# Patient Record
Sex: FEMALE | Race: WHITE | NOT HISPANIC OR LATINO
[De-identification: names, ages, dates, MRNs, and addresses within clinical notes are randomized per-mention and may not be internally consistent; named-entity substitution may affect disease eponyms.]

---

## 2018-05-30 ENCOUNTER — TRANSCRIPTION ENCOUNTER (OUTPATIENT)
Age: 73
End: 2018-05-30

## 2018-07-20 ENCOUNTER — TRANSCRIPTION ENCOUNTER (OUTPATIENT)
Age: 73
End: 2018-07-20

## 2019-03-03 ENCOUNTER — INPATIENT (INPATIENT)
Facility: HOSPITAL | Age: 74
LOS: 3 days | Discharge: HOME | DRG: 74 | End: 2019-03-07
Attending: HOSPITALIST | Admitting: STUDENT IN AN ORGANIZED HEALTH CARE EDUCATION/TRAINING PROGRAM
Payer: MEDICARE

## 2019-03-03 VITALS
SYSTOLIC BLOOD PRESSURE: 179 MMHG | OXYGEN SATURATION: 99 % | HEART RATE: 97 BPM | DIASTOLIC BLOOD PRESSURE: 90 MMHG | WEIGHT: 250 LBS | HEIGHT: 64 IN | TEMPERATURE: 98 F | RESPIRATION RATE: 20 BRPM

## 2019-03-03 DIAGNOSIS — Z29.9 ENCOUNTER FOR PROPHYLACTIC MEASURES, UNSPECIFIED: ICD-10-CM

## 2019-03-03 DIAGNOSIS — Z90.710 ACQUIRED ABSENCE OF BOTH CERVIX AND UTERUS: Chronic | ICD-10-CM

## 2019-03-03 DIAGNOSIS — E87.2 ACIDOSIS: ICD-10-CM

## 2019-03-03 DIAGNOSIS — M79.604 PAIN IN RIGHT LEG: ICD-10-CM

## 2019-03-03 DIAGNOSIS — E11.9 TYPE 2 DIABETES MELLITUS WITHOUT COMPLICATIONS: ICD-10-CM

## 2019-03-03 DIAGNOSIS — M25.559 PAIN IN UNSPECIFIED HIP: ICD-10-CM

## 2019-03-03 DIAGNOSIS — R41.3 OTHER AMNESIA: ICD-10-CM

## 2019-03-03 DIAGNOSIS — H40.9 UNSPECIFIED GLAUCOMA: ICD-10-CM

## 2019-03-03 LAB
ALBUMIN SERPL ELPH-MCNC: 4.1 G/DL — SIGNIFICANT CHANGE UP (ref 3.3–5)
ALP SERPL-CCNC: 76 U/L — SIGNIFICANT CHANGE UP (ref 40–120)
ALT FLD-CCNC: 19 U/L — SIGNIFICANT CHANGE UP (ref 10–45)
ANION GAP SERPL CALC-SCNC: 13 MMOL/L — SIGNIFICANT CHANGE UP (ref 5–17)
ANION GAP SERPL CALC-SCNC: 21 MMOL/L — HIGH (ref 5–17)
APTT BLD: 26.3 SEC — LOW (ref 27.5–36.3)
AST SERPL-CCNC: 24 U/L — SIGNIFICANT CHANGE UP (ref 10–40)
BASE EXCESS BLDV CALC-SCNC: -1.5 MMOL/L — SIGNIFICANT CHANGE UP (ref -2–2)
BASOPHILS # BLD AUTO: 0 K/UL — SIGNIFICANT CHANGE UP (ref 0–0.2)
BASOPHILS NFR BLD AUTO: 0.2 % — SIGNIFICANT CHANGE UP (ref 0–2)
BILIRUB SERPL-MCNC: 0.3 MG/DL — SIGNIFICANT CHANGE UP (ref 0.2–1.2)
BUN SERPL-MCNC: 26 MG/DL — HIGH (ref 7–23)
BUN SERPL-MCNC: 28 MG/DL — HIGH (ref 7–23)
CALCIUM SERPL-MCNC: 10.2 MG/DL — SIGNIFICANT CHANGE UP (ref 8.4–10.5)
CALCIUM SERPL-MCNC: 9.6 MG/DL — SIGNIFICANT CHANGE UP (ref 8.4–10.5)
CHLORIDE SERPL-SCNC: 106 MMOL/L — SIGNIFICANT CHANGE UP (ref 96–108)
CHLORIDE SERPL-SCNC: 106 MMOL/L — SIGNIFICANT CHANGE UP (ref 96–108)
CO2 BLDV-SCNC: 24 MMOL/L — SIGNIFICANT CHANGE UP (ref 22–30)
CO2 SERPL-SCNC: 16 MMOL/L — LOW (ref 22–31)
CO2 SERPL-SCNC: 21 MMOL/L — LOW (ref 22–31)
CREAT SERPL-MCNC: 0.82 MG/DL — SIGNIFICANT CHANGE UP (ref 0.5–1.3)
CREAT SERPL-MCNC: 0.88 MG/DL — SIGNIFICANT CHANGE UP (ref 0.5–1.3)
EOSINOPHIL # BLD AUTO: 0.2 K/UL — SIGNIFICANT CHANGE UP (ref 0–0.5)
EOSINOPHIL NFR BLD AUTO: 2 % — SIGNIFICANT CHANGE UP (ref 0–6)
ERYTHROCYTE [SEDIMENTATION RATE] IN BLOOD: 82 MM/HR — HIGH (ref 0–20)
GAS PNL BLDV: SIGNIFICANT CHANGE UP
GLUCOSE SERPL-MCNC: 137 MG/DL — HIGH (ref 70–99)
GLUCOSE SERPL-MCNC: 151 MG/DL — HIGH (ref 70–99)
HCO3 BLDV-SCNC: 23 MMOL/L — SIGNIFICANT CHANGE UP (ref 21–29)
HCT VFR BLD CALC: 39.9 % — SIGNIFICANT CHANGE UP (ref 34.5–45)
HGB BLD-MCNC: 13.7 G/DL — SIGNIFICANT CHANGE UP (ref 11.5–15.5)
INR BLD: 1.01 RATIO — SIGNIFICANT CHANGE UP (ref 0.88–1.16)
LACTATE SERPL-SCNC: 1.2 MMOL/L — SIGNIFICANT CHANGE UP (ref 0.7–2)
LYMPHOCYTES # BLD AUTO: 2.7 K/UL — SIGNIFICANT CHANGE UP (ref 1–3.3)
LYMPHOCYTES # BLD AUTO: 26.2 % — SIGNIFICANT CHANGE UP (ref 13–44)
MCHC RBC-ENTMCNC: 30.7 PG — SIGNIFICANT CHANGE UP (ref 27–34)
MCHC RBC-ENTMCNC: 34.3 GM/DL — SIGNIFICANT CHANGE UP (ref 32–36)
MCV RBC AUTO: 89.4 FL — SIGNIFICANT CHANGE UP (ref 80–100)
MONOCYTES # BLD AUTO: 0.8 K/UL — SIGNIFICANT CHANGE UP (ref 0–0.9)
MONOCYTES NFR BLD AUTO: 7.7 % — SIGNIFICANT CHANGE UP (ref 2–14)
NEUTROPHILS # BLD AUTO: 6.7 K/UL — SIGNIFICANT CHANGE UP (ref 1.8–7.4)
NEUTROPHILS NFR BLD AUTO: 63.9 % — SIGNIFICANT CHANGE UP (ref 43–77)
PCO2 BLDV: 41 MMHG — SIGNIFICANT CHANGE UP (ref 35–50)
PH BLDV: 7.37 — SIGNIFICANT CHANGE UP (ref 7.35–7.45)
PLATELET # BLD AUTO: 221 K/UL — SIGNIFICANT CHANGE UP (ref 150–400)
PO2 BLDV: 41 MMHG — SIGNIFICANT CHANGE UP (ref 25–45)
POTASSIUM SERPL-MCNC: 3.7 MMOL/L — SIGNIFICANT CHANGE UP (ref 3.5–5.3)
POTASSIUM SERPL-MCNC: 4.8 MMOL/L — SIGNIFICANT CHANGE UP (ref 3.5–5.3)
POTASSIUM SERPL-SCNC: 3.7 MMOL/L — SIGNIFICANT CHANGE UP (ref 3.5–5.3)
POTASSIUM SERPL-SCNC: 4.8 MMOL/L — SIGNIFICANT CHANGE UP (ref 3.5–5.3)
PROT SERPL-MCNC: 7.9 G/DL — SIGNIFICANT CHANGE UP (ref 6–8.3)
PROTHROM AB SERPL-ACNC: 11.6 SEC — SIGNIFICANT CHANGE UP (ref 10–12.9)
RBC # BLD: 4.46 M/UL — SIGNIFICANT CHANGE UP (ref 3.8–5.2)
RBC # FLD: 13.3 % — SIGNIFICANT CHANGE UP (ref 10.3–14.5)
SAO2 % BLDV: 70 % — SIGNIFICANT CHANGE UP (ref 67–88)
SODIUM SERPL-SCNC: 140 MMOL/L — SIGNIFICANT CHANGE UP (ref 135–145)
SODIUM SERPL-SCNC: 143 MMOL/L — SIGNIFICANT CHANGE UP (ref 135–145)
TSH SERPL-MCNC: 3.21 UIU/ML — SIGNIFICANT CHANGE UP (ref 0.27–4.2)
VIT B12 SERPL-MCNC: 191 PG/ML — LOW (ref 232–1245)
WBC # BLD: 10.4 K/UL — SIGNIFICANT CHANGE UP (ref 3.8–10.5)
WBC # FLD AUTO: 10.4 K/UL — SIGNIFICANT CHANGE UP (ref 3.8–10.5)

## 2019-03-03 PROCEDURE — 71045 X-RAY EXAM CHEST 1 VIEW: CPT | Mod: 26

## 2019-03-03 PROCEDURE — 99223 1ST HOSP IP/OBS HIGH 75: CPT

## 2019-03-03 PROCEDURE — 93010 ELECTROCARDIOGRAM REPORT: CPT

## 2019-03-03 PROCEDURE — 72170 X-RAY EXAM OF PELVIS: CPT | Mod: 26

## 2019-03-03 PROCEDURE — 99285 EMERGENCY DEPT VISIT HI MDM: CPT | Mod: GC,25

## 2019-03-03 RX ORDER — INSULIN LISPRO 100/ML
VIAL (ML) SUBCUTANEOUS
Qty: 0 | Refills: 0 | Status: DISCONTINUED | OUTPATIENT
Start: 2019-03-03 | End: 2019-03-07

## 2019-03-03 RX ORDER — DEXTROSE 50 % IN WATER 50 %
25 SYRINGE (ML) INTRAVENOUS ONCE
Qty: 0 | Refills: 0 | Status: DISCONTINUED | OUTPATIENT
Start: 2019-03-03 | End: 2019-03-07

## 2019-03-03 RX ORDER — SODIUM CHLORIDE 9 MG/ML
1000 INJECTION, SOLUTION INTRAVENOUS
Qty: 0 | Refills: 0 | Status: DISCONTINUED | OUTPATIENT
Start: 2019-03-03 | End: 2019-03-07

## 2019-03-03 RX ORDER — GABAPENTIN 400 MG/1
100 CAPSULE ORAL
Qty: 0 | Refills: 0 | Status: DISCONTINUED | OUTPATIENT
Start: 2019-03-03 | End: 2019-03-05

## 2019-03-03 RX ORDER — HEPARIN SODIUM 5000 [USP'U]/ML
5000 INJECTION INTRAVENOUS; SUBCUTANEOUS EVERY 8 HOURS
Qty: 0 | Refills: 0 | Status: DISCONTINUED | OUTPATIENT
Start: 2019-03-03 | End: 2019-03-07

## 2019-03-03 RX ORDER — INSULIN LISPRO 100/ML
VIAL (ML) SUBCUTANEOUS
Qty: 0 | Refills: 0 | Status: DISCONTINUED | OUTPATIENT
Start: 2019-03-03 | End: 2019-03-03

## 2019-03-03 RX ORDER — LIDOCAINE 4 G/100G
1 CREAM TOPICAL ONCE
Qty: 0 | Refills: 0 | Status: COMPLETED | OUTPATIENT
Start: 2019-03-03 | End: 2019-03-03

## 2019-03-03 RX ORDER — INSULIN LISPRO 100/ML
VIAL (ML) SUBCUTANEOUS AT BEDTIME
Qty: 0 | Refills: 0 | Status: DISCONTINUED | OUTPATIENT
Start: 2019-03-03 | End: 2019-03-07

## 2019-03-03 RX ORDER — ACETAMINOPHEN 500 MG
975 TABLET ORAL ONCE
Qty: 0 | Refills: 0 | Status: COMPLETED | OUTPATIENT
Start: 2019-03-03 | End: 2019-03-03

## 2019-03-03 RX ORDER — ASPIRIN/CALCIUM CARB/MAGNESIUM 324 MG
81 TABLET ORAL DAILY
Qty: 0 | Refills: 0 | Status: DISCONTINUED | OUTPATIENT
Start: 2019-03-03 | End: 2019-03-07

## 2019-03-03 RX ORDER — LATANOPROST 0.05 MG/ML
1 SOLUTION/ DROPS OPHTHALMIC; TOPICAL AT BEDTIME
Qty: 0 | Refills: 0 | Status: DISCONTINUED | OUTPATIENT
Start: 2019-03-03 | End: 2019-03-07

## 2019-03-03 RX ORDER — GLUCAGON INJECTION, SOLUTION 0.5 MG/.1ML
1 INJECTION, SOLUTION SUBCUTANEOUS ONCE
Qty: 0 | Refills: 0 | Status: DISCONTINUED | OUTPATIENT
Start: 2019-03-03 | End: 2019-03-07

## 2019-03-03 RX ORDER — DEXTROSE 50 % IN WATER 50 %
15 SYRINGE (ML) INTRAVENOUS ONCE
Qty: 0 | Refills: 0 | Status: DISCONTINUED | OUTPATIENT
Start: 2019-03-03 | End: 2019-03-07

## 2019-03-03 RX ORDER — ATORVASTATIN CALCIUM 80 MG/1
20 TABLET, FILM COATED ORAL AT BEDTIME
Qty: 0 | Refills: 0 | Status: DISCONTINUED | OUTPATIENT
Start: 2019-03-03 | End: 2019-03-07

## 2019-03-03 RX ORDER — BRINZOLAMIDE/BRIMONIDINE TARTRATE 10; 2 MG/ML; MG/ML
1 SUSPENSION/ DROPS OPHTHALMIC
Qty: 0 | Refills: 0 | COMMUNITY

## 2019-03-03 RX ORDER — CYCLOBENZAPRINE HYDROCHLORIDE 10 MG/1
10 TABLET, FILM COATED ORAL ONCE
Qty: 0 | Refills: 0 | Status: COMPLETED | OUTPATIENT
Start: 2019-03-03 | End: 2019-03-03

## 2019-03-03 RX ORDER — ACETAMINOPHEN 500 MG
650 TABLET ORAL EVERY 6 HOURS
Qty: 0 | Refills: 0 | Status: DISCONTINUED | OUTPATIENT
Start: 2019-03-03 | End: 2019-03-07

## 2019-03-03 RX ORDER — DEXTROSE 50 % IN WATER 50 %
12.5 SYRINGE (ML) INTRAVENOUS ONCE
Qty: 0 | Refills: 0 | Status: DISCONTINUED | OUTPATIENT
Start: 2019-03-03 | End: 2019-03-07

## 2019-03-03 RX ADMIN — CYCLOBENZAPRINE HYDROCHLORIDE 10 MILLIGRAM(S): 10 TABLET, FILM COATED ORAL at 04:23

## 2019-03-03 RX ADMIN — Medication 650 MILLIGRAM(S): at 11:00

## 2019-03-03 RX ADMIN — Medication 81 MILLIGRAM(S): at 13:44

## 2019-03-03 RX ADMIN — Medication 650 MILLIGRAM(S): at 22:30

## 2019-03-03 RX ADMIN — Medication 975 MILLIGRAM(S): at 04:23

## 2019-03-03 RX ADMIN — GABAPENTIN 100 MILLIGRAM(S): 400 CAPSULE ORAL at 18:08

## 2019-03-03 RX ADMIN — LIDOCAINE 1 PATCH: 4 CREAM TOPICAL at 04:23

## 2019-03-03 RX ADMIN — Medication 650 MILLIGRAM(S): at 10:29

## 2019-03-03 RX ADMIN — LIDOCAINE 1 PATCH: 4 CREAM TOPICAL at 07:40

## 2019-03-03 RX ADMIN — Medication 650 MILLIGRAM(S): at 22:53

## 2019-03-03 RX ADMIN — LIDOCAINE 1 PATCH: 4 CREAM TOPICAL at 18:28

## 2019-03-03 RX ADMIN — LATANOPROST 1 DROP(S): 0.05 SOLUTION/ DROPS OPHTHALMIC; TOPICAL at 22:52

## 2019-03-03 NOTE — H&P ADULT - HISTORY OF PRESENT ILLNESS
74F pmhx of DM2, glaucoma, uterine CA s/p hysterecotmy, and urinary incontinence who presents brought in by daughter for decreased functionality at home.  She has not been able to stand and walk as normal (usually uses a cane) and had a fall about week ago where she was on the floor for several hours before her daughter came home.  She is not able to provide much history and appears nervous.  Per chart review she has also been having some cognitive decline with memory and ADLs over the past several weeks to months as well.  She has been to outpatient physical therapy which helps when she gets deconditioned She is complaining of chronic low back pain and worsening left hip, leg and knee pain.  She has pain in both of her hips and legs, worse on the left and also complains of knee pain but has full ROM of all joints. She does not recall if she injured her hip in her fall. She does not know her medications and limited medical history.     In ED:  T 98.3 HR 97 /90 RR 20 O2 99%  She was given Tylenol and Flexeril

## 2019-03-03 NOTE — ED PROVIDER NOTE - OBJECTIVE STATEMENT
74F with hx of DM, HTN, glaucoma, asthma, uterina ca (s/p hysterectomy) presenting with 74F with hx of DM, HTN, glaucoma, asthma, uterina ca (s/p hysterectomy) presenting with 2 months of worsening lower back pain, left hip pain. Denies any trauma or falls. Pain has progressively worsened to the point where patient cannot get up from sitting on her own. Has been using a Rollerator to ambulate. Pain is sharp in nature, nonradiating. No associated leg weakness. Patient has been incontinent of urine for the past few years. Denies saddle anesthesia, fever, chills, unintended weight loss, night sweats. 74F with hx of DM, HTN, glaucoma, asthma, uterina ca (s/p hysterectomy) presenting with 2 months of worsening lower back pain, left hip pain. Denies any trauma or falls. Pain has progressively worsened to the point where patient cannot get up from sitting on her own. Has been using a Rollerator to ambulate. Pain is sharp in nature, nonradiating. No associated leg weakness. Patient has been incontinent of urine for the past few years. Denies saddle anesthesia, fever, chills, unintended weight loss, night sweats.    PMD: Eliu Rubio 74F with hx of DM, HTN, glaucoma, asthma, uterina ca (s/p hysterectomy) presenting with 2 months of worsening lower back pain, left hip pain. Denies any trauma or falls. Pain has progressively worsened to the point where patient cannot get up from sitting on her own. Has been using a Rollerator to ambulate. Pain is sharp in nature, nonradiating. No associated leg weakness. Patient has been incontinent of urine for the past few years. Denies saddle anesthesia, fever, chills, unintended weight loss, night sweats.    PMD: Eliu Chin

## 2019-03-03 NOTE — H&P ADULT - MENTAL STATUS
AAOx3  Unable to say if sensation is decreased or increased.  She is able to move all 4 extremities but b/l LE are 4-/5

## 2019-03-03 NOTE — H&P ADULT - NSHPLABSRESULTS_GEN_ALL_CORE
03-03    143  |  106  |  28<H>  ----------------------------<  151<H>  4.8   |  16<L>  |  0.88    Ca    10.2      03 Mar 2019 05:26    TPro  7.9  /  Alb  4.1  /  TBili  0.3  /  DBili  x   /  AST  24  /  ALT  19  /  AlkPhos  76  03-03                            13.7   10.4  )-----------( 221      ( 03 Mar 2019 05:26 )             39.9             LIVER FUNCTIONS - ( 03 Mar 2019 05:26 )  Alb: 4.1 g/dL / Pro: 7.9 g/dL / ALK PHOS: 76 U/L / ALT: 19 U/L / AST: 24 U/L / GGT: x             PT/INR - ( 03 Mar 2019 05:26 )   PT: 11.6 sec;   INR: 1.01 ratio         PTT - ( 03 Mar 2019 05:26 )  PTT:26.3 sec  EXAM: Frontal view of the pelvis with no prior comparisons.    Xray hip/pelvis  IMPRESSION:    No acute fracture or dislocation is demonstrated. Preserved hip joint   spaces.

## 2019-03-03 NOTE — H&P ADULT - PROBLEM SELECTOR PLAN 3
Check lactate, BG is normal but will get UA to look for ketones as could be starvation ketosis, renal function is normal, no signs of intoxicants.   Repeat BMP now

## 2019-03-03 NOTE — ED ADULT NURSE NOTE - NSIMPLEMENTINTERV_GEN_ALL_ED
Implemented All Fall Risk Interventions:  [place] to call system. Call bell, personal items and telephone within reach. Instruct patient to call for assistance. Room bathroom lighting operational. Non-slip footwear when patient is off stretcher. Physically safe environment: no spills, clutter or unnecessary equipment. Stretcher in lowest position, wheels locked, appropriate side rails in place. Provide visual cue, wrist band, yellow gown, etc. Monitor gait and stability. Monitor for mental status changes and reorient to person, place, and time. Review medications for side effects contributing to fall risk. Reinforce activity limits and safety measures with patient and family.

## 2019-03-03 NOTE — ED PROVIDER NOTE - CLINICAL SUMMARY MEDICAL DECISION MAKING FREE TEXT BOX
74F with hx of DM, HTN, glaucoma, asthma, uterina ca (s/p hysterectomy) presenting with 2 months of worsening lower back pain, left hip pain. Unlikely fractured, given absence of fall or trauma. Plan - xray pelvis, sx control, admit if cannot ambulate.

## 2019-03-03 NOTE — ED PROVIDER NOTE - NS ED ROS FT
GENERAL: no fever, chills  HEENT: no throat pain, cough, congestion, dysphagia  CARDIAC: no chest pain, palpitations  PULM: no shortness of breath, cough, wheezing   GI: no abdominal pain, nausea, vomiting, diarrhea, constipation  : no dysuria, frequency  NEURO: no headache, lightheadedness  MSK: + left hip pain  SKIN: no rashes  HEME: no active bleeding

## 2019-03-03 NOTE — H&P ADULT - PMH
Glaucoma    Type 2 diabetes mellitus without complication, without long-term current use of insulin    Urinary incontinence    Uterine cancer

## 2019-03-03 NOTE — ED PROVIDER NOTE - ATTENDING CONTRIBUTION TO CARE
Attending MD Zeng: I personally have seen and examined this patient.  Resident note reviewed and agree on plan of care and except where noted.  See below for details.     Seen in [facility], accompanied by daughter (Becki Devi), lives with mom    74F   DM, HTN, partial loss of vision in right eye (Diabetic retinopathy), L hand neuropathy, asthma, uterine cancer s/p hysterectomy, incontinence  April 2018 --> August 2018 -->   Reports cannot get up out of chair, then PT improves,   Now needs help laying down  Reports has not come downstairs in 5 days    Increased forgetfulness, unable to take own medication (pill box)    Dr. 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 Curtis Ochoa (uro)    Pending vascular sx appointment on 3/11/19 .    Meds Crestor, Janumet XR, ASA, Rosuvastatin, Travatan, Simbriza, Traviaz     Here with L hip/backpain,LLE Attending MD Zeng: I personally have seen and examined this patient.  Resident note reviewed and agree on plan of care and except where noted.  See below for details.     Seen in [facility], accompanied by daughter (Becki Devi), lives with mom  Dr. 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 Curtis Ochoa (uro)  Pending vascular sx appointment on 3/11/19     74F with PMH/PSH including DM, HTN, partial loss of vision in right eye (?diabetic retinopathy), glaucoma, L hand neuropathy, asthma, uterine cancer s/p hysterectomy, urinary incontinence presents to the ED with L lower back, L hip and LLE pain.  Patient able to provide very little history despite AAOx3. Daughter reports this has happened multiple times over the last year.  Reports starts the same where patient cannot get up out of chair, needs assistance, goes to PT, improves.  Reports happened in April 2018 and then again in August 2018.  Reports this is worse than previous episodes as now needs help laying down.  Reports has not come downstairs in their home in 5 days.  Reports that she has also noticed other issues.  Reports increased forgetfulness, unable to take own medication, despite being in a pill box.  Patient reports has pain but does not describe beyond sharp and indicates localized to L lower back, L hip and proximal L lateral thigh. Denies chest pain, shortness of breath, palpitations. Denies abdominal pain, nausea, vomiting, diarrhea, blood in stools. Denies dysuria, hematuria, change in urinary habits including frequency, urgency. Denies fevers, chills, dizziness, weakness. Denies fall, trauma.  Meds Crestor, Janumet XR, ASA, Rosuvastatin, Travatan, Simbrinza, Toviaz. On exam, NAD, head NCAT, PERRL, FROM at neck, no tenderness to midline palpation, no stepoffs along length of spine, +tenderness to palpation diffusely at L lower back laterally, extending into L buttock and L hip, no overlying skin changes, +L SLR, no saddle anesthesia, lungs CTAB with good inspiratory effort, +S1S2, no m/r/g, abdomen soft with +BS, NT, ND, no CVAT, moving all extremities with 5/5 strength bilateral upper and lower extremities, good and equal  strength bilaterally; A/P: 74F with L back, hip, LLE pain, DDx includes acute on chronic back pain, lower suspicion for hip fracture given lack of mechanism, will obtain XRs CXR and Pelvis, will obtain labs, EKG, pain control, reassess.  Even if patient well controlled on pain medications in ED, it sounds like she may need SW for assistance with ADLs.  Will need family discussion.

## 2019-03-03 NOTE — H&P ADULT - ASSESSMENT
74F pmhx of DM2, glaucoma, uterine CA s/p hysterecotmy, and urinary incontinence admitted for inability to ambulate 2/2 LE pain.

## 2019-03-03 NOTE — H&P ADULT - PROBLEM SELECTOR PLAN 1
Full ROM and able to tolerate load bearing suggests unlikely hip fracture  Neuropathy vs. ostearthritis vs. PMR?  -CHeck A1C, ESR and CK  -Xray showed no fracture and exam not consistent with fracture  -start Gabapentin 100 BID and can increase if tolerates  -PT eval

## 2019-03-03 NOTE — ED ADULT NURSE NOTE - OBJECTIVE STATEMENT
73 y/o female a+ox3, pmhx chronic back pain, chronic incontinence, DM, coming from home for back pain x months. Pt reports worsening lower back pain earlier today; pt has had chronic lower back pain for "months"; pain this morning is radiating to left hip. Pt states is it harder for her to ambulate without pain, daughter at bedside states pt has had more difficulty standing from a seated position over past week. Pt denies numbness or tingling to extremities, loss of sensation, recent falls or trauma, blood thinner use, chest pain or discomfort, headache, lightheadedness, dizziness, difficulty breathing, worsening leg swelling.  Pt changed into hospital gown, left in position of comfort. Will reassess

## 2019-03-03 NOTE — ED PROVIDER NOTE - PHYSICAL EXAMINATION
GENERAL: NAD, non-toxic appearing, obese, requiring assistance to get onto stretcher and change positions  HEAD: NCAT  HEENT: Normal conjunctiva, oral mucosa moist  CARDIAC: RRR, no murmurs  PULM: CTAB, no crackles, rales, rhonchi, or wheezing  GI: Abdomen nondistended, soft, nontender, no guarding or rebound tenderness  NEURO: AAO x 3, PERRLA, EOMI, no focal motor or sensory deficits  MSK: Moving 4 extremities, no visible deformities; no midline spinal tenderness; 5/5 strength at ankles bilaterally  SKIN: No visible rashes, dry, well-perfused  PSYCH: Appropriate mood and affect

## 2019-03-03 NOTE — H&P ADULT - NSHPPHYSICALEXAM_GEN_ALL_CORE
Vital Signs Last 24 Hrs  T(C): 37 (03 Mar 2019 08:56), Max: 37 (03 Mar 2019 06:34)  T(F): 98.6 (03 Mar 2019 08:56), Max: 98.6 (03 Mar 2019 06:34)  HR: 95 (03 Mar 2019 08:56) (88 - 97)  BP: 160/77 (03 Mar 2019 08:56) (156/65 - 179/90)  BP(mean): --  RR: 16 (03 Mar 2019 08:56) (16 - 20)  SpO2: 97% (03 Mar 2019 08:56) (97% - 100%)

## 2019-03-04 LAB
ANION GAP SERPL CALC-SCNC: 14 MMOL/L — SIGNIFICANT CHANGE UP (ref 5–17)
APPEARANCE UR: ABNORMAL
BILIRUB UR-MCNC: NEGATIVE — SIGNIFICANT CHANGE UP
BUN SERPL-MCNC: 33 MG/DL — HIGH (ref 7–23)
CALCIUM SERPL-MCNC: 9.4 MG/DL — SIGNIFICANT CHANGE UP (ref 8.4–10.5)
CHLORIDE SERPL-SCNC: 108 MMOL/L — SIGNIFICANT CHANGE UP (ref 96–108)
CO2 SERPL-SCNC: 19 MMOL/L — LOW (ref 22–31)
COLOR SPEC: YELLOW — SIGNIFICANT CHANGE UP
CREAT SERPL-MCNC: 0.88 MG/DL — SIGNIFICANT CHANGE UP (ref 0.5–1.3)
DIFF PNL FLD: NEGATIVE — SIGNIFICANT CHANGE UP
GLUCOSE SERPL-MCNC: 151 MG/DL — HIGH (ref 70–99)
GLUCOSE UR QL: NEGATIVE — SIGNIFICANT CHANGE UP
HBA1C BLD-MCNC: 7.2 % — HIGH (ref 4–5.6)
HCT VFR BLD CALC: 35.7 % — SIGNIFICANT CHANGE UP (ref 34.5–45)
HGB BLD-MCNC: 11.5 G/DL — SIGNIFICANT CHANGE UP (ref 11.5–15.5)
KETONES UR-MCNC: NEGATIVE — SIGNIFICANT CHANGE UP
LEUKOCYTE ESTERASE UR-ACNC: ABNORMAL
MCHC RBC-ENTMCNC: 29.5 PG — SIGNIFICANT CHANGE UP (ref 27–34)
MCHC RBC-ENTMCNC: 32.2 GM/DL — SIGNIFICANT CHANGE UP (ref 32–36)
MCV RBC AUTO: 91.5 FL — SIGNIFICANT CHANGE UP (ref 80–100)
NITRITE UR-MCNC: NEGATIVE — SIGNIFICANT CHANGE UP
PH UR: 5.5 — SIGNIFICANT CHANGE UP (ref 5–8)
PLATELET # BLD AUTO: 210 K/UL — SIGNIFICANT CHANGE UP (ref 150–400)
POTASSIUM SERPL-MCNC: 3.6 MMOL/L — SIGNIFICANT CHANGE UP (ref 3.5–5.3)
POTASSIUM SERPL-SCNC: 3.6 MMOL/L — SIGNIFICANT CHANGE UP (ref 3.5–5.3)
PROT UR-MCNC: ABNORMAL
RBC # BLD: 3.9 M/UL — SIGNIFICANT CHANGE UP (ref 3.8–5.2)
RBC # FLD: 14.6 % — HIGH (ref 10.3–14.5)
SODIUM SERPL-SCNC: 141 MMOL/L — SIGNIFICANT CHANGE UP (ref 135–145)
SP GR SPEC: 1.04 — HIGH (ref 1.01–1.02)
T PALLIDUM AB TITR SER: NEGATIVE — SIGNIFICANT CHANGE UP
UROBILINOGEN FLD QL: SIGNIFICANT CHANGE UP
WBC # BLD: 7.64 K/UL — SIGNIFICANT CHANGE UP (ref 3.8–10.5)
WBC # FLD AUTO: 7.64 K/UL — SIGNIFICANT CHANGE UP (ref 3.8–10.5)

## 2019-03-04 PROCEDURE — 99233 SBSQ HOSP IP/OBS HIGH 50: CPT

## 2019-03-04 RX ORDER — PREGABALIN 225 MG/1
1000 CAPSULE ORAL DAILY
Qty: 0 | Refills: 0 | Status: DISCONTINUED | OUTPATIENT
Start: 2019-03-04 | End: 2019-03-07

## 2019-03-04 RX ADMIN — HEPARIN SODIUM 5000 UNIT(S): 5000 INJECTION INTRAVENOUS; SUBCUTANEOUS at 22:58

## 2019-03-04 RX ADMIN — Medication 30 MILLIGRAM(S): at 18:30

## 2019-03-04 RX ADMIN — Medication 650 MILLIGRAM(S): at 06:45

## 2019-03-04 RX ADMIN — Medication 650 MILLIGRAM(S): at 06:17

## 2019-03-04 RX ADMIN — PREGABALIN 1000 MICROGRAM(S): 225 CAPSULE ORAL at 11:49

## 2019-03-04 RX ADMIN — Medication 1: at 22:52

## 2019-03-04 RX ADMIN — Medication 81 MILLIGRAM(S): at 11:48

## 2019-03-04 RX ADMIN — GABAPENTIN 100 MILLIGRAM(S): 400 CAPSULE ORAL at 17:35

## 2019-03-04 RX ADMIN — LATANOPROST 1 DROP(S): 0.05 SOLUTION/ DROPS OPHTHALMIC; TOPICAL at 22:52

## 2019-03-04 RX ADMIN — Medication 650 MILLIGRAM(S): at 11:49

## 2019-03-04 RX ADMIN — Medication 650 MILLIGRAM(S): at 12:20

## 2019-03-04 RX ADMIN — GABAPENTIN 100 MILLIGRAM(S): 400 CAPSULE ORAL at 06:17

## 2019-03-04 RX ADMIN — Medication 2: at 09:01

## 2019-03-04 RX ADMIN — ATORVASTATIN CALCIUM 20 MILLIGRAM(S): 80 TABLET, FILM COATED ORAL at 22:52

## 2019-03-04 NOTE — PROGRESS NOTE ADULT - ATTENDING COMMENTS
Marciano Mathias MD  Division of [facility]  Cell: [phone]  Pager: [phone]  Office: [phone]

## 2019-03-04 NOTE — CONSULT NOTE ADULT - SUBJECTIVE AND OBJECTIVE BOX
[facility]  [id number]    HISTORY OF PRESENT ILLNESS:    74F pmhx of DM2, glaucoma, uterine CA s/p hysterecotmy, and urinary incontinence who presents brought in by daughter for decreased functionality at home.  She has not been able to stand and walk as normal (usually uses a cane) and had a fall about week ago where she was on the floor for several hours before her daughter came home.  She is not able to provide much history and appears nervous.  Per chart review she has also been having some cognitive decline with memory and ADLs over the past several weeks to months as well.  She has been to outpatient physical therapy which helps when she gets deconditioned She is complaining of chronic low back pain and worsening left hip, leg and knee pain.  She has pain in both of her hips and legs, worse on the left and also complains of knee pain but has full ROM of all joints. She does not recall if she injured her hip in her fall. She does not know her medications and limited medical history.       PAST MEDICAL & SURGICAL HISTORY:  Urinary incontinence  Uterine cancer  Glaucoma  Type 2 diabetes mellitus without complication, without long-term current use of insulin  H/O total hysterectomy      Review of Systems:  Gen:  No fevers/chills, weight loss  HEENT: No blurry vision, no difficulty swallowing  CVS: No chest pain/palpitations  Resp: No SOB/wheezing  GI: No N/V/C/D/abdominal pain  MSK:  Skin: No new rashes  Neuro: No headaches    MEDICATIONS  (STANDING):  aspirin enteric coated 81 milliGRAM(s) Oral daily  atorvastatin 20 milliGRAM(s) Oral at bedtime  cyanocobalamin 1000 MICROGram(s) Oral daily  dextrose 5%. 1000 milliLiter(s) (50 mL/Hr) IV Continuous <Continuous>  dextrose 5%. 1000 milliLiter(s) (50 mL/Hr) IV Continuous <Continuous>  dextrose 50% Injectable 12.5 Gram(s) IV Push once  dextrose 50% Injectable 25 Gram(s) IV Push once  dextrose 50% Injectable 25 Gram(s) IV Push once  dextrose 50% Injectable 12.5 Gram(s) IV Push once  dextrose 50% Injectable 25 Gram(s) IV Push once  dextrose 50% Injectable 25 Gram(s) IV Push once  gabapentin 100 milliGRAM(s) Oral two times a day  heparin  Injectable 5000 Unit(s) SubCutaneous every 8 hours  insulin lispro (HumaLOG) corrective regimen sliding scale   SubCutaneous at bedtime  insulin lispro (HumaLOG) corrective regimen sliding scale   SubCutaneous three times a day with meals  latanoprost 0.005% Ophthalmic Solution 1 Drop(s) Both EYES at bedtime    MEDICATIONS  (PRN):  acetaminophen   Tablet .. 650 milliGRAM(s) Oral every 6 hours PRN Moderate Pain (4 - 6)  dextrose 40% Gel 15 Gram(s) Oral once PRN Blood Glucose LESS THAN 70 milliGRAM(s)/deciliter  dextrose 40% Gel 15 Gram(s) Oral once PRN Blood Glucose LESS THAN 70 milliGRAM(s)/deciliter  glucagon  Injectable 1 milliGRAM(s) IntraMuscular once PRN Glucose LESS THAN 70 milligrams/deciliter  glucagon  Injectable 1 milliGRAM(s) IntraMuscular once PRN Glucose LESS THAN 70 milligrams/deciliter  LORazepam     Tablet 1 milliGRAM(s) Oral once PRN Give 30 minutes prior to MRI      Allergies    penicillin (Unknown)    Intolerances        PERTINENT MEDICATION HISTORY:    SOCIAL HISTORY:  OCCUPATION:  TRAVEL HISTORY:    FAMILY HISTORY:  No pertinent family history in first degree relatives      Vital Signs Last 24 Hrs  T(C): 37 (04 Mar 2019 13:30), Max: 37.4 (03 Mar 2019 20:34)  T(F): 98.6 (04 Mar 2019 13:30), Max: 99.3 (03 Mar 2019 20:34)  HR: 79 (04 Mar 2019 13:30) (64 - 101)  BP: 158/74 (04 Mar 2019 13:30) (123/59 - 171/82)  BP(mean): --  RR: 18 (04 Mar 2019 13:30) (16 - 18)  SpO2: 95% (04 Mar 2019 13:30) (94% - 98%)    Daily     Daily     Physical Exam:  General: No apparent distress  HEENT: EOMI, MMM  CVS: +S1/S2, RRR  Resp: CTA b/l  GI: Soft, NT/ND  MSK:    Neuro: AAOx3  muscle strength RUE LUE ; RLE LLE   Skin: no  rashes    LABS:                        11.5   7.64  )-----------( 210      ( 04 Mar 2019 08:34 )             35.7     03-04    141  |  108  |  33<H>  ----------------------------<  151<H>  3.6   |  19<L>  |  0.88    Ca    9.4      04 Mar 2019 07:18    TPro  7.9  /  Alb  4.1  /  TBili  0.3  /  DBili  x   /  AST  24  /  ALT  19  /  AlkPhos  76  03-03    PT/INR - ( 03 Mar 2019 05:26 )   PT: 11.6 sec;   INR: 1.01 ratio         PTT - ( 03 Mar 2019 05:26 )  PTT:26.3 sec  Urinalysis Basic - ( 03 Mar 2019 22:18 )    Color: Yellow / Appearance: Slightly Turbid / S.036 / pH: x  Gluc: x / Ketone: Negative  / Bili: Negative / Urobili: <2 mg/dL   Blood: x / Protein: 30 mg/dL / Nitrite: Negative   Leuk Esterase: Large / RBC: 9 /HPF / WBC 92 /HPF   Sq Epi: x / Non Sq Epi: 5 /HPF / Bacteria: Negative    Sedimentation Rate, Erythrocyte (19 @ 16:59)    Sedimentation Rate, Erythrocyte: 82 mm/hr    Hemoglobin A1C, Whole Blood (19 @ 08:34)    Hemoglobin A1C, Whole Blood: 7.2: Method: Immunoassay    Vitamin B12, Serum (19 @ 17:02)    Vitamin B12, Serum: 191 pg/mL        RADIOLOGY & ADDITIONAL STUDIES:    < from: Xray Pelvis AP only (19 @ 05:21) >  EXAM:  PELVIS AP ONLY                            PROCEDURE DATE:  2019            INTERPRETATION:  CLINICAL INFORMATION: Left hip pain.    EXAM: Frontal view of the pelvis with no prior comparisons.    IMPRESSION:    No acute fracture or dislocation is demonstrated. Preserved hip joint   spaces.                  RUTHANN CARTER M.D., RADIOLOGY RESIDENT  This document has been electronically signed.  PHYLLIS SUTTON M.D., ATTENDING RADIOLOGIST  This document has been electronically signed. Mar  [id number]  9:01AM                < end of copied text > [facility]  [id number]    HISTORY OF PRESENT ILLNESS:    74F hx of DM2, urinary incontinence presents for inability to ambulate. Pt bad hx, inconsistent w hx. Per pt, pt has been week for past several weeks or months, and has not been able to ambulate by herself. Pt had a fall last week on top of the stairs. Pt felt off balance, and fell, and stayed hours on the floor. Pt then developed L hip pain. Pt could not describe the pain, but it is constant. Pt also experience tingling pain in L hand. Pt says for months has had upper arm/shoulder pain/stiffness. Pt says for past few weeks or months, has had a shadow in the visual field of L eye. Pt saw two eye doctors, and got eye drops. Pt not sure of her condition. Pt denies any h/a, jaw claudication, fevers, chills, rashes.       PAST MEDICAL & SURGICAL HISTORY:  Urinary incontinence  Uterine cancer  Glaucoma  Type 2 diabetes mellitus without complication, without long-term current use of insulin  H/O total hysterectomy      Review of Systems:  Gen:  No fevers/chills, weight loss  HEENT: per HPI   CVS: No chest pain/palpitations  Resp: No SOB/wheezing  GI: No N/V/C/D/abdominal pain  MSK: per HPI   Skin: No new rashes  Neuro: No headaches    MEDICATIONS  (STANDING):  aspirin enteric coated 81 milliGRAM(s) Oral daily  atorvastatin 20 milliGRAM(s) Oral at bedtime  cyanocobalamin 1000 MICROGram(s) Oral daily  dextrose 5%. 1000 milliLiter(s) (50 mL/Hr) IV Continuous <Continuous>  dextrose 5%. 1000 milliLiter(s) (50 mL/Hr) IV Continuous <Continuous>  dextrose 50% Injectable 12.5 Gram(s) IV Push once  dextrose 50% Injectable 25 Gram(s) IV Push once  dextrose 50% Injectable 25 Gram(s) IV Push once  dextrose 50% Injectable 12.5 Gram(s) IV Push once  dextrose 50% Injectable 25 Gram(s) IV Push once  dextrose 50% Injectable 25 Gram(s) IV Push once  gabapentin 100 milliGRAM(s) Oral two times a day  heparin  Injectable 5000 Unit(s) SubCutaneous every 8 hours  insulin lispro (HumaLOG) corrective regimen sliding scale   SubCutaneous at bedtime  insulin lispro (HumaLOG) corrective regimen sliding scale   SubCutaneous three times a day with meals  latanoprost 0.005% Ophthalmic Solution 1 Drop(s) Both EYES at bedtime    MEDICATIONS  (PRN):  acetaminophen   Tablet .. 650 milliGRAM(s) Oral every 6 hours PRN Moderate Pain (4 - 6)  dextrose 40% Gel 15 Gram(s) Oral once PRN Blood Glucose LESS THAN 70 milliGRAM(s)/deciliter  dextrose 40% Gel 15 Gram(s) Oral once PRN Blood Glucose LESS THAN 70 milliGRAM(s)/deciliter  glucagon  Injectable 1 milliGRAM(s) IntraMuscular once PRN Glucose LESS THAN 70 milligrams/deciliter  glucagon  Injectable 1 milliGRAM(s) IntraMuscular once PRN Glucose LESS THAN 70 milligrams/deciliter  LORazepam     Tablet 1 milliGRAM(s) Oral once PRN Give 30 minutes prior to MRI      Allergies    penicillin (Unknown)    Intolerances        PERTINENT MEDICATION HISTORY:    SOCIAL HISTORY:  No toxic habits     FAMILY HISTORY:  No pertinent family history in first degree relatives      Vital Signs Last 24 Hrs  T(C): 37 (04 Mar 2019 13:30), Max: 37.4 (03 Mar 2019 20:34)  T(F): 98.6 (04 Mar 2019 13:30), Max: 99.3 (03 Mar 2019 20:34)  HR: 79 (04 Mar 2019 13:30) (64 - 101)  BP: 158/74 (04 Mar 2019 13:30) (123/59 - 171/82)  BP(mean): --  RR: 18 (04 Mar 2019 13:30) (16 - 18)  SpO2: 95% (04 Mar 2019 13:30) (94% - 98%)    Daily     Daily     Physical Exam:  General: obese, nervous   HEENT: EOMI, MMM  CVS: +S1/S2, RRR, temporal a +2 b/l. No carotid, supraclavicular, abdominal aorta, renal a bruits.   Resp: CTA b/l  GI: Soft, NT/ND  MSK: no synovitis. Tender on upper arms/ thighs FROM of shoulders.   Neuro: AAOx3  muscle strength: UE 5/5. LE 2/5, except dorsiflexion/plantarflexion 5/5.   Skin: no  rashes    LABS:                        11.5   7.64  )-----------( 210      ( 04 Mar 2019 08:34 )             35.7         141  |  108  |  33<H>  ----------------------------<  151<H>  3.6   |  19<L>  |  0.88    Ca    9.4      04 Mar 2019 07:18    TPro  7.9  /  Alb  4.1  /  TBili  0.3  /  DBili  x   /  AST  24  /  ALT  19  /  AlkPhos  76  0303    PT/INR - ( 03 Mar 2019 05:26 )   PT: 11.6 sec;   INR: 1.01 ratio         PTT - ( 03 Mar 2019 05:26 )  PTT:26.3 sec  Urinalysis Basic - ( 03 Mar 2019 22:18 )    Color: Yellow / Appearance: Slightly Turbid / S.036 / pH: x  Gluc: x / Ketone: Negative  / Bili: Negative / Urobili: <2 mg/dL   Blood: x / Protein: 30 mg/dL / Nitrite: Negative   Leuk Esterase: Large / RBC: 9 /HPF / WBC 92 /HPF   Sq Epi: x / Non Sq Epi: 5 /HPF / Bacteria: Negative    Sedimentation Rate, Erythrocyte (19 @ 16:59)    Sedimentation Rate, Erythrocyte: 82 mm/hr    Hemoglobin A1C, Whole Blood (19 @ 08:34)    Hemoglobin A1C, Whole Blood: 7.2: Method: Immunoassay    Vitamin B12, Serum (19 @ 17:02)    Vitamin B12, Serum: 191 pg/mL    Thyroid Stimulating Hormone, Serum (19 @ 17:02)    Thyroid Stimulating Hormone, Serum: 3.21 uIU/mL          RADIOLOGY & ADDITIONAL STUDIES:    < from: Xray Pelvis AP only (19 @ 05:21) >  EXAM:  PELVIS AP ONLY                            PROCEDURE DATE:  2019            INTERPRETATION:  CLINICAL INFORMATION: Left hip pain.    EXAM: Frontal view of the pelvis with no prior comparisons.    IMPRESSION:    No acute fracture or dislocation is demonstrated. Preserved hip joint   spaces.                  RUTHANN CARTER M.D., RADIOLOGY RESIDENT  This document has been electronically signed.  PHYLLIS SUTTON M.D., ATTENDING RADIOLOGIST  This document has been electronically signed. Mar  [id number]  9:01AM                < end of copied text >

## 2019-03-04 NOTE — PROGRESS NOTE ADULT - ASSESSMENT
74 year old female with PMH of DM-2, glaucoma, uterine CA s/p hysterectomy, and urinary incontinence; admitted for inability to ambulate secondary to LE pain with some weakness.

## 2019-03-04 NOTE — PHYSICAL THERAPY INITIAL EVALUATION ADULT - PERTINENT HX OF CURRENT PROBLEM, REHAB EVAL
presents brought in by daughter for decreased functionality at home.  She has not been able to stand and walk as normal (usually uses a cane) and had a fall about week ago where she was on the floor for several hours before her daughter came home. she has also been having some cognitive decline with memory and ADLs over the past several weeks to months as Ras has pain in both of her hips and legs, worse on the left and also complains of knee pain but has full ROM of all joints. CXR clear. Pt brought in by daughter for decreased functionality at home.  She has not been able to stand and walk as normal (usually uses a cane) and had a fall about week ago where she was on the floor for several hours before her daughter came home. she has also been having some cognitive decline with memory and ADLs over the past several weeks to months as Ras has pain in both of her hips and legs, worse on the left and also complains of knee pain but has full ROM of all joints. CXR clear.

## 2019-03-04 NOTE — PHYSICAL THERAPY INITIAL EVALUATION ADULT - ACTIVE RANGE OF MOTION EXAMINATION, REHAB EVAL
bilateral upper extremity Active ROM was WFL (within functional limits)/decreased B hip flexion, R worse than L, decreased B knee ext, L worse than R.

## 2019-03-04 NOTE — PROGRESS NOTE ADULT - SUBJECTIVE AND OBJECTIVE BOX
Patient is a 74y old  Female who presents with a chief complaint of Unable to ambulate (04 Mar 2019 14:51)        SUBJECTIVE / OVERNIGHT EVENTS: Patient reports left hip pain and lower back pain. She also reports some knee pain. She also reports LE pain. She denies CP or SOB.       MEDICATIONS  (STANDING):  aspirin enteric coated 81 milliGRAM(s) Oral daily  atorvastatin 20 milliGRAM(s) Oral at bedtime  cyanocobalamin 1000 MICROGram(s) Oral daily  dextrose 5%. 1000 milliLiter(s) (50 mL/Hr) IV Continuous <Continuous>  dextrose 5%. 1000 milliLiter(s) (50 mL/Hr) IV Continuous <Continuous>  dextrose 50% Injectable 12.5 Gram(s) IV Push once  dextrose 50% Injectable 25 Gram(s) IV Push once  dextrose 50% Injectable 25 Gram(s) IV Push once  dextrose 50% Injectable 12.5 Gram(s) IV Push once  dextrose 50% Injectable 25 Gram(s) IV Push once  dextrose 50% Injectable 25 Gram(s) IV Push once  gabapentin 100 milliGRAM(s) Oral two times a day  heparin  Injectable 5000 Unit(s) SubCutaneous every 8 hours  insulin lispro (HumaLOG) corrective regimen sliding scale   SubCutaneous at bedtime  insulin lispro (HumaLOG) corrective regimen sliding scale   SubCutaneous three times a day with meals  latanoprost 0.005% Ophthalmic Solution 1 Drop(s) Both EYES at bedtime    MEDICATIONS  (PRN):  acetaminophen   Tablet .. 650 milliGRAM(s) Oral every 6 hours PRN Moderate Pain (4 - 6)  dextrose 40% Gel 15 Gram(s) Oral once PRN Blood Glucose LESS THAN 70 milliGRAM(s)/deciliter  dextrose 40% Gel 15 Gram(s) Oral once PRN Blood Glucose LESS THAN 70 milliGRAM(s)/deciliter  glucagon  Injectable 1 milliGRAM(s) IntraMuscular once PRN Glucose LESS THAN 70 milligrams/deciliter  glucagon  Injectable 1 milliGRAM(s) IntraMuscular once PRN Glucose LESS THAN 70 milligrams/deciliter  LORazepam     Tablet 1 milliGRAM(s) Oral once PRN Give 30 minutes prior to MRI      Vital Signs Last 24 Hrs  T(C): 36.7 (04 Mar 2019 16:03), Max: 37.4 (03 Mar 2019 20:34)  T(F): 98 (04 Mar 2019 16:03), Max: 99.3 (03 Mar 2019 20:34)  HR: 69 (04 Mar 2019 16:03) (64 - 101)  BP: 103/63 (04 Mar 2019 16:03) (103/63 - 171/82)  BP(mean): --  RR: 18 (04 Mar 2019 16:03) (16 - 18)  SpO2: 96% (04 Mar 2019 16:03) (94% - 98%)  CAPILLARY BLOOD GLUCOSE      POCT Blood Glucose.: 143 mg/dL (04 Mar 2019 12:06)  POCT Blood Glucose.: 162 mg/dL (04 Mar 2019 08:38)  POCT Blood Glucose.: 136 mg/dL (03 Mar 2019 22:08)  POCT Blood Glucose.: 126 mg/dL (03 Mar 2019 17:26)    I&O's Summary    03 Mar 2019 07:01  -  04 Mar 2019 07:00  --------------------------------------------------------  IN: 1240 mL / OUT: 150 mL / NET: 1090 mL    04 Mar 2019 07:01  -  04 Mar 2019 17:11  --------------------------------------------------------  IN: 440 mL / OUT: 400 mL / NET: 40 mL          PHYSICAL EXAM:   GENERAL: NAD, well-developed  HEAD:  Atraumatic, Normocephalic  EYES: Conjunctiva and sclera clear  NECK: Supple  CHEST/LUNG: Clear to auscultation bilaterally; No wheeze  HEART: S1S2 normal. Regular rate and rhythm; No murmurs, rubs, or gallops  ABDOMEN: Soft, obese, Nontender, Nondistended; Bowel sounds present  EXTREMITIES: Bilateral 1-2+ LE edema. Tenderness of LE's. Left hip tenderness reported.   PSYCH/Neuro: Awake and answers questions and follows commands. Weakness in LE's reported. Neuropathic symptoms in hands and legs reported.   SKIN: No rashes or lesions      LABS:                        11.5   7.64  )-----------( 210      ( 04 Mar 2019 08:34 )             35.7         141  |  108  |  33<H>  ----------------------------<  151<H>  3.6   |  19<L>  |  0.88    Ca    9.4      04 Mar 2019 07:18    TPro  7.9  /  Alb  4.1  /  TBili  0.3  /  DBili  x   /  AST  24  /  ALT  19  /  AlkPhos  76      PT/INR - ( 03 Mar 2019 05:26 )   PT: 11.6 sec;   INR: 1.01 ratio         PTT - ( 03 Mar 2019 05:26 )  PTT:26.3 sec  CARDIAC MARKERS ( 03 Mar 2019 05:26 )  x     / x     / 85 U/L / x     / x          Urinalysis Basic - ( 03 Mar 2019 22:18 )    Color: Yellow / Appearance: Slightly Turbid / S.036 / pH: x  Gluc: x / Ketone: Negative  / Bili: Negative / Urobili: <2 mg/dL   Blood: x / Protein: 30 mg/dL / Nitrite: Negative   Leuk Esterase: Large / RBC: 9 /HPF / WBC 92 /HPF   Sq Epi: x / Non Sq Epi: 5 /HPF / Bacteria: Negative      < from: Xray Pelvis AP only (19 @ 05:21) >  IMPRESSION:    No acute fracture or dislocation is demonstrated. Preserved hip joint   spaces.    < end of copied text >    RADIOLOGY & ADDITIONAL TESTS:    Imaging Personally Reviewed: Pelvis X-ray report.   Consultant(s) Notes Reviewed:  Rheum, PT  Care Discussed with Consultants/Other Providers: Rheum fellow

## 2019-03-04 NOTE — PROVIDER CONTACT NOTE (OTHER) - ACTION/TREATMENT ORDERED:
pt received education on  the importance of heparin in order to avoid having clots pt received education on  the importance of heparin in order to avoid having clots, pt was able to verbalized the understanding of importance of Heparin shot

## 2019-03-04 NOTE — PHYSICAL THERAPY INITIAL EVALUATION ADULT - PLANNED THERAPY INTERVENTIONS, PT EVAL
transfer training/strengthening/bed mobility training/Pt will negotiate up/down 5 steps independently with appropriate assistive device and railing in 4 weeks/gait training/balance training

## 2019-03-04 NOTE — PROVIDER CONTACT NOTE (OTHER) - ACTION/TREATMENT ORDERED:
As per NP apply PAS and encourage pt perform ankle pumps and increase ambulation. Pt educated on increased risk for DTV/PE. Pt verbalized understanding of teaching. Will continue to monitor

## 2019-03-05 LAB
ANION GAP SERPL CALC-SCNC: 15 MMOL/L — SIGNIFICANT CHANGE UP (ref 5–17)
BUN SERPL-MCNC: 26 MG/DL — HIGH (ref 7–23)
CALCIUM SERPL-MCNC: 9.6 MG/DL — SIGNIFICANT CHANGE UP (ref 8.4–10.5)
CHLORIDE SERPL-SCNC: 105 MMOL/L — SIGNIFICANT CHANGE UP (ref 96–108)
CO2 SERPL-SCNC: 20 MMOL/L — LOW (ref 22–31)
CREAT SERPL-MCNC: 0.95 MG/DL — SIGNIFICANT CHANGE UP (ref 0.5–1.3)
CRP SERPL-MCNC: 0.22 MG/DL — SIGNIFICANT CHANGE UP (ref 0–0.4)
CULTURE RESULTS: SIGNIFICANT CHANGE UP
FOLATE SERPL-MCNC: 10.8 NG/ML — SIGNIFICANT CHANGE UP
GLUCOSE SERPL-MCNC: 212 MG/DL — HIGH (ref 70–99)
HCT VFR BLD CALC: 40.2 % — SIGNIFICANT CHANGE UP (ref 34.5–45)
HGB BLD-MCNC: 13.2 G/DL — SIGNIFICANT CHANGE UP (ref 11.5–15.5)
MCHC RBC-ENTMCNC: 30.3 PG — SIGNIFICANT CHANGE UP (ref 27–34)
MCHC RBC-ENTMCNC: 32.8 GM/DL — SIGNIFICANT CHANGE UP (ref 32–36)
MCV RBC AUTO: 92.2 FL — SIGNIFICANT CHANGE UP (ref 80–100)
PLATELET # BLD AUTO: 237 K/UL — SIGNIFICANT CHANGE UP (ref 150–400)
POTASSIUM SERPL-MCNC: 4.5 MMOL/L — SIGNIFICANT CHANGE UP (ref 3.5–5.3)
POTASSIUM SERPL-SCNC: 4.5 MMOL/L — SIGNIFICANT CHANGE UP (ref 3.5–5.3)
RBC # BLD: 4.36 M/UL — SIGNIFICANT CHANGE UP (ref 3.8–5.2)
RBC # FLD: 14.4 % — SIGNIFICANT CHANGE UP (ref 10.3–14.5)
SODIUM SERPL-SCNC: 140 MMOL/L — SIGNIFICANT CHANGE UP (ref 135–145)
SPECIMEN SOURCE: SIGNIFICANT CHANGE UP
WBC # BLD: 9.28 K/UL — SIGNIFICANT CHANGE UP (ref 3.8–10.5)
WBC # FLD AUTO: 9.28 K/UL — SIGNIFICANT CHANGE UP (ref 3.8–10.5)

## 2019-03-05 PROCEDURE — 72040 X-RAY EXAM NECK SPINE 2-3 VW: CPT | Mod: 26

## 2019-03-05 PROCEDURE — 99233 SBSQ HOSP IP/OBS HIGH 50: CPT

## 2019-03-05 PROCEDURE — 72197 MRI PELVIS W/O & W/DYE: CPT | Mod: 26

## 2019-03-05 RX ORDER — GABAPENTIN 400 MG/1
100 CAPSULE ORAL THREE TIMES A DAY
Qty: 0 | Refills: 0 | Status: DISCONTINUED | OUTPATIENT
Start: 2019-03-05 | End: 2019-03-07

## 2019-03-05 RX ORDER — INSULIN LISPRO 100/ML
2 VIAL (ML) SUBCUTANEOUS
Qty: 0 | Refills: 0 | Status: DISCONTINUED | OUTPATIENT
Start: 2019-03-05 | End: 2019-03-06

## 2019-03-05 RX ADMIN — Medication 650 MILLIGRAM(S): at 05:28

## 2019-03-05 RX ADMIN — ATORVASTATIN CALCIUM 20 MILLIGRAM(S): 80 TABLET, FILM COATED ORAL at 21:40

## 2019-03-05 RX ADMIN — Medication 2 UNIT(S): at 18:42

## 2019-03-05 RX ADMIN — Medication 81 MILLIGRAM(S): at 11:40

## 2019-03-05 RX ADMIN — HEPARIN SODIUM 5000 UNIT(S): 5000 INJECTION INTRAVENOUS; SUBCUTANEOUS at 13:51

## 2019-03-05 RX ADMIN — Medication 1 MILLIGRAM(S): at 20:15

## 2019-03-05 RX ADMIN — GABAPENTIN 100 MILLIGRAM(S): 400 CAPSULE ORAL at 05:27

## 2019-03-05 RX ADMIN — LATANOPROST 1 DROP(S): 0.05 SOLUTION/ DROPS OPHTHALMIC; TOPICAL at 21:40

## 2019-03-05 RX ADMIN — Medication 6: at 12:37

## 2019-03-05 RX ADMIN — HEPARIN SODIUM 5000 UNIT(S): 5000 INJECTION INTRAVENOUS; SUBCUTANEOUS at 05:28

## 2019-03-05 RX ADMIN — GABAPENTIN 100 MILLIGRAM(S): 400 CAPSULE ORAL at 21:40

## 2019-03-05 RX ADMIN — Medication 4: at 08:00

## 2019-03-05 RX ADMIN — Medication 2 UNIT(S): at 12:37

## 2019-03-05 RX ADMIN — PREGABALIN 1000 MICROGRAM(S): 225 CAPSULE ORAL at 11:40

## 2019-03-05 RX ADMIN — Medication 650 MILLIGRAM(S): at 06:30

## 2019-03-05 RX ADMIN — Medication 30 MILLIGRAM(S): at 05:27

## 2019-03-05 RX ADMIN — Medication 4: at 18:42

## 2019-03-05 RX ADMIN — HEPARIN SODIUM 5000 UNIT(S): 5000 INJECTION INTRAVENOUS; SUBCUTANEOUS at 21:40

## 2019-03-05 NOTE — DIETITIAN INITIAL EVALUATION ADULT. - PHYSICAL APPEARANCE
obese/BMI 42.9, Pt visually appears well nourished with no signs of muscle wasting or fat depletion./other (specify)

## 2019-03-05 NOTE — CHART NOTE - NSCHARTNOTEFT_GEN_A_CORE
Upon Nutritional Assessment by the Registered Dietitian your patient was determined to meet criteria / has evidence of the following diagnosis/diagnoses:          [ ]  Mild Protein Calorie Malnutrition        [ ]  Moderate Protein Calorie Malnutrition        [ ] Severe Protein Calorie Malnutrition        [ ] Unspecified Protein Calorie Malnutrition        [ ] Underweight / BMI <19        [x] Morbid Obesity / BMI > 40      Findings as based on:  [x] Comprehensive nutrition assessment   [ ] Nutrition Focused Physical Exam  [x] Other: BMI 42.9 related to likely h/o excess energy intake and sedentary lifestyle, limited diet recall available secondary to short term memory loss      Nutrition Plan/Recommendations:    1. Continue current Consistent Carbohydrate (with evening snacks) diet as indicated. Promote adequate BG control, recommend adjust insulin as needed.   2. Pt is not an appropriate nutrition education candidate at this time given cognitive deficit.     RD remains available. Dulce Maria Cheatham RD, CDN Pager: [phone]       PROVIDER Section:     By signing this assessment you are acknowledging and agree with the diagnosis/diagnoses assigned by the Registered Dietitian    Comments:

## 2019-03-05 NOTE — PROGRESS NOTE ADULT - SUBJECTIVE AND OBJECTIVE BOX
Patient is a 74y old  Female who presents with a chief complaint of Unable to ambulate (05 Mar 2019 19:24)        SUBJECTIVE / OVERNIGHT EVENTS: Patient reports her LE and left hip pain is about the same today. She denies CP or SOB.       MEDICATIONS  (STANDING):  aspirin enteric coated 81 milliGRAM(s) Oral daily  atorvastatin 20 milliGRAM(s) Oral at bedtime  cyanocobalamin 1000 MICROGram(s) Oral daily  dextrose 5%. 1000 milliLiter(s) (50 mL/Hr) IV Continuous <Continuous>  dextrose 5%. 1000 milliLiter(s) (50 mL/Hr) IV Continuous <Continuous>  dextrose 50% Injectable 12.5 Gram(s) IV Push once  dextrose 50% Injectable 25 Gram(s) IV Push once  dextrose 50% Injectable 25 Gram(s) IV Push once  dextrose 50% Injectable 12.5 Gram(s) IV Push once  dextrose 50% Injectable 25 Gram(s) IV Push once  dextrose 50% Injectable 25 Gram(s) IV Push once  gabapentin 100 milliGRAM(s) Oral three times a day  heparin  Injectable 5000 Unit(s) SubCutaneous every 8 hours  insulin lispro (HumaLOG) corrective regimen sliding scale   SubCutaneous at bedtime  insulin lispro (HumaLOG) corrective regimen sliding scale   SubCutaneous three times a day with meals  insulin lispro Injectable (HumaLOG) 2 Unit(s) SubCutaneous three times a day with meals  latanoprost 0.005% Ophthalmic Solution 1 Drop(s) Both EYES at bedtime  predniSONE   Tablet 30 milliGRAM(s) Oral daily    MEDICATIONS  (PRN):  acetaminophen   Tablet .. 650 milliGRAM(s) Oral every 6 hours PRN Moderate Pain (4 - 6)  dextrose 40% Gel 15 Gram(s) Oral once PRN Blood Glucose LESS THAN 70 milliGRAM(s)/deciliter  dextrose 40% Gel 15 Gram(s) Oral once PRN Blood Glucose LESS THAN 70 milliGRAM(s)/deciliter  glucagon  Injectable 1 milliGRAM(s) IntraMuscular once PRN Glucose LESS THAN 70 milligrams/deciliter  glucagon  Injectable 1 milliGRAM(s) IntraMuscular once PRN Glucose LESS THAN 70 milligrams/deciliter      Vital Signs Last 24 Hrs  T(C): 36.9 (05 Mar 2019 22:10), Max: 36.9 (04 Mar 2019 23:48)  T(F): 98.4 (05 Mar 2019 22:10), Max: 98.4 (04 Mar 2019 23:48)  HR: 86 (05 Mar 2019 22:10) (85 - 91)  BP: 145/75 (05 Mar 2019 22:10) (125/58 - 145/75)  BP(mean): --  RR: 18 (05 Mar 2019 22:10) (18 - 18)  SpO2: 98% (05 Mar 2019 22:10) (95% - 98%)  CAPILLARY BLOOD GLUCOSE      POCT Blood Glucose.: 165 mg/dL (05 Mar 2019 22:08)  POCT Blood Glucose.: 224 mg/dL (05 Mar 2019 18:36)  POCT Blood Glucose.: 276 mg/dL (05 Mar 2019 12:27)  POCT Blood Glucose.: 217 mg/dL (05 Mar 2019 07:48)    I&O's Summary    04 Mar 2019 07:01  -  05 Mar 2019 07:00  --------------------------------------------------------  IN: 920 mL / OUT: 400 mL / NET: 520 mL    05 Mar 2019 07:01  -  05 Mar 2019 22:50  --------------------------------------------------------  IN: 840 mL / OUT: 800 mL / NET: 40 mL          PHYSICAL EXAM:   GENERAL: NAD, well-developed  HEAD:  Atraumatic, Normocephalic  EYES: Conjunctiva and sclera clear  NECK: Supple  CHEST/LUNG: Clear to auscultation bilaterally; No wheeze  HEART: S1S2 normal. Regular rate and rhythm; No murmurs, rubs, or gallops  ABDOMEN: Soft, obese, Nontender, Nondistended; Bowel sounds present  EXTREMITIES: 1-2+ LE edema.  PSYCH/Neuro: AAOx3. Subjective neuropathy in hands (L>R) and LE's. Left hip pain. LE pain.    SKIN: No rashes or lesions      LABS:                        13.2   9.28  )-----------( 237      ( 05 Mar 2019 09:35 )             40.2     03-05    140  |  105  |  26<H>  ----------------------------<  212<H>  4.5   |  20<L>  |  0.95    Ca    9.6      05 Mar 2019 07:05        < from: Xray Cervical Spine AP and Lateral Only (03.05.19 @ 09:10) >  Impression: Visualization on the lateral view is down to C7. There is a   minimal retrolisthesis of C3 on C4 with moderate disc height loss at   C3-C4 and C5-C6. There is uncovertebral arthropathy greatest at C4-C5. No   acute fracture is identified. There is no prevertebral soft tissue   swelling. A mild levoconvex curvature could be positional.    < end of copied text >      RADIOLOGY & ADDITIONAL TESTS:    Imaging Personally Reviewed: Cervical spine X-ray report  Consultant(s) Notes Reviewed:  Rheumatology  Care Discussed with Consultants/Other Providers: Rheum fellow

## 2019-03-05 NOTE — DIETITIAN INITIAL EVALUATION ADULT. - NS AS NUTRI INTERV ED CONTENT
Other (specify)/Pt is not an appropriate nutrition education candidate at this time given cognitive deficit. RD will continue to follow.

## 2019-03-05 NOTE — DIETITIAN INITIAL EVALUATION ADULT. - ENERGY NEEDS
Height: 64 inches, Weight: 250 pounds (dosing)  BMI: 42.9 kg/m2 IBW: 120 pounds (+/-10%), %IBW: 208%  Pertinent Info: No edema noted, no pressure injuries noted at this time in nursing flow sheet.  Other pertinent info: Pt is 74yoF with PMH significant for T2DM, uterine CA s/p hysterectomy, presenting with cognitive declines, decreased functionality at home, fall 1 week ago, and inagility to ambulate secondary to b/l LE pain. Noted with metabolic acidosis on admit. rheumatology following, possible polymyalgia rheumatica per note. Pending further work up at this time.

## 2019-03-05 NOTE — DIETITIAN INITIAL EVALUATION ADULT. - OTHER INFO
Pt seen for BMI >40 on 7TOW. Pt reports good po intake in-patient x 3 days so far. Pt denies chewing/swallowing difficulties. No c/o nausea, vomiting, diarrhea, or constipation. +BM yesterday per pt. Pt reports no recent wt changes that she knows of. Dosing wt noted as 250 lbs. Pt is not a nutrition education candidate given cognitive deficit. See recommendations below.

## 2019-03-05 NOTE — PROGRESS NOTE ADULT - SUBJECTIVE AND OBJECTIVE BOX
[facility]  [id number]    INTERVAL HPI/OVERNIGHT EVENTS:  Pt says pain is the same. No change since steroids.     MEDICATIONS  (STANDING):  aspirin enteric coated 81 milliGRAM(s) Oral daily  atorvastatin 20 milliGRAM(s) Oral at bedtime  cyanocobalamin 1000 MICROGram(s) Oral daily  dextrose 5%. 1000 milliLiter(s) (50 mL/Hr) IV Continuous <Continuous>  dextrose 5%. 1000 milliLiter(s) (50 mL/Hr) IV Continuous <Continuous>  dextrose 50% Injectable 12.5 Gram(s) IV Push once  dextrose 50% Injectable 25 Gram(s) IV Push once  dextrose 50% Injectable 25 Gram(s) IV Push once  dextrose 50% Injectable 12.5 Gram(s) IV Push once  dextrose 50% Injectable 25 Gram(s) IV Push once  dextrose 50% Injectable 25 Gram(s) IV Push once  gabapentin 100 milliGRAM(s) Oral three times a day  heparin  Injectable 5000 Unit(s) SubCutaneous every 8 hours  insulin lispro (HumaLOG) corrective regimen sliding scale   SubCutaneous at bedtime  insulin lispro (HumaLOG) corrective regimen sliding scale   SubCutaneous three times a day with meals  insulin lispro Injectable (HumaLOG) 2 Unit(s) SubCutaneous three times a day with meals  latanoprost 0.005% Ophthalmic Solution 1 Drop(s) Both EYES at bedtime  predniSONE   Tablet 30 milliGRAM(s) Oral daily    MEDICATIONS  (PRN):  acetaminophen   Tablet .. 650 milliGRAM(s) Oral every 6 hours PRN Moderate Pain (4 - 6)  dextrose 40% Gel 15 Gram(s) Oral once PRN Blood Glucose LESS THAN 70 milliGRAM(s)/deciliter  dextrose 40% Gel 15 Gram(s) Oral once PRN Blood Glucose LESS THAN 70 milliGRAM(s)/deciliter  glucagon  Injectable 1 milliGRAM(s) IntraMuscular once PRN Glucose LESS THAN 70 milligrams/deciliter  glucagon  Injectable 1 milliGRAM(s) IntraMuscular once PRN Glucose LESS THAN 70 milligrams/deciliter  LORazepam     Tablet 1 milliGRAM(s) Oral once PRN Give 30 minutes prior to MRI      Allergies    penicillin (Unknown)    Intolerances          Review of Systems:  Gen:  No fevers/chills, weight loss  HEENT: per HPI   CVS: No chest pain/palpitations  Resp: No SOB/wheezing  GI: No N/V/C/D/abdominal pain  MSK: per HPI   Skin: No new rashes      Vital Signs Last 24 Hrs  T(C): 36.9 (05 Mar 2019 16:03), Max: 37 (04 Mar 2019 21:25)  T(F): 98.4 (05 Mar 2019 16:03), Max: 98.6 (04 Mar 2019 21:25)  HR: 91 (05 Mar 2019 16:03) (85 - 92)  BP: 125/58 (05 Mar 2019 16:03) (125/58 - 144/74)  BP(mean): --  RR: 18 (05 Mar 2019 16:03) (18 - 18)  SpO2: 96% (05 Mar 2019 16:03) (94% - 96%)    Physical Exam:  General: obese, nervous   HEENT: EOMI, MMM  CVS: +S1/S2, RRR,   Resp: CTA b/l  GI: Soft, NT/ND  MSK: no synovitis. Non tender on upper arms/thighs. However, upon second visit, pt tender on upper arms/ thighs FROM of shoulders.   Neuro: AAOx3  Skin: no  rashes      LABS:                        13.2   9.28  )-----------( 237      ( 05 Mar 2019 09:35 )             40.2     03-05    140  |  105  |  26<H>  ----------------------------<  212<H>  4.5   |  20<L>  |  0.95    Ca    9.6      05 Mar 2019 07:05        Urinalysis Basic - ( 03 Mar 2019 22:18 )    Color: Yellow / Appearance: Slightly Turbid / S.036 / pH: x  Gluc: x / Ketone: Negative  / Bili: Negative / Urobili: <2 mg/dL   Blood: x / Protein: 30 mg/dL / Nitrite: Negative   Leuk Esterase: Large / RBC: 9 /HPF / WBC 92 /HPF   Sq Epi: x / Non Sq Epi: 5 /HPF / Bacteria: Negative          RADIOLOGY & ADDITIONAL TESTS:    < from: Xray Cervical Spine AP and Lateral Only (19 @ 09:10) >  EXAM:  C-SPINE AP LAT ONLY MAX 3 V                            PROCEDURE DATE:  2019            INTERPRETATION:  Clinical information: Chronic pains and neuropathy.    3 views of the cervical spine are without comparison.    Impression: Visualization on the lateral view is down to C7. There is a   minimal retrolisthesis of C3 on C4 with moderate disc height loss at   C3-C4 and C5-C6. There is uncovertebral arthropathy greatest at C4-C5. No   acute fracture is identified. There is no prevertebral soft tissue   swelling. A mild levoconvex curvature could be positional.                    YUE NOLASCO M.D., ATTENDING RADIOLOGIST  This document has been electronically signed. Mar  5 2019  2:49PM                < end of copied text >

## 2019-03-05 NOTE — DIETITIAN INITIAL EVALUATION ADULT. - ORAL INTAKE PTA
good/good po intake reported PTA. NKFA reported. No vitamin/mineral/herbal supplementation PTA reported.

## 2019-03-05 NOTE — DIETITIAN INITIAL EVALUATION ADULT. - ADHERENCE
Pt reports no specific therapeutic diet followed PTA. Pt reports she used to SMBG at home but recently she hasn't, she is unsure why she stopped. She is unsure if she has an endocrinologist. She is initially unsure if she is taking any medication to control her diabetes, but is able to recall Janumet as noted in her H&P. Reports no insulin use PTA. Last HgbA1c noted as 7.2% indicating largely adequate BG control PTA.

## 2019-03-05 NOTE — PROGRESS NOTE ADULT - ASSESSMENT
74F hx of DM2, urinary incontinence presents w myalgias of shoulders, upper arms, thighs in setting of high ESR  -pt seemed to improve this am, however, upon revisit in afternoon, pt  on upper arms, thighs, but also other areas/ FM component?   -pt has some arthrosis on cervical spine, which can be findings of radiculopathy contributing to L hand symptoms  -weakness likely decompensation from long period of immobility    Recommend  c/w Pred 30mg, pt just had 1 dose, will reevuluate for improvement  agree w gabapentin   PT    Rafita Yin MD PGY4

## 2019-03-05 NOTE — PROGRESS NOTE ADULT - ATTENDING COMMENTS
Marciano Mathias MD  Division of [facility]  Cell: [phone]  Pager: [phone]  Office: [phone]

## 2019-03-05 NOTE — DIETITIAN INITIAL EVALUATION ADULT. - PT NOT SOURCE
other (specify)/pt noted with short term memory loss per chart and cognitive decline PTA. Able to answer some questions appropriately, but memory loss is evident.

## 2019-03-05 NOTE — DIETITIAN INITIAL EVALUATION ADULT. - NS AS NUTRI INTERV MEALS SNACK
General/healthful diet/Continue current Consistent Carbohydrate (with evening snacks) diet as indicated. Promote adequate BG control, recommend adjust insulin as needed. RD remains available.

## 2019-03-06 ENCOUNTER — TRANSCRIPTION ENCOUNTER (OUTPATIENT)
Age: 74
End: 2019-03-06

## 2019-03-06 DIAGNOSIS — E11.65 TYPE 2 DIABETES MELLITUS WITH HYPERGLYCEMIA: ICD-10-CM

## 2019-03-06 DIAGNOSIS — E78.5 HYPERLIPIDEMIA, UNSPECIFIED: ICD-10-CM

## 2019-03-06 DIAGNOSIS — I10 ESSENTIAL (PRIMARY) HYPERTENSION: ICD-10-CM

## 2019-03-06 LAB — IF BLOCK AB SER-ACNC: SIGNIFICANT CHANGE UP

## 2019-03-06 PROCEDURE — 99222 1ST HOSP IP/OBS MODERATE 55: CPT

## 2019-03-06 PROCEDURE — 99233 SBSQ HOSP IP/OBS HIGH 50: CPT

## 2019-03-06 RX ORDER — INSULIN LISPRO 100/ML
6 VIAL (ML) SUBCUTANEOUS
Qty: 0 | Refills: 0 | Status: DISCONTINUED | OUTPATIENT
Start: 2019-03-06 | End: 2019-03-07

## 2019-03-06 RX ORDER — INSULIN GLARGINE 100 [IU]/ML
10 INJECTION, SOLUTION SUBCUTANEOUS AT BEDTIME
Qty: 0 | Refills: 0 | Status: DISCONTINUED | OUTPATIENT
Start: 2019-03-06 | End: 2019-03-07

## 2019-03-06 RX ORDER — HYDROCHLOROTHIAZIDE 25 MG
12.5 TABLET ORAL DAILY
Qty: 0 | Refills: 0 | Status: DISCONTINUED | OUTPATIENT
Start: 2019-03-06 | End: 2019-03-07

## 2019-03-06 RX ADMIN — HEPARIN SODIUM 5000 UNIT(S): 5000 INJECTION INTRAVENOUS; SUBCUTANEOUS at 21:30

## 2019-03-06 RX ADMIN — HEPARIN SODIUM 5000 UNIT(S): 5000 INJECTION INTRAVENOUS; SUBCUTANEOUS at 13:02

## 2019-03-06 RX ADMIN — INSULIN GLARGINE 10 UNIT(S): 100 INJECTION, SOLUTION SUBCUTANEOUS at 21:30

## 2019-03-06 RX ADMIN — Medication 2 UNIT(S): at 09:59

## 2019-03-06 RX ADMIN — Medication 650 MILLIGRAM(S): at 12:48

## 2019-03-06 RX ADMIN — GABAPENTIN 100 MILLIGRAM(S): 400 CAPSULE ORAL at 21:30

## 2019-03-06 RX ADMIN — GABAPENTIN 100 MILLIGRAM(S): 400 CAPSULE ORAL at 05:02

## 2019-03-06 RX ADMIN — Medication 650 MILLIGRAM(S): at 12:18

## 2019-03-06 RX ADMIN — Medication 4: at 09:59

## 2019-03-06 RX ADMIN — Medication 6 UNIT(S): at 18:29

## 2019-03-06 RX ADMIN — Medication 6: at 12:58

## 2019-03-06 RX ADMIN — Medication 30 MILLIGRAM(S): at 05:02

## 2019-03-06 RX ADMIN — Medication 81 MILLIGRAM(S): at 12:18

## 2019-03-06 RX ADMIN — HEPARIN SODIUM 5000 UNIT(S): 5000 INJECTION INTRAVENOUS; SUBCUTANEOUS at 05:02

## 2019-03-06 RX ADMIN — Medication 6: at 18:28

## 2019-03-06 RX ADMIN — GABAPENTIN 100 MILLIGRAM(S): 400 CAPSULE ORAL at 13:02

## 2019-03-06 RX ADMIN — LATANOPROST 1 DROP(S): 0.05 SOLUTION/ DROPS OPHTHALMIC; TOPICAL at 21:30

## 2019-03-06 RX ADMIN — ATORVASTATIN CALCIUM 20 MILLIGRAM(S): 80 TABLET, FILM COATED ORAL at 21:30

## 2019-03-06 RX ADMIN — PREGABALIN 1000 MICROGRAM(S): 225 CAPSULE ORAL at 12:18

## 2019-03-06 RX ADMIN — Medication 2 UNIT(S): at 12:59

## 2019-03-06 NOTE — PROGRESS NOTE ADULT - ATTENDING COMMENTS
Marciano Mathias MD  Division of [facility] Medicine  Cell: [phone]  Pager: [phone]  Office: [phone]

## 2019-03-06 NOTE — DISCHARGE NOTE PROVIDER - HOSPITAL COURSE
74 year old female with PMH of DM-2, glaucoma, uterine CA s/p hysterectomy, and urinary incontinence; admitted for inability to ambulate secondary to LE pain with some weakness.           Problem/Plan - 1:    ·  Problem: Leg pain, bilateral.  Plan: -Unlikely hip fracture since no fracture on X-ray, however she does report a ?fall about a week ago afterwards she was unable to get up.     -Neuropathy vs. ostearthritis vs. PMR? vs. other inflammatory process.     -HbA1c 7.2, ESR 82, and CK normal, CRP normal.     -Rheum recs appreciated; s/p trial of prednisone 30mg daily for possible PMR. But seems like patient may not be responding or only minimally responding, which makes PMR less likely. Patient possibly has fibromyalgia. Will therefore DC prednisone.      -C/w Gabapentin at 100mg TID and can increase further if tolerated as an outpatient. So far helping.     -PT eval recs home with home PT.     -MRI pelvis C+/C- showed Moderate pubic symphysis arthrosis. Disc height loss at the lower lumbar spine. No left hip joint effusion, acute fracture or metastatic lesion.    -Tylenol PRN.    -Orthostatics were negative.          Problem/Plan - 2:    ·  Problem: Memory loss.  Plan: -B12 low at 191, folate normal, TSH within normal, RPR negative, UA appeared positive but got UCx which grew normal andra, and VBG not consistent with CO2 retention or acidosis.     -C/w Vitamin B12 1000mcg daily for low B12.    -Intrinsic factor Ab returned positive, therefore patient may benefit from B12 injections as an outpatient.          Problem/Plan - 3:    ·  Problem: Metabolic acidosis.  Plan: -Patient with low CO2 on BMP, but VBG with normal pH.     -Lactate normal, BG not extremely elevated. Increased AG on admission returned to normal. No ketones in UA.     -Continue to monitor BMP.          Problem/Plan - 4:    ·  Problem: Type 2 diabetes mellitus without complication, without long-term current use of insulin.  Plan: -Hyperglycemia induced by prednisone, but should improve now that we are stopping the prednisone.     -HbA1c 7.2.    -C/w FLORENTIN.     -Endocrine recs appreciated for Lantus and premeal insulin and DC regimen plan.     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 -C/w Gabapentin.     -C/w atorvastatin and ASA.    -C/w carbohydrate consistent diet.    -Will start HCTZ 12.5mg daily for elevated BP and lower extremity edema. This can be adjusted as an outpatient by PMD.    -Nutrition recs appreciated.

## 2019-03-06 NOTE — CONSULT NOTE ADULT - PROBLEM SELECTOR RECOMMENDATION 9
Diabetes Education and Nutrition Eval  Start Lantus 10 units qhs  Start Humalog 6 units qac  Low correction scale qac + bedtime  Goal glucose 100-180  Outpt. endo follow-up  Outpt. optho, podiatry, nephrology  Plan to d/c on home regimen.

## 2019-03-06 NOTE — PROGRESS NOTE ADULT - SUBJECTIVE AND OBJECTIVE BOX
OMA SPRING  [id number]    INTERVAL HPI/OVERNIGHT EVENTS:  Pt w improvement in L hip pain, but upper arm and thighs myalgias persistent    MEDICATIONS  (STANDING):  aspirin enteric coated 81 milliGRAM(s) Oral daily  atorvastatin 20 milliGRAM(s) Oral at bedtime  cyanocobalamin 1000 MICROGram(s) Oral daily  dextrose 5%. 1000 milliLiter(s) (50 mL/Hr) IV Continuous <Continuous>  dextrose 5%. 1000 milliLiter(s) (50 mL/Hr) IV Continuous <Continuous>  dextrose 50% Injectable 12.5 Gram(s) IV Push once  dextrose 50% Injectable 25 Gram(s) IV Push once  dextrose 50% Injectable 25 Gram(s) IV Push once  dextrose 50% Injectable 12.5 Gram(s) IV Push once  dextrose 50% Injectable 25 Gram(s) IV Push once  dextrose 50% Injectable 25 Gram(s) IV Push once  gabapentin 100 milliGRAM(s) Oral three times a day  heparin  Injectable 5000 Unit(s) SubCutaneous every 8 hours  insulin lispro (HumaLOG) corrective regimen sliding scale   SubCutaneous at bedtime  insulin lispro (HumaLOG) corrective regimen sliding scale   SubCutaneous three times a day with meals  insulin lispro Injectable (HumaLOG) 2 Unit(s) SubCutaneous three times a day with meals  latanoprost 0.005% Ophthalmic Solution 1 Drop(s) Both EYES at bedtime  predniSONE   Tablet 30 milliGRAM(s) Oral daily    MEDICATIONS  (PRN):  acetaminophen   Tablet .. 650 milliGRAM(s) Oral every 6 hours PRN Moderate Pain (4 - 6)  dextrose 40% Gel 15 Gram(s) Oral once PRN Blood Glucose LESS THAN 70 milliGRAM(s)/deciliter  dextrose 40% Gel 15 Gram(s) Oral once PRN Blood Glucose LESS THAN 70 milliGRAM(s)/deciliter  glucagon  Injectable 1 milliGRAM(s) IntraMuscular once PRN Glucose LESS THAN 70 milligrams/deciliter  glucagon  Injectable 1 milliGRAM(s) IntraMuscular once PRN Glucose LESS THAN 70 milligrams/deciliter      Allergies    penicillin (Unknown)    Intolerances        Review of Systems:  Gen:  No fevers/chills, weight loss  HEENT: no oral ulcers   CVS: No chest pain/palpitations  Resp: No SOB/wheezing  GI: No N/V/C/D/abdominal pain  MSK: per HPI   Skin: No new rashes      Vital Signs Last 24 Hrs  T(C): 37.1 (06 Mar 2019 13:48), Max: 37.1 (06 Mar 2019 13:48)  T(F): 98.8 (06 Mar 2019 13:48), Max: 98.8 (06 Mar 2019 13:48)  HR: 85 (06 Mar 2019 13:48) (77 - 96)  BP: 168/86 (06 Mar 2019 13:48) (125/58 - 168/86)  BP(mean): --  RR: 17 (06 Mar 2019 13:48) (17 - 18)  SpO2: 96% (06 Mar 2019 13:48) (96% - 98%)    Physical Exam:  General: obese, nervous   HEENT: EOMI, MMM  CVS: +S1/S2, RRR,   Resp: CTA b/l  GI: Soft, NT/ND  MSK: no synovitis. Minimally tender on upper arms, thighs   Neuro: AAOx3  Skin: no  rashes    LABS:                        13.2   9.28  )-----------( 237      ( 05 Mar 2019 09:35 )             40.2     03-05    140  |  105  |  26<H>  ----------------------------<  212<H>  4.5   |  20<L>  |  0.95    Ca    9.6      05 Mar 2019 07:05              RADIOLOGY & ADDITIONAL TESTS:      < from: MR Pelvis Bony Only w/wo IV Cont (03.05.19 @ 21:23) >  EXAM:  MR PELVIS BONY ONLY WAW IC                            PROCEDURE DATE:  03/05/2019            INTERPRETATION:  Clinical Information: Worsening left hip pain, elevated   Ramos reduced mobility. Uterine cancer.    Comparison: Pelvic radiographfrom 3/3/2019.    Technique: MRI of the pelvis was performed utilizing multiplanar imaging   before and after the initiation of 10 cc of Gadavist contrast. 0 cc were   discarded.    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 There is disc height loss at   the lower lumbar spine. The femoral heads are normal in signal. There is   no hip joint effusion. The solomon are not well evaluated on this pelvis   study although there could be anterior labral tearing bilaterally which   is of unlikely clinical significance.    The tendon insertions and origins are intact. There is minimal   hyperintense T2 signal anterior adjacent to the right gluteus maximums   tendon at the level the greater trochanter which does not enhance and is   of unlikely clinical significance.     The sacroiliac joints appear unremarkable. There is mild right greater   left lateral subcutaneous edema. The sciatic nerves appear unremarkable.    Impression: Moderate pubic symphysis arthrosis. Disc height loss atthe   lower lumbar spine. No left hip joint effusion, acute fracture or   metastatic lesion.    < end of copied text > OMA SPRING  [id number]    INTERVAL HPI/OVERNIGHT EVENTS:  Pt w improvement in L hip pain, but upper arm and thighs myalgias persistent    MEDICATIONS  (STANDING):  aspirin enteric coated 81 milliGRAM(s) Oral daily  atorvastatin 20 milliGRAM(s) Oral at bedtime  cyanocobalamin 1000 MICROGram(s) Oral daily  dextrose 5%. 1000 milliLiter(s) (50 mL/Hr) IV Continuous <Continuous>  dextrose 5%. 1000 milliLiter(s) (50 mL/Hr) IV Continuous <Continuous>  dextrose 50% Injectable 12.5 Gram(s) IV Push once  dextrose 50% Injectable 25 Gram(s) IV Push once  dextrose 50% Injectable 25 Gram(s) IV Push once  dextrose 50% Injectable 12.5 Gram(s) IV Push once  dextrose 50% Injectable 25 Gram(s) IV Push once  dextrose 50% Injectable 25 Gram(s) IV Push once  gabapentin 100 milliGRAM(s) Oral three times a day  heparin  Injectable 5000 Unit(s) SubCutaneous every 8 hours  insulin lispro (HumaLOG) corrective regimen sliding scale   SubCutaneous at bedtime  insulin lispro (HumaLOG) corrective regimen sliding scale   SubCutaneous three times a day with meals  insulin lispro Injectable (HumaLOG) 2 Unit(s) SubCutaneous three times a day with meals  latanoprost 0.005% Ophthalmic Solution 1 Drop(s) Both EYES at bedtime  predniSONE   Tablet 30 milliGRAM(s) Oral daily    MEDICATIONS  (PRN):  acetaminophen   Tablet .. 650 milliGRAM(s) Oral every 6 hours PRN Moderate Pain (4 - 6)  dextrose 40% Gel 15 Gram(s) Oral once PRN Blood Glucose LESS THAN 70 milliGRAM(s)/deciliter  dextrose 40% Gel 15 Gram(s) Oral once PRN Blood Glucose LESS THAN 70 milliGRAM(s)/deciliter  glucagon  Injectable 1 milliGRAM(s) IntraMuscular once PRN Glucose LESS THAN 70 milligrams/deciliter  glucagon  Injectable 1 milliGRAM(s) IntraMuscular once PRN Glucose LESS THAN 70 milligrams/deciliter      Allergies    penicillin (Unknown)    Intolerances        Review of Systems:  Gen:  No fevers/chills, weight loss  HEENT: no oral ulcers   CVS: No chest pain/palpitations  Resp: No SOB/wheezing  GI: No N/V/C/D/abdominal pain  MSK: per HPI   Skin: No new rashes      Vital Signs Last 24 Hrs  T(C): 37.1 (06 Mar 2019 13:48), Max: 37.1 (06 Mar 2019 13:48)  T(F): 98.8 (06 Mar 2019 13:48), Max: 98.8 (06 Mar 2019 13:48)  HR: 85 (06 Mar 2019 13:48) (77 - 96)  BP: 168/86 (06 Mar 2019 13:48) (125/58 - 168/86)  BP(mean): --  RR: 17 (06 Mar 2019 13:48) (17 - 18)  SpO2: 96% (06 Mar 2019 13:48) (96% - 98%)    Physical Exam:  General: obese, nervous   HEENT: EOMI, MMM  CVS: +S1/S2, RRR,   Resp: CTA b/l  GI: Soft, NT/ND  MSK: no synovitis. diffuse tender points, most prominent in upper torso  Neuro: AAOx3  Skin: no  rashes    LABS:                        13.2   9.28  )-----------( 237      ( 05 Mar 2019 09:35 )             40.2     03-05    140  |  105  |  26<H>  ----------------------------<  212<H>  4.5   |  20<L>  |  0.95    Ca    9.6      05 Mar 2019 07:05              RADIOLOGY & ADDITIONAL TESTS:      < from: MR Pelvis Bony Only w/wo IV Cont (03.05.19 @ 21:23) >  EXAM:  MR PELVIS BONY ONLY WAW IC                            PROCEDURE DATE:  03/05/2019            INTERPRETATION:  Clinical Information: Worsening left hip pain, elevated   Ramos reduced mobility. Uterine cancer.    Comparison: Pelvic radiographfrom 3/3/2019.    Technique: MRI of the pelvis was performed utilizing multiplanar imaging   before and after the initiation of 10 cc of Gadavist contrast. 0 cc were   discarded.    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 There is disc height loss at   the lower lumbar spine. The femoral heads are normal in signal. There is   no hip joint effusion. The solomon are not well evaluated on this pelvis   study although there could be anterior labral tearing bilaterally which   is of unlikely clinical significance.    The tendon insertions and origins are intact. There is minimal   hyperintense T2 signal anterior adjacent to the right gluteus maximums   tendon at the level the greater trochanter which does not enhance and is   of unlikely clinical significance.     The sacroiliac joints appear unremarkable. There is mild right greater   left lateral subcutaneous edema. The sciatic nerves appear unremarkable.    Impression: Moderate pubic symphysis arthrosis. Disc height loss atthe   lower lumbar spine. No left hip joint effusion, acute fracture or   metastatic lesion.    < end of copied text >

## 2019-03-06 NOTE — DISCHARGE NOTE PROVIDER - NSDCFUADDAPPT_GEN_ALL_CORE_FT
Follow up with PMD in 1 week  Follow up with [facility], [address], [place], [place], [address]; Call [phone] for appointment

## 2019-03-06 NOTE — PROGRESS NOTE ADULT - SUBJECTIVE AND OBJECTIVE BOX
Patient is a 74y old  Female who presents with a chief complaint of Unable to ambulate (06 Mar 2019 18:03)        SUBJECTIVE / OVERNIGHT EVENTS: Patient reports that her left hip feels a little better today. She says her pain is a little better. She denies CP or SOB or N/V.       MEDICATIONS  (STANDING):  aspirin enteric coated 81 milliGRAM(s) Oral daily  atorvastatin 20 milliGRAM(s) Oral at bedtime  cyanocobalamin 1000 MICROGram(s) Oral daily  dextrose 5%. 1000 milliLiter(s) (50 mL/Hr) IV Continuous <Continuous>  dextrose 5%. 1000 milliLiter(s) (50 mL/Hr) IV Continuous <Continuous>  dextrose 50% Injectable 12.5 Gram(s) IV Push once  dextrose 50% Injectable 25 Gram(s) IV Push once  dextrose 50% Injectable 25 Gram(s) IV Push once  dextrose 50% Injectable 12.5 Gram(s) IV Push once  dextrose 50% Injectable 25 Gram(s) IV Push once  dextrose 50% Injectable 25 Gram(s) IV Push once  gabapentin 100 milliGRAM(s) Oral three times a day  heparin  Injectable 5000 Unit(s) SubCutaneous every 8 hours  hydrochlorothiazide 12.5 milliGRAM(s) Oral daily  insulin glargine Injectable (LANTUS) 10 Unit(s) SubCutaneous at bedtime  insulin lispro (HumaLOG) corrective regimen sliding scale   SubCutaneous at bedtime  insulin lispro (HumaLOG) corrective regimen sliding scale   SubCutaneous three times a day with meals  insulin lispro Injectable (HumaLOG) 6 Unit(s) SubCutaneous before dinner  insulin lispro Injectable (HumaLOG) 6 Unit(s) SubCutaneous before breakfast  insulin lispro Injectable (HumaLOG) 6 Unit(s) SubCutaneous before lunch  latanoprost 0.005% Ophthalmic Solution 1 Drop(s) Both EYES at bedtime    MEDICATIONS  (PRN):  acetaminophen   Tablet .. 650 milliGRAM(s) Oral every 6 hours PRN Moderate Pain (4 - 6)  dextrose 40% Gel 15 Gram(s) Oral once PRN Blood Glucose LESS THAN 70 milliGRAM(s)/deciliter  dextrose 40% Gel 15 Gram(s) Oral once PRN Blood Glucose LESS THAN 70 milliGRAM(s)/deciliter  glucagon  Injectable 1 milliGRAM(s) IntraMuscular once PRN Glucose LESS THAN 70 milligrams/deciliter  glucagon  Injectable 1 milliGRAM(s) IntraMuscular once PRN Glucose LESS THAN 70 milligrams/deciliter      Vital Signs Last 24 Hrs  T(C): 37 (06 Mar 2019 21:04), Max: 37.1 (06 Mar 2019 13:48)  T(F): 98.6 (06 Mar 2019 21:04), Max: 98.8 (06 Mar 2019 13:48)  HR: 89 (06 Mar 2019 21:04) (77 - 96)  BP: 149/86 (06 Mar 2019 21:04) (147/74 - 168/86)  BP(mean): --  RR: 18 (06 Mar 2019 21:04) (17 - 18)  SpO2: 96% (06 Mar 2019 21:04) (96% - 97%)  CAPILLARY BLOOD GLUCOSE  262 (06 Mar 2019 12:53)  194 (06 Mar 2019 09:00)      POCT Blood Glucose.: 173 mg/dL (06 Mar 2019 21:29)  POCT Blood Glucose.: 278 mg/dL (06 Mar 2019 18:26)  POCT Blood Glucose.: 226 mg/dL (06 Mar 2019 09:57)    I&O's Summary    05 Mar 2019 07:01  -  06 Mar 2019 07:00  --------------------------------------------------------  IN: 840 mL / OUT: 800 mL / NET: 40 mL    06 Mar 2019 07:01  -  06 Mar 2019 23:20  --------------------------------------------------------  IN: 720 mL / OUT: 650 mL / NET: 70 mL          PHYSICAL EXAM:   GENERAL: NAD, well-developed  HEAD:  Atraumatic, Normocephalic  EYES: Conjunctiva and sclera clear  NECK: Supple  CHEST/LUNG: Clear to auscultation bilaterally; No wheeze  HEART: S1S2 normal. Regular rate and rhythm; No murmurs, rubs, or gallops  ABDOMEN: Soft, obese, Nontender, Nondistended; Bowel sounds present  EXTREMITIES: 1-2+ LE edema; reports pain in her LE's.   PSYCH/Neuro: AAOx3. 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 ?Neuropathic symptoms in LE's.   SKIN: No rashes or lesions      LABS:                        13.2   9.28  )-----------( 237      ( 05 Mar 2019 09:35 )             40.2     03-05    140  |  105  |  26<H>  ----------------------------<  212<H>  4.5   |  20<L>  |  0.95    Ca    9.6      05 Mar 2019 07:05        < from: MR Pelvis Bony Only w/wo IV Cont (03.05.19 @ 21:23) >  Impression: Moderate pubic symphysis arthrosis. Disc height loss atthe   lower lumbar spine. No left hip joint effusion, acute fracture or   metastatic lesion.    < end of copied text >      RADIOLOGY & ADDITIONAL TESTS:    Imaging Personally Reviewed: MRI pelvis report.   Consultant(s) Notes Reviewed: Rheumatology, Endocrinology  Care Discussed with Consultants/Other Providers: Rheum fellow

## 2019-03-06 NOTE — DISCHARGE NOTE PROVIDER - PROVIDER TOKENS
PROVIDER:[TOKEN:[[id number]]] PROVIDER:[TOKEN:[[id number]]],PROVIDER:[TOKEN:[[id number]]]

## 2019-03-06 NOTE — DISCHARGE NOTE PROVIDER - CARE PROVIDER_API CALL
Diana Harper (DO)  Internal Medicine; Rheumatology  [address], [address]  [place] [address]  Phone: [phone]  Fax: [phone]  Follow Up Time: Diana Harper ()  Internal Medicine; Rheumatology  [address] [facility] [address] [address] [facility], [address]  [place] [address]  Phone: [phone]  Fax: [phone]  Follow Up Time:     Eliu Chin)  Internal Medicine  [address], [address]  [place] [address]  Phone: [phone]  Fax: [phone]  Follow Up Time:

## 2019-03-06 NOTE — DISCHARGE NOTE PROVIDER - NSDCHHHOMEBOUND_GEN_ALL_CORE
September 29, 2017        Garry Arana  [address] [id number]      Dear Mr. Arana,    Your kidney function, electrolyte levels and blood cell count done at your recent visit came back within normal limits.       Your glycohemoglobin- this looks at your average blood sugar over the past 2-3 months was just a squeak elevated - pretty close to where you have been recently.    Your triglycerides remain elevated, but are improved from the last 2 measures. The rest of the cholesterol panel is fine.     As we discussed at your visit, please do work on eating a healthy diet with a goal of some weight loss.  Your goal of 270 lb would be excellent!      If you have any questions or concerns please feel free to contact me at [phone].        Sincerely,      Sharon Villasenor NP    Internal Medicine Department  [facility]  [address] [place] [address] [phone]            
Fall risk

## 2019-03-06 NOTE — CONSULT NOTE ADULT - SUBJECTIVE AND OBJECTIVE BOX
HPI:  75 y/o F poor historian in regard to her diabetes. Thinks she has had Type 2 diabetes for about 5 years w/ neuropathy in her hands and possible right eye retinopathy as she thinks she received an injection in the past. Denies other microvascular complications. A1c 7.2% At home takes Janumet 100/1000mg daily. Not sure if she takes it once a day or twice. Also not sure if she misses doses of the medication. Does not check her glucose. No reported hypoglycemia or symptoms of hypoglycemia. Tries to monitor sweets. Drinks diet soda. + occasional juice, bread, pasta, rice. +polyuria and polydipsia. No nausea or vomiting. + SOB w/ exertion. Started on prednisone now with hyperglycemia.   Also hx of glaucoma,. uterine CA s/p hysterecotmy, and urinary incontinence who presents brought in by daughter for decreased functionality at home.  She has not been able to stand and walk as normal (usually uses a cane) and had a fall about week ago where she was on the floor for several hours before her daughter came home.  She is not able to provide much history and appears nervous.  Per chart review she has also been having some cognitive decline with memory and ADLs over the past several weeks to months as well.  She has been to outpatient physical therapy which helps when she gets deconditioned She is complaining of chronic low back pain and worsening left hip, leg and knee pain.  She has pain in both of her hips and legs, worse on the left and also complains of knee pain but has full ROM of all joints. She does not recall if she injured her hip in her fall.       PAST MEDICAL & SURGICAL HISTORY:  Urinary incontinence  Uterine cancer  Glaucoma  Type 2 diabetes mellitus without complication, without long-term current use of insulin  H/O total hysterectomy      FAMILY HISTORY:  No pertinent family history in first degree relatives      Social History: No tobacco or alcohol use    Outpatient Medications:  · 	travoprost 0.004% ophthalmic solution: 1 drop(s) to each affected eye once a day (in the evening), Last Dose Taken:    · 	brinzolamide-brimonidine 1%-0.2% ophthalmic suspension: 1 drop(s) to each affected eye 2 times a day, Last Dose Taken:    · 	Janumet  mg-1000 mg oral tablet, extended release: 1 tab(s) orally once a day (in the evening), Last Dose Taken:    · 	aspirin 81 mg oral tablet: 1 tab(s) orally once a day, Last Dose Taken:    · 	Simbrinza 1%- 0.2% ophthalmic suspension: 1 drop(s) to each affected eye 2 times a day, Last Dose Taken:    · 	Crestor 5 mg oral tablet: 1 tab(s) orally once a day (at bedtime), Last Dose Taken:        MEDICATIONS  (STANDING):  aspirin enteric coated 81 milliGRAM(s) Oral daily  atorvastatin 20 milliGRAM(s) Oral at bedtime  cyanocobalamin 1000 MICROGram(s) Oral daily  dextrose 5%. 1000 milliLiter(s) (50 mL/Hr) IV Continuous <Continuous>  dextrose 5%. 1000 milliLiter(s) (50 mL/Hr) IV Continuous <Continuous>  dextrose 50% Injectable 12.5 Gram(s) IV Push once  dextrose 50% Injectable 25 Gram(s) IV Push once  dextrose 50% Injectable 25 Gram(s) IV Push once  dextrose 50% Injectable 12.5 Gram(s) IV Push once  dextrose 50% Injectable 25 Gram(s) IV Push once  dextrose 50% Injectable 25 Gram(s) IV Push once  gabapentin 100 milliGRAM(s) Oral three times a day  heparin  Injectable 5000 Unit(s) SubCutaneous every 8 hours  insulin lispro (HumaLOG) corrective regimen sliding scale   SubCutaneous at bedtime  insulin lispro (HumaLOG) corrective regimen sliding scale   SubCutaneous three times a day with meals  insulin lispro Injectable (HumaLOG) 2 Unit(s) SubCutaneous three times a day with meals  latanoprost 0.005% Ophthalmic Solution 1 Drop(s) Both EYES at bedtime  predniSONE   Tablet 30 milliGRAM(s) Oral daily    MEDICATIONS  (PRN):  acetaminophen   Tablet .. 650 milliGRAM(s) Oral every 6 hours PRN Moderate Pain (4 - 6)  dextrose 40% Gel 15 Gram(s) Oral once PRN Blood Glucose LESS THAN 70 milliGRAM(s)/deciliter  dextrose 40% Gel 15 Gram(s) Oral once PRN Blood Glucose LESS THAN 70 milliGRAM(s)/deciliter  glucagon  Injectable 1 milliGRAM(s) IntraMuscular once PRN Glucose LESS THAN 70 milligrams/deciliter  glucagon  Injectable 1 milliGRAM(s) IntraMuscular once PRN Glucose LESS THAN 70 milligrams/deciliter      Allergies    penicillin (Unknown)    Intolerances      Review of Systems:  Constitutional: No fever, No change in weight  Eyes: +glaucoma  Neuro: No headache, +paresthesias in hands  HEENT: No throat pain  Cardiovascular: No chest pain  Respiratory: + SOB w/ exertion  GI: No nausea or vomiting  : + polyuria  Skin: no rash  Psych: no depression  Endocrine: + polydipsia, No heat or cold intolerance, rest as noted in HPI  Hem/lymph: + LE swelling    All other review of systems negative      PHYSICAL EXAM:  VITALS: T(C): 36.8 (03-06-19 @ 16:37)  T(F): 98.3 (03-06-19 @ 16:37), Max: 98.8 (03-06-19 @ 13:48)  HR: 82 (03-06-19 @ 16:37) (77 - 96)  BP: 156/87 (03-06-19 @ 16:37) (145/75 - 168/86)  RR:  (17 - 18)  SpO2:  (96% - 98%)  Wt(kg): --  GENERAL: NAD at this time  EYES: No proptosis, EOMI  HEENT:  Atraumatic, Normocephalic,   THYROID: Normal size, no palpable nodules  RESPIRATORY: Clear to auscultation bilaterally, full excursion, non-labored  CARDIOVASCULAR: Regular rhythm; No murmurs; + b/l LE peripheral edema  GI: Soft, nontender, non distended, normal bowel sounds  SKIN: Dry, intact,   MUSCULOSKELETAL: decreased strength in LE but improving  NEURO: follows commands  PSYCH: normal affect, normal mood  CUSHING'S SIGNS: no striae      POCT Blood Glucose.: 226 mg/dL (03-06-19 @ 09:57)  POCT Blood Glucose.: 165 mg/dL (03-05-19 @ 22:08)  POCT Blood Glucose.: 224 mg/dL (03-05-19 @ 18:36)  POCT Blood Glucose.: 276 mg/dL (03-05-19 @ 12:27)  POCT Blood Glucose.: 217 mg/dL (03-05-19 @ 07:48)  POCT Blood Glucose.: 289 mg/dL (03-04-19 @ 22:46)  POCT Blood Glucose.: 118 mg/dL (03-04-19 @ 17:35)  POCT Blood Glucose.: 143 mg/dL (03-04-19 @ 12:06)  POCT Blood Glucose.: 162 mg/dL (03-04-19 @ 08:38)  POCT Blood Glucose.: 136 mg/dL (03-03-19 @ 22:08)                              13.2   9.28  )-----------( 237      ( 05 Mar 2019 09:35 )             40.2       03-05    140  |  105  |  26<H>  ----------------------------<  212<H>  4.5   |  20<L>  |  0.95    EGFR if : 68  EGFR if non : 59<L>    Ca    9.6      03-05      Thyroid Function Tests:  03-03 @ 17:02 TSH 3.21 FreeT4 -- T3 -- Anti TPO -- Anti Thyroglobulin Ab -- TSI --      Hemoglobin A1C, Whole Blood: 7.2 % <H> [4.0 - 5.6] (03-04-19 @ 08:34)        Radiology:

## 2019-03-06 NOTE — CONSULT NOTE ADULT - ASSESSMENT
73 y/o F w/ uncontrolled Type 2 DM w/ hyperglycemia exacerbated by steroids.
74F hx of DM2, urinary incontinence presents w myalgias of shoulders, upper arms, thighs in setting of high ESR  -highly suspicious of PMR  -however, L hand tingling not explained by PMR, likely cervical radiculpathy?  -weakness likely decompensation from long period of immobility    Recommend  Can start patient on Pred 30mg  Please obtain Xray of cervical spine  c/w w PT, f/u recs  Will follow    Rafita Yin MD PGY4

## 2019-03-06 NOTE — DISCHARGE NOTE PROVIDER - CARE PROVIDERS DIRECT ADDRESSES
,[email or web address],DirectAddress_Unknown

## 2019-03-06 NOTE — CONSULT NOTE ADULT - PROBLEM SELECTOR RECOMMENDATION 2
Goal BP <140/90, currently above goal, continue to monitor. If persistent may need antihypertensive.

## 2019-03-06 NOTE — DISCHARGE NOTE PROVIDER - NSDCCPCAREPLAN_GEN_ALL_CORE_FT
PRINCIPAL DISCHARGE DIAGNOSIS  Problem: Hip pain  Assessment and Plan of Treatment: PRINCIPAL DISCHARGE DIAGNOSIS  Problem: Leg pain, bilateral  Assessment and Plan of Treatment: Continue medications as prescribed  Physical therapy      SECONDARY DISCHARGE DIAGNOSES  Problem: Memory loss  Assessment and Plan of Treatment: Your B12 levels were low.   Continue taking Vitamin B12 1000mcg daily for low B12.    Problem: Essential hypertension  Assessment and Plan of Treatment: Take medications for your blood pressure as recommended.  Eat a heart healthy diet that is low in saturated fats and salt, and includes whole grains, fruits, vegetables and lean protein   Exercise regularly (consult with your physician or cardiologist first); maintain a heart healthy weight.   If you smoke - quit (A resource to help you stop smoking is the [facility] – phone number [phone].). Continue to follow with your primary physician or cardiologist.   Seek medical help for dizziness, Lightheadedness, Blurry vision, Headache, Chest pain, Shortness of breath  Follow up with your medical doctor to establish long term blood pressure treatment goals.    Problem: Type 2 diabetes mellitus without complication, without long-term current use of insulin  Assessment and Plan of Treatment: HgA1C this admission - 7.2  Make sure you get your HgA1c checked every three months.  If you take oral diabetes medications, check your blood glucose two times a day.  If you take insulin, check your blood glucose before meals and at bedtime.  It's important not to skip any meals.  Keep a log of your blood glucose results and always take it with you to your doctor appointments.  Keep a list of your current medications including injectables and over the counter medications and bring this medication list with you to all your doctor appointments.  If you have not seen your ophthalmologist this year call for appointment.  Check your feet daily for redness, sores, or openings. Do not self treat. If no improvement in two days call your primary care physician for an appointment.  Low blood sugar (hypoglycemia) is a blood sugar below 70mg/dl. Check your blood sugar if you feel signs/symptoms of hypoglycemia. If your blood sugar is below 70 take 15 grams of carbohydrates (ex 4 oz of apple juice, 3-4 glucose tablets, or 4-6 oz of regular soda) wait 15 minutes and repeat blood sugar to make sure it comes up above 70.  If your blood sugar is above 70 and you are due for a meal, have a meal.  If you are not due for a meal have a snack.  This snack helps keeps your blood sugar at a safe range. PRINCIPAL DISCHARGE DIAGNOSIS  Problem: Leg pain, bilateral  Assessment and Plan of Treatment: Continue medications as prescribed  Physical therapy      SECONDARY DISCHARGE DIAGNOSES  Problem: Memory loss  Assessment and Plan of Treatment: Your B12 levels were low.   Follow up with PMD for B12 injections    Problem: Essential hypertension  Assessment and Plan of Treatment: Take medications for your blood pressure as recommended.  Eat a heart healthy diet that is low in saturated fats and salt, and includes whole grains, fruits, vegetables and lean protein   Exercise regularly (consult with your physician or cardiologist first); maintain a heart healthy weight.   If you smoke - quit (A resource to help you stop smoking is the [facility] Tobacco [facility] – phone number 265-202-2019.). Continue to follow with your primary physician or cardiologist.   Seek medical help for dizziness, Lightheadedness, Blurry vision, Headache, Chest pain, Shortness of breath  Follow up with your medical doctor to establish long term blood pressure treatment goals.    Problem: Type 2 diabetes mellitus without complication, without long-term current use of insulin  Assessment and Plan of Treatment: HgA1C this admission - 7.2  Make sure you get your HgA1c checked every three months.  If you take oral diabetes medications, check your blood glucose two times a day.  If you take insulin, check your blood glucose before meals and at bedtime.  It's important not to skip any meals.  Keep a log of your blood glucose results and always take it with you to your doctor appointments.  Keep a list of your current medications including injectables and over the counter medications and bring this medication list with you to all your doctor appointments.  If you have not seen your ophthalmologist this year call for appointment.  Check your feet daily for redness, sores, or openings. Do not self treat. If no improvement in two days call your primary care physician for an appointment.  Low blood sugar (hypoglycemia) is a blood sugar below 70mg/dl. Check your blood sugar if you feel signs/symptoms of hypoglycemia. If your blood sugar is below 70 take 15 grams of carbohydrates (ex 4 oz of apple juice, 3-4 glucose tablets, or 4-6 oz of regular soda) wait 15 minutes and repeat blood sugar to make sure it comes up above 70.  If your blood sugar is above 70 and you are due for a meal, have a meal.  If you are not due for a meal have a snack.  This snack helps keeps your blood sugar at a safe range. PRINCIPAL DISCHARGE DIAGNOSIS  Problem: Leg pain, bilateral  Assessment and Plan of Treatment: Continue medications as prescribed  Physical therapy      SECONDARY DISCHARGE DIAGNOSES  Problem: Essential hypertension  Assessment and Plan of Treatment: Take medications for your blood pressure as recommended.  Eat a heart healthy diet that is low in saturated fats and salt, and includes whole grains, fruits, vegetables and lean protein   Exercise regularly (consult with your physician or cardiologist first); maintain a heart healthy weight.   If you smoke - quit (A resource to help you stop smoking is the [facility] – phone number [phone].). Continue to follow with your primary physician or cardiologist.   Seek medical help for dizziness, Lightheadedness, Blurry vision, Headache, Chest pain, Shortness of breath  Follow up with your medical doctor to establish long term blood pressure treatment goals.    Problem: Type 2 diabetes mellitus without complication, without long-term current use of insulin  Assessment and Plan of Treatment: HgA1C this admission - 7.2  Make sure you get your HgA1c checked every three months.  If you take oral diabetes medications, check your blood glucose two times a day.  If you take insulin, check your blood glucose before meals and at bedtime.  It's important not to skip any meals.  Keep a log of your blood glucose results and always take it with you to your doctor appointments.  Keep a list of your current medications including injectables and over the counter medications and bring this medication list with you to all your doctor appointments.  If you have not seen your ophthalmologist this year call for appointment.  Check your feet daily for redness, sores, or openings. Do not self treat. If no improvement in two days call your primary care physician for an appointment.  Low blood sugar (hypoglycemia) is a blood sugar below 70mg/dl. Check your blood sugar if you feel signs/symptoms of hypoglycemia. If your blood sugar is below 70 take 15 grams of carbohydrates (ex 4 oz of apple juice, 3-4 glucose tablets, or 4-6 oz of regular soda) wait 15 minutes and repeat blood sugar to make sure it comes up above 70.  If your blood sugar is above 70 and you are due for a meal, have a meal.  If you are not due for a meal have a snack.  This snack helps keeps your blood sugar at a safe range.    Problem: Pernicious anemia  Assessment and Plan of Treatment: Your B12 levels were low.   Continue Vitamin B12 1000mcg daily for low B12  Follow up with PMD for B12 injections

## 2019-03-06 NOTE — PROGRESS NOTE ADULT - ASSESSMENT
74F hx of DM2, urinary incontinence presents w myalgias of shoulders, upper arms, thighs in setting of high ESR  -given minimal improvement in upper arm, thigh pain, PMR dx becomes more questionable  -MRI shows some evidence of arthrosis      Recommend  can d/c on pred 20mg and pt can f/u w [facility] rheumatology, [address] [place] [address], [id number]; [phone]    FULL CONSULT TO COME    Rafita Yin MD PGY4 74F hx of DM2, urinary incontinence presents diffuse tender points  -given minimal improvement in upper arm, thigh pain, PMR dx becomes more questionable  -pt has diffuse tender points, which makes fibromyalgia more probable  -pt is obese, which can explain some of the ESR elevation  -MRI shows some evidence of arthrosis      Recommend  -can d/c steroids as pt not benefitting and becoming hyperglycemic  -c/w gabapentin        Rafita Yin MD PGY4

## 2019-03-07 ENCOUNTER — TRANSCRIPTION ENCOUNTER (OUTPATIENT)
Age: 74
End: 2019-03-07

## 2019-03-07 VITALS — DIASTOLIC BLOOD PRESSURE: 64 MMHG | SYSTOLIC BLOOD PRESSURE: 139 MMHG | HEART RATE: 86 BPM

## 2019-03-07 LAB
ANION GAP SERPL CALC-SCNC: 10 MMOL/L — SIGNIFICANT CHANGE UP (ref 5–17)
BUN SERPL-MCNC: 34 MG/DL — HIGH (ref 7–23)
CALCIUM SERPL-MCNC: 9.4 MG/DL — SIGNIFICANT CHANGE UP (ref 8.4–10.5)
CHLORIDE SERPL-SCNC: 103 MMOL/L — SIGNIFICANT CHANGE UP (ref 96–108)
CO2 SERPL-SCNC: 23 MMOL/L — SIGNIFICANT CHANGE UP (ref 22–31)
CREAT SERPL-MCNC: 0.99 MG/DL — SIGNIFICANT CHANGE UP (ref 0.5–1.3)
GLUCOSE SERPL-MCNC: 152 MG/DL — HIGH (ref 70–99)
PCA AB SER-ACNC: SIGNIFICANT CHANGE UP
POTASSIUM SERPL-MCNC: 4.2 MMOL/L — SIGNIFICANT CHANGE UP (ref 3.5–5.3)
POTASSIUM SERPL-SCNC: 4.2 MMOL/L — SIGNIFICANT CHANGE UP (ref 3.5–5.3)
SODIUM SERPL-SCNC: 136 MMOL/L — SIGNIFICANT CHANGE UP (ref 135–145)

## 2019-03-07 PROCEDURE — 99239 HOSP IP/OBS DSCHRG MGMT >30: CPT

## 2019-03-07 PROCEDURE — 85730 THROMBOPLASTIN TIME PARTIAL: CPT

## 2019-03-07 PROCEDURE — 83880 ASSAY OF NATRIURETIC PEPTIDE: CPT

## 2019-03-07 PROCEDURE — 85610 PROTHROMBIN TIME: CPT

## 2019-03-07 PROCEDURE — 83036 HEMOGLOBIN GLYCOSYLATED A1C: CPT

## 2019-03-07 PROCEDURE — 97161 PT EVAL LOW COMPLEX 20 MIN: CPT

## 2019-03-07 PROCEDURE — 86255 FLUORESCENT ANTIBODY SCREEN: CPT

## 2019-03-07 PROCEDURE — 85027 COMPLETE CBC AUTOMATED: CPT

## 2019-03-07 PROCEDURE — 82803 BLOOD GASES ANY COMBINATION: CPT

## 2019-03-07 PROCEDURE — 72170 X-RAY EXAM OF PELVIS: CPT

## 2019-03-07 PROCEDURE — 71045 X-RAY EXAM CHEST 1 VIEW: CPT

## 2019-03-07 PROCEDURE — 83605 ASSAY OF LACTIC ACID: CPT

## 2019-03-07 PROCEDURE — 82746 ASSAY OF FOLIC ACID SERUM: CPT

## 2019-03-07 PROCEDURE — 72197 MRI PELVIS W/O & W/DYE: CPT

## 2019-03-07 PROCEDURE — 82607 VITAMIN B-12: CPT

## 2019-03-07 PROCEDURE — 86140 C-REACTIVE PROTEIN: CPT

## 2019-03-07 PROCEDURE — 80048 BASIC METABOLIC PNL TOTAL CA: CPT

## 2019-03-07 PROCEDURE — A9585: CPT

## 2019-03-07 PROCEDURE — 99285 EMERGENCY DEPT VISIT HI MDM: CPT

## 2019-03-07 PROCEDURE — 86780 TREPONEMA PALLIDUM: CPT

## 2019-03-07 PROCEDURE — 93005 ELECTROCARDIOGRAM TRACING: CPT

## 2019-03-07 PROCEDURE — 82962 GLUCOSE BLOOD TEST: CPT

## 2019-03-07 PROCEDURE — 80053 COMPREHEN METABOLIC PANEL: CPT

## 2019-03-07 PROCEDURE — 86340 INTRINSIC FACTOR ANTIBODY: CPT

## 2019-03-07 PROCEDURE — 85652 RBC SED RATE AUTOMATED: CPT

## 2019-03-07 PROCEDURE — 84443 ASSAY THYROID STIM HORMONE: CPT

## 2019-03-07 PROCEDURE — 72040 X-RAY EXAM NECK SPINE 2-3 VW: CPT

## 2019-03-07 PROCEDURE — 87086 URINE CULTURE/COLONY COUNT: CPT

## 2019-03-07 PROCEDURE — 82550 ASSAY OF CK (CPK): CPT

## 2019-03-07 PROCEDURE — 81001 URINALYSIS AUTO W/SCOPE: CPT

## 2019-03-07 RX ORDER — PREGABALIN 225 MG/1
1000 CAPSULE ORAL ONCE
Qty: 0 | Refills: 0 | Status: COMPLETED | OUTPATIENT
Start: 2019-03-07 | End: 2019-03-07

## 2019-03-07 RX ORDER — GABAPENTIN 400 MG/1
1 CAPSULE ORAL
Qty: 0 | Refills: 0 | COMMUNITY
Start: 2019-03-07

## 2019-03-07 RX ORDER — PREGABALIN 225 MG/1
1 CAPSULE ORAL
Qty: 30 | Refills: 1
Start: 2019-03-07 | End: 2019-05-05

## 2019-03-07 RX ORDER — GABAPENTIN 400 MG/1
1 CAPSULE ORAL
Qty: 90 | Refills: 0
Start: 2019-03-07 | End: 2019-04-05

## 2019-03-07 RX ORDER — PREGABALIN 225 MG/1
1 CAPSULE ORAL
Qty: 0 | Refills: 0 | DISCHARGE
Start: 2019-03-07

## 2019-03-07 RX ORDER — ACETAMINOPHEN 500 MG
2 TABLET ORAL
Qty: 0 | Refills: 0 | DISCHARGE
Start: 2019-03-07

## 2019-03-07 RX ORDER — HYDROCHLOROTHIAZIDE 25 MG
1 TABLET ORAL
Qty: 30 | Refills: 0
Start: 2019-03-07 | End: 2019-04-05

## 2019-03-07 RX ADMIN — Medication 6 UNIT(S): at 08:02

## 2019-03-07 RX ADMIN — Medication 2: at 08:02

## 2019-03-07 RX ADMIN — PREGABALIN 1000 MICROGRAM(S): 225 CAPSULE ORAL at 12:47

## 2019-03-07 RX ADMIN — Medication 6 UNIT(S): at 13:31

## 2019-03-07 RX ADMIN — Medication 12.5 MILLIGRAM(S): at 05:59

## 2019-03-07 RX ADMIN — Medication 6 UNIT(S): at 17:30

## 2019-03-07 RX ADMIN — HEPARIN SODIUM 5000 UNIT(S): 5000 INJECTION INTRAVENOUS; SUBCUTANEOUS at 13:31

## 2019-03-07 RX ADMIN — GABAPENTIN 100 MILLIGRAM(S): 400 CAPSULE ORAL at 05:59

## 2019-03-07 RX ADMIN — Medication 81 MILLIGRAM(S): at 12:47

## 2019-03-07 RX ADMIN — HEPARIN SODIUM 5000 UNIT(S): 5000 INJECTION INTRAVENOUS; SUBCUTANEOUS at 05:59

## 2019-03-07 RX ADMIN — GABAPENTIN 100 MILLIGRAM(S): 400 CAPSULE ORAL at 13:32

## 2019-03-07 NOTE — PROGRESS NOTE ADULT - PROBLEM SELECTOR PLAN 5
-Patient's daughter needs to bring in Simbrinza and travoprost.  -For now c/w in house latanoprost.
-Patient's daughter needs to bring in home Simbrinza and travoprost, but can be resumed outpatient.  -For now c/w in house latanoprost.

## 2019-03-07 NOTE — DISCHARGE NOTE NURSING/CASE MANAGEMENT/SOCIAL WORK - NSDCDPATPORTLINK_GEN_ALL_CORE
You can access the [facility] Portal, offered by [facility], by registering with the following website: [email or web address]

## 2019-03-07 NOTE — PROGRESS NOTE ADULT - NSHPATTENDINGPLANDISCUSS_GEN_ALL_CORE
GMUE Coleman, bedside RN, and rheum fellow
GUME Harris, and rheum fellow
Patient, NP LANRE Harris, and Dr. Chin (PMD)
GUME Harris and rheum fellow

## 2019-03-07 NOTE — PROGRESS NOTE ADULT - REASON FOR ADMISSION
Unable to ambulate

## 2019-03-07 NOTE — PROGRESS NOTE ADULT - PROBLEM SELECTOR PLAN 2
-B12 low at 191, folate normal, TSH within normal, RPR negative, UA appeared positive but got UCx which grew normal andra, and VBG not consistent with CO2 retention or acidosis.   -C/w Vitamin B12 1000mcg daily for low B12.
-B12 low at 191, folate normal, TSH within normal, RPR negative, UA appeared positive but got UCx which grew normal andra, and VBG not consistent with CO2 retention or acidosis.   -C/w Vitamin B12 1000mcg daily for low B12.  -Intrinsic factor Ab returned positive, consistent with pernicious anemia, therefore patient may benefit from B12 injections as an outpatient. -Will give her a Vitamin B12 1000mcg IM injection today. -Discussed with patient and PMD to follow up outpatient.
-B12 low at 191, folate normal, TSH within normal, RPR negative, UA appeared positive but got UCx which grew normal andra, and VBG not consistent with CO2 retention or acidosis.   -C/w Vitamin B12 1000mcg daily for low B12.  -Intrinsic factor Ab returned positive, therefore patient may benefit from B12 injections as an outpatient.
-B12 low at 191, folate ordered, TSH within normal, RPR negative, UA appears positive will get UCx, and VBG not consistent with CO2 retention or acidosis.   -Started Vitamin B12 1000mcg daily for low B12.

## 2019-03-07 NOTE — PROGRESS NOTE ADULT - PROBLEM SELECTOR PLAN 3
-Patient with low CO2 on BMP, but VBG with normal pH.   -Lactate normal, BG not extremely elevated. Increased AG on admission returned to normal. No ketones in UA.   -Continue to monitor BMP.

## 2019-03-07 NOTE — DISCHARGE NOTE NURSING/CASE MANAGEMENT/SOCIAL WORK - NSDCFUADDAPPT_GEN_ALL_CORE_FT
Follow up with PMD in 1 week  Follow up with [facility], [address], [place], [place], [address]; Call [phone] for appointment

## 2019-03-07 NOTE — PROGRESS NOTE ADULT - PROBLEM SELECTOR PLAN 1
-Unlikely hip fracture since no fracture on X-ray, however she does report a ?fall about a week ago afterwards she was unable to get up.   -Neuropathy vs. ostearthritis vs. PMR? vs. other inflammatory process.   -HbA1c 7.2, ESR 82, and CK normal.   -Rheum recs appreciated; trial of prednisone 30mg daily for possible PMR. Unclear if patient is responding yet, may need more time.   -C/w Gabapentin will increase to 100mg TID and can increase further if tolerated. So far helping.   -PT eval recs home with home PT.   -MRI pelvis C+/C- pending to further evaluate. -Ativan premeds.  -Tylenol PRN.  -Orthostatics were negative.
-Unlikely hip fracture since no fracture on X-ray, however she does report a ?fall about a week ago afterwards she was unable to get up.   -Neuropathy vs. ostearthritis vs. PMR? vs. other inflammatory process.   -HbA1c 7.2, ESR 82, and CK normal, CRP normal.   -Rheum recs appreciated; s/p trial of prednisone 30mg daily for possible PMR. But seems like patient may not be responding or only minimally responding, which makes PMR less likely. Patient possibly has fibromyalgia. Will therefore DC prednisone.    -C/w Gabapentin at 100mg TID and can increase further if tolerated as an outpatient. So far helping.   -PT eval recs home with home PT.   -MRI pelvis C+/C- showed Moderate pubic symphysis arthrosis. Disc height loss at the lower lumbar spine. No left hip joint effusion, acute fracture or metastatic lesion.  -Tylenol PRN.  -Orthostatics were negative.
-Unlikely hip fracture since no fracture on X-ray, however she does report a ?fall about a week ago afterwards she was unable to get up.   -Neuropathy vs. ostearthritis vs. PMR? vs. other inflammatory process.   -HbA1c 7.2, ESR 82, and CK normal.   -Rheum consulted for elevated ESR and left hip pain with ?hip weakness.   -?Role for steroids.   -C/w Gabapentin 100mg BID and can increase if tolerated. So far helping.   -PT eval recs home with home PT.   -MRI pelvis C+/C- ordered to further evaluate. -Ativan premeds.  -Tylenol PRN.  -Check orthostatics in view of reported ?recent fall.
-Unlikely hip fracture since no fracture on X-ray, however she does report a ?fall about a week ago afterwards she was unable to get up. -MRI negative for any fracture.   -Neuropathy vs. ostearthritis vs. PMR? vs. other inflammatory process.   -HbA1c 7.2, ESR 82, and CK normal, CRP normal.   -Rheum recs appreciated; s/p trial of prednisone 30mg daily for possible PMR. But seems like patient may not have responded or only minimally responded, which makes PMR less likely. Patient possibly has fibromyalgia. Therefore DC'ed prednisone.    -C/w Gabapentin at 100mg TID and can increase further if tolerated as an outpatient. So far helping.   -PT eval recs home with home PT.   -MRI pelvis C+/C- showed Moderate pubic symphysis arthrosis. Disc height loss at the lower lumbar spine. No left hip joint effusion, acute fracture or metastatic lesion.  -Tylenol PRN.  -Orthostatics were negative.

## 2019-03-07 NOTE — DISCHARGE NOTE NURSING/CASE MANAGEMENT/SOCIAL WORK - NSDCPNDISPN_GEN_ALL_CORE
Activities of daily living, including home environment that might     exacerbate pain or reduce effectiveness of the pain management plan of care as well as strategies to address these issues/Education provided on the pain management plan of care/Side effects of pain management treatment

## 2019-03-07 NOTE — PROGRESS NOTE ADULT - PROBLEM SELECTOR PLAN 4
-HbA1c 7.2.  -C/w FLORENTIN.   -Added lispro 2 units with meals in view of some hyperglycemia caused by the prednisone. -Can adjust further PRN.   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 -C/w Gabapentin.   -C/w atorvastatin and ASA.  -C/w carbohydrate consistent diet.
-HbA1c 7.2.  -C/w FLORENTIN.   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 -C/w Gabapentin.   -C/w atorvastatin and ASA.  -C/w carbohydrate consistent diet.
-Hyperglycemia induced by prednisone, but should improve now that we are stopping the prednisone.   -HbA1c 7.2.  -C/w FLORENTIN.   -Endocrine recs appreciated for Lantus and premeal insulin and DC regimen plan.   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 -C/w Gabapentin.   -C/w atorvastatin and ASA.  -C/w carbohydrate consistent diet.  -Will start HCTZ 12.5mg daily for elevated BP and lower extremity edema. This can be adjusted as an outpatient by PMD.  -Nutrition recs appreciated.
-Hyperglycemia induced by prednisone, but should improve now that we have stopped the prednisone.   -HbA1c 7.2.  -C/w FLORENTIN.   -Endocrine recs appreciated for Lantus and premeal insulin and DC regimen plan.   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 -C/w Gabapentin.   -C/w atorvastatin and ASA.  -C/w carbohydrate consistent diet.  -C/w HCTZ 12.5mg daily for elevated BP and lower extremity edema. This can be adjusted as an outpatient by PMD.  -Nutrition recs appreciated.

## 2019-03-07 NOTE — PROGRESS NOTE ADULT - PROBLEM SELECTOR PLAN 6
-C/w heparin SQ for DVT PPx, however patient had been refusing, so ordered SCD's.  -PT eval recs home with home PT.
-C/w heparin SQ for DVT PPx, however patient had been refusing, so ordered SCD's.  -PT eval recs home with home PT.    7. Dispo: -Likely DC to home tomorrow.
-C/w heparin SQ for DVT PPx, however patient had been refusing, so ordered SCD's.  -PT eval recs home with home PT.    7. Dispo: -Patient stable for DC to home today with outpatient PMD, rheum, and endocrine follow up. -Advised patient to see her PMD (Dr. Chin) in a week of discharge and I gave him a sign out today over the phone. -36 minutes spent on the discharge process.
-C/w heparin SQ for DVT PPx, however patient has been refusing, so will order SCD's.  -PT eval recs home with home PT.

## 2019-03-07 NOTE — PROGRESS NOTE ADULT - SUBJECTIVE AND OBJECTIVE BOX
Patient is a 74y old  Female who presents with a chief complaint of Unable to ambulate (07 Mar 2019 12:21)        SUBJECTIVE / OVERNIGHT EVENTS: Patient reports her leg/left hip pain is a little better today. She denies CP or SOB.       MEDICATIONS  (STANDING):  aspirin enteric coated 81 milliGRAM(s) Oral daily  atorvastatin 20 milliGRAM(s) Oral at bedtime  cyanocobalamin 1000 MICROGram(s) Oral daily  dextrose 5%. 1000 milliLiter(s) (50 mL/Hr) IV Continuous <Continuous>  dextrose 5%. 1000 milliLiter(s) (50 mL/Hr) IV Continuous <Continuous>  dextrose 50% Injectable 12.5 Gram(s) IV Push once  dextrose 50% Injectable 25 Gram(s) IV Push once  dextrose 50% Injectable 25 Gram(s) IV Push once  dextrose 50% Injectable 12.5 Gram(s) IV Push once  dextrose 50% Injectable 25 Gram(s) IV Push once  dextrose 50% Injectable 25 Gram(s) IV Push once  gabapentin 100 milliGRAM(s) Oral three times a day  heparin  Injectable 5000 Unit(s) SubCutaneous every 8 hours  hydrochlorothiazide 12.5 milliGRAM(s) Oral daily  insulin glargine Injectable (LANTUS) 10 Unit(s) SubCutaneous at bedtime  insulin lispro (HumaLOG) corrective regimen sliding scale   SubCutaneous at bedtime  insulin lispro (HumaLOG) corrective regimen sliding scale   SubCutaneous three times a day with meals  insulin lispro Injectable (HumaLOG) 6 Unit(s) SubCutaneous before dinner  insulin lispro Injectable (HumaLOG) 6 Unit(s) SubCutaneous before breakfast  insulin lispro Injectable (HumaLOG) 6 Unit(s) SubCutaneous before lunch  latanoprost 0.005% Ophthalmic Solution 1 Drop(s) Both EYES at bedtime    MEDICATIONS  (PRN):  acetaminophen   Tablet .. 650 milliGRAM(s) Oral every 6 hours PRN Moderate Pain (4 - 6)  dextrose 40% Gel 15 Gram(s) Oral once PRN Blood Glucose LESS THAN 70 milliGRAM(s)/deciliter  dextrose 40% Gel 15 Gram(s) Oral once PRN Blood Glucose LESS THAN 70 milliGRAM(s)/deciliter  glucagon  Injectable 1 milliGRAM(s) IntraMuscular once PRN Glucose LESS THAN 70 milligrams/deciliter  glucagon  Injectable 1 milliGRAM(s) IntraMuscular once PRN Glucose LESS THAN 70 milligrams/deciliter      Vital Signs Last 24 Hrs  T(C): 37.2 (07 Mar 2019 13:58), Max: 37.2 (07 Mar 2019 13:58)  T(F): 98.9 (07 Mar 2019 13:58), Max: 98.9 (07 Mar 2019 13:58)  HR: 86 (07 Mar 2019 15:07) (79 - 93)  BP: 139/64 (07 Mar 2019 15:07) (139/64 - 170/78)  BP(mean): --  RR: 18 (07 Mar 2019 13:58) (18 - 18)  SpO2: 96% (07 Mar 2019 13:58) (95% - 96%)  CAPILLARY BLOOD GLUCOSE      POCT Blood Glucose.: 136 mg/dL (07 Mar 2019 13:27)  POCT Blood Glucose.: 152 mg/dL (07 Mar 2019 07:44)  POCT Blood Glucose.: 173 mg/dL (06 Mar 2019 21:29)  POCT Blood Glucose.: 278 mg/dL (06 Mar 2019 18:26)    I&O's Summary    06 Mar 2019 07:01  -  07 Mar 2019 07:00  --------------------------------------------------------  IN: 720 mL / OUT: 950 mL / NET: -230 mL    07 Mar 2019 07:01  -  07 Mar 2019 16:40  --------------------------------------------------------  IN: 380 mL / OUT: 150 mL / NET: 230 mL          PHYSICAL EXAM:   GENERAL: NAD, well-developed  HEAD:  Atraumatic, Normocephalic  EYES: Conjunctiva and sclera clear  NECK: Supple  CHEST/LUNG: Clear to auscultation bilaterally; No wheeze  HEART: S1S2 normal. Regular rate and rhythm; No murmurs, rubs, or gallops  ABDOMEN: Soft, obese, Nontender, Nondistended; Bowel sounds present  EXTREMITIES: 1-2+ LE edema; LE tenderness.   PSYCH/Neuro: Awake and answers questions and follows commands. Moves all extremities. Neuropathy symptoms in hands.   SKIN: No rashes or lesions      LABS:    03-07    136  |  103  |  34<H>  ----------------------------<  152<H>  4.2   |  23  |  0.99    Ca    9.4      07 Mar 2019 06:57          RADIOLOGY & ADDITIONAL TESTS:    Imaging Personally Reviewed:   Consultant(s) Notes Reviewed:  Rheum  Care Discussed with Consultants/Other Providers: Dr. Chin (PMD)

## 2019-03-07 NOTE — PROGRESS NOTE ADULT - ATTENDING COMMENTS
Marciano Mathias MD  Division of [facility] Medicine  Cell: [phone]  Pager: [phone]  Office: [phone]

## 2019-04-18 NOTE — CDI QUERY NOTE - NSCDIOTHERTXTBX_GEN_ALL_CORE_HH
Admitted with B LE pain. Documented as    Problem/Plan - 1:  ·  Problem: Leg pain, bilateral.  Plan: -Unlikely hip fracture since no fracture on X-ray, however she does report a ?fall about a week ago afterwards she was unable to get up. Pt. with uncontrolled DM, HgAIC>7.5,on Gabapentin   -Neuropathy vs. ostearthritis vs. PMR? vs. other inflammatory process.  Please clarify and document or link the B leg pain to the diagnosis/ses    > B leg pain likely due to DM neuropathy?  >B leg pain due to Osteoarthrosis?  >B leg pain due to fibromyalgia?  > B leg pain could be multifactorial likely from  DM neuropathy,osteorathritis and fibromyalgia?  >other or undetermined                        Thank You!

## 2019-05-13 ENCOUNTER — TRANSCRIPTION ENCOUNTER (OUTPATIENT)
Age: 74
End: 2019-05-13

## 2019-07-01 ENCOUNTER — EMERGENCY (EMERGENCY)
Facility: HOSPITAL | Age: 74
LOS: 1 days | Discharge: HOME | End: 2019-07-01
Attending: EMERGENCY MEDICINE
Payer: MEDICARE

## 2019-07-01 VITALS
HEART RATE: 70 BPM | SYSTOLIC BLOOD PRESSURE: 130 MMHG | DIASTOLIC BLOOD PRESSURE: 50 MMHG | OXYGEN SATURATION: 99 % | RESPIRATION RATE: 20 BRPM

## 2019-07-01 VITALS
WEIGHT: 250 LBS | RESPIRATION RATE: 20 BRPM | OXYGEN SATURATION: 99 % | TEMPERATURE: 98 F | SYSTOLIC BLOOD PRESSURE: 129 MMHG | HEART RATE: 74 BPM | HEIGHT: 64 IN | DIASTOLIC BLOOD PRESSURE: 58 MMHG

## 2019-07-01 DIAGNOSIS — Z90.710 ACQUIRED ABSENCE OF BOTH CERVIX AND UTERUS: Chronic | ICD-10-CM

## 2019-07-01 PROBLEM — H40.9 UNSPECIFIED GLAUCOMA: Chronic | Status: ACTIVE | Noted: 2019-03-03

## 2019-07-01 PROBLEM — C55 MALIGNANT NEOPLASM OF UTERUS, PART UNSPECIFIED: Chronic | Status: ACTIVE | Noted: 2019-03-03

## 2019-07-01 PROBLEM — R32 UNSPECIFIED URINARY INCONTINENCE: Chronic | Status: ACTIVE | Noted: 2019-03-03

## 2019-07-01 PROBLEM — E11.9 TYPE 2 DIABETES MELLITUS WITHOUT COMPLICATIONS: Chronic | Status: ACTIVE | Noted: 2019-03-03

## 2019-07-01 PROCEDURE — 99283 EMERGENCY DEPT VISIT LOW MDM: CPT

## 2019-07-01 PROCEDURE — 99283 EMERGENCY DEPT VISIT LOW MDM: CPT | Mod: GC

## 2019-07-01 RX ORDER — NYSTATIN CREAM 100000 [USP'U]/G
1 CREAM TOPICAL
Qty: 50 | Refills: 0
Start: 2019-07-01 | End: 2019-07-10

## 2019-07-01 RX ORDER — NYSTATIN CREAM 100000 [USP'U]/G
1 CREAM TOPICAL
Qty: 50 | Refills: 0
Start: 2019-07-01 | End: 2019-07-07

## 2019-07-01 NOTE — ED PROVIDER NOTE - ATTENDING CONTRIBUTION TO CARE
Patient with diabetes and dementia, presenting for evaluation of rash under pannus and in groin consistent with cutaneous candidal infection, plan for antifungals and PMD follow up.  Also has abscess on R posterior back, on exam significant fluctuance/edema without surrounding erythema, sponteanously unroofed and draining (but more expressable with palpation) - offered daughter I&D in Emergency Department, she declined.

## 2019-07-01 NOTE — ED PROVIDER NOTE - PHYSICAL EXAMINATION
Skin: Erythematous areas below breast folds and folds of abdomen, Draining abscess over back 2 x 2 cm

## 2019-07-01 NOTE — ED PROVIDER NOTE - NS ED ROS FT
CONSTITUTIONAL: No fevers, no chills  Cardiovascular: No Chest pain  Gastrointestinal: No n/v/d, no abd pain  Genitourinary: no dysuria, no hematuria  SKIN: refer to HPI  NEURO: no headache, no weakness or numbness

## 2019-07-01 NOTE — ED ADULT NURSE NOTE - OBJECTIVE STATEMENT
73 y/o F A&OX3 with PMH of urinary incontinence, uterine CA, glaucoma, DM, hysterectomy, presents to the ED c.o rash to groin, breasts and legs. Red rash noted between folds of skin. Antifungal cream was applied to areas prior to arrival to the ED. Pt. was brought in by daughter who noticed rash early this AM. Pt. does not have any other complaints at this time. Pt. endorses itchiness to rash. VSS/NAD. Denies fever, chills, dizziness, HA, n/v/d,  cough, SOB, and CP. Afebrile. Denies recent change in soaps or detergents. Safety and comfort provided. Family at bedside.

## 2019-07-01 NOTE — ED PROVIDER NOTE - CLINICAL SUMMARY MEDICAL DECISION MAKING FREE TEXT BOX
74 year old female with PMH of DM-2, dementia, glaucoma, uterine CA s/p hysterectomy, and urinary incontinence pw cc of rash. Candidal rash in skin folds, will prescribe additional anti-fungal cream and d/c w/ pcp F/U

## 2019-07-01 NOTE — ED PROVIDER NOTE - OBJECTIVE STATEMENT
74 year old female with PMH of DM-2, dementia, glaucoma, uterine CA s/p hysterectomy, and urinary incontinence pw cc of rash    Has red rash between folds of body, daughter first noticed this tonight and brought her mom to the ER. Already applying anti-fungal cream to the folds between the breasts and skin.   No F/C

## 2019-07-01 NOTE — ED PROVIDER NOTE - NSFOLLOWUPINSTRUCTIONS_ED_ALL_ED_FT
(1) Follow up with your primary care physician as discussed  (2) Immediately seek care at your nearest emergency room if your worsen, persist, or do not resolve   (3) Take Tylenol and/or motrin up to every 6 hours as needed for pain.   (4) Take the prescribed medication as instructed:  -Nystatin powder applied topicaly to the affected areas once a day  (5) Ensure the abscess continues draining with warm compressess

## 2019-10-30 ENCOUNTER — TRANSCRIPTION ENCOUNTER (OUTPATIENT)
Age: 74
End: 2019-10-30

## 2019-10-31 ENCOUNTER — OUTPATIENT (OUTPATIENT)
Dept: OUTPATIENT SERVICES | Facility: HOSPITAL | Age: 74
LOS: 1 days | End: 2019-10-31
Payer: MEDICARE

## 2019-10-31 VITALS
WEIGHT: 224.87 LBS | TEMPERATURE: 98 F | RESPIRATION RATE: 18 BRPM | SYSTOLIC BLOOD PRESSURE: 157 MMHG | HEIGHT: 63 IN | HEART RATE: 86 BPM | OXYGEN SATURATION: 99 % | DIASTOLIC BLOOD PRESSURE: 77 MMHG

## 2019-10-31 VITALS
DIASTOLIC BLOOD PRESSURE: 78 MMHG | SYSTOLIC BLOOD PRESSURE: 153 MMHG | HEART RATE: 86 BPM | OXYGEN SATURATION: 98 % | RESPIRATION RATE: 18 BRPM

## 2019-10-31 DIAGNOSIS — H40.51X4 GLAUCOMA SECONDARY TO OTHER EYE DISORDERS, RIGHT EYE, INDETERMINATE STAGE: ICD-10-CM

## 2019-10-31 DIAGNOSIS — Z90.710 ACQUIRED ABSENCE OF BOTH CERVIX AND UTERUS: Chronic | ICD-10-CM

## 2019-10-31 LAB — GLUCOSE BLDC GLUCOMTR-MCNC: 154 MG/DL — HIGH (ref 70–99)

## 2019-10-31 PROCEDURE — C1783: CPT

## 2019-10-31 PROCEDURE — C1762: CPT

## 2019-10-31 PROCEDURE — 82962 GLUCOSE BLOOD TEST: CPT

## 2019-10-31 PROCEDURE — 66180 AQUEOUS SHUNT EYE W/GRAFT: CPT | Mod: RT

## 2019-10-31 NOTE — ASU DISCHARGE PLAN (ADULT/PEDIATRIC) - CARE PROVIDER_API CALL
Elroy Stack (MD)  Ophthalmology  [address] [place] [address]  Phone: [phone]  Fax: [phone]  Follow Up Time:

## 2019-10-31 NOTE — ASU PATIENT PROFILE, ADULT - PMH
Chronic kidney disease    Glaucoma    Type 2 diabetes mellitus without complication, without long-term current use of insulin    Urinary incontinence    Uterine cancer Chronic kidney disease    Glaucoma    Impaired memory    Type 2 diabetes mellitus without complication, without long-term current use of insulin    Urinary incontinence    Uterine cancer

## 2019-10-31 NOTE — ASU DISCHARGE PLAN (ADULT/PEDIATRIC) - ASU DC SPECIAL INSTRUCTIONSFT
Your eye patch will remain on overnight. Your doctor will remove it at your appointment tomorrow and instruct you on what drops to take at that time. Continue drops as usual in the other eye. Bring the eye kit and all of your other eye drops to the office for each visit. You may experience some itching or scratchiness following surgery. This is normal and is usually relieved with Tylenol which can be taken 650mg by mouth every 4-6 hours for pain. Avoid Aspirin or Motrin. If you experience persistent decrease in vision, pain, excessive bleeding , temperature above 101, persistent nausea or vomiting contact your surgeon at [phone].

## 2019-10-31 NOTE — ASU DISCHARGE PLAN (ADULT/PEDIATRIC) - PATIENT EDUCATION MATERIALS PROVIED
Implant card (specify)/Intraocular lens implant (IOL) Eye shield with instructions , sunglasses and eye kit given to patient. Eye shield with instructions , sunglasses and eye kit given to patient. .

## 2021-09-15 NOTE — PATIENT PROFILE ADULT - NSPROREFERSVCHOMEGI_GEN_A_NUR
9/15/2021    [facility] [address]    RE: Burton Leosjuanjose       Dear Colleague,    I had the pleasure of seeing Burton Elizabeth in the [facility].    HPI and Plan:     Burton Elizabeth is a very pleasant 74-year-old comes in with his brother today.  He was recently discharged on 9/9/2021 from [facility] having knee replacement subsequent complications none including metabolic encephalopathy, congestive heart failure, non-ST segment elevation MI, acute renal insufficiency, and GI bleed.  He is currently residing at [facility] and his been recovering with stable hemoglobin in the 11-12 range.  His creatinine is improving from 3-1/2 down to 2-1/2 which is baseline is around 2.0.  He denies any chest pain.  His echocardiogram recently done shows now severe aortic valve stenosis preserved ejection fraction mean gradient gradient 40 mmHg and valve area approximately 0.7.    This is a complex patient with multiple medical issues recently recovering for a complex hospitalization with multiple comorbidities.  His STS score puts him at 5% intermediate risk however I think he is really high risk based on recent hospitalization course.  Given his non-ST elevation MI renal insufficiency and recent GI bleed I would like him to wait 4 to 6 weeks prior to aortic valve replacement.  I would strongly prefer catheter aortic valve replacement over open surgical.  Patient understands this.  We will need to proceed and I have just ordered a coronary angiogram as well as TAVR CT scan.  Risk and benefits were explained.  Patient wishes to proceed.  His recent echocardiographic images, lab work were all reviewed.     CURRENT MEDICATIONS:  Current Outpatient Medications   Medication Sig Dispense Refill     acetaminophen (TYLENOL) 325 MG tablet Take 325-650 mg by mouth every 4 hours as needed       apixaban  ANTICOAGULANT (ELIQUIS) 5 MG tablet Take 5 mg by mouth 2 times daily       atorvastatin (LIPITOR) 80 MG tablet Take 80 mg by mouth daily       carvedilol (COREG) 3.125 MG tablet Take 3.125 mg by mouth 2 times daily       glimepiride (AMARYL) 2 MG tablet Take 4 mg by mouth 2 times daily       HYDROcodone-acetaminophen (NORCO) 5-325 MG tablet Take 1-2 tablets by mouth as needed       hydrOXYzine (ATARAX) 10 MG tablet Take 1 tablet (10 mg) by mouth every 6 hours as needed for itching or anxiety (with pain, moderate pain) 30 tablet 0     insulin glargine (LANTUS VIAL) 100 UNIT/ML vial Inject 20 Units Subcutaneous       isosorbide mononitrate (IMDUR) 30 MG 24 hr tablet Take 30 mg by mouth daily       multivitamin w/minerals (MULTIVITAMIN, THERAPEUTIC WITH MINERALS) tablet Take 1 tablet by mouth daily       omeprazole (PRILOSEC) 40 MG DR capsule Take 40 capsules by mouth 2 times daily       polyethylene glycol (MIRALAX) 17 GM/Dose powder Take 17 g by mouth       semaglutide (OZEMPIC) 2 MG/1.5ML SOPN pen INJECT 0.5MG UNDER THE SKIN EVERY WEEK MULTIPLE DOSES PER PEN.       torsemide (DEMADEX) 20 MG tablet Take 20 mg by mouth daily       blood glucose (NO BRAND SPECIFIED) test strip Use to test blood sugar as directed       insulin glargine (LANTUS PEN) 100 UNIT/ML pen INJECT 20 UNITS UNDER THE SKIN EVERY DAY       nitroGLYcerin (NITROSTAT) 0.4 MG sublingual tablet For chest pain place 1 tablet under the tongue every 5 minutes for 3 doses. If symptoms persist 5 minutes after 1st dose call 911. 30 tablet 0     order for DME Equipment being ordered: Walker Wheels () and Walker ()  Treatment Diagnosis: Impaired gait 1 each 0     Semaglutide-Weight Management 0.5 MG/0.5ML SOAJ Inject 0.5 mg Subcutaneous       senna-docusate (SENOKOT-S/PERICOLACE) 8.6-50 MG tablet Take 1-2 tablets by mouth 2 times daily Take while on oral narcotics to prevent or treat constipation. 30 tablet 0       ALLERGIES     Allergies   Allergen  Reactions     Penicillins Anaphylaxis     Empagliflozin      Other reaction(s): Serum creatinine raised       PAST MEDICAL HISTORY:  Past Medical History:   Diagnosis Date     Arthritis of knee~ s/p replacement 3/24/2016     CAD (coronary artery disease)      Cardiomyopathy (H)      Cerebral infarction (H)      Congestive heart failure (H)      Decreased cardiac ejection fraction~44% 3/24/2016     Diabetes mellitus, type 2 (H) 3/24/2016     History of DVT (deep vein thrombosis)      HTN (hypertension) 3/24/2016     LBBB (left bundle branch block) 3/24/2016     Mixed hyperlipidemia 8/23/2016     Renal disease     stage 3 chronic kidney disease     Rheumatic aortic stenosis      Upper GI bleed        PAST SURGICAL HISTORY:  Past Surgical History:   Procedure Laterality Date     ANKLE SURGERY      X 4     APPENDECTOMY       ARTHROPLASTY KNEE Right 3/29/2016    Procedure: ARTHROPLASTY KNEE;  Surgeon: Heena Rosen MD;  Location:  OR     ARTHROPLASTY REVISION KNEE Left 9/27/2019    Procedure: REVISION LEFT TOTAL KNEE  ARTHROPLASTY;  Surgeon: Heena Rosen MD;  Location:  OR     ARTHROPLASTY REVISION KNEE Right 8/25/2021    Procedure: REVISION OF RIGHT TOTAL KNEE ARTHROPLASTY;  Surgeon: Heena Rosen MD;  Location:  OR     CV LEFT HEART CATH N/A 8/12/2019    Procedure: Left Heart Cath;  Surgeon: Edgardo Beckham MD;  Location:  HEART CARDIAC CATH LAB     ESOPHAGOSCOPY, GASTROSCOPY, DUODENOSCOPY (EGD), COMBINED N/A 7/7/2021    Procedure: ESOPHAGOGASTRODUODENOSCOPY, WITH BIOPSY;  Surgeon: Monserrat Brihgt MD;  Location:  GI     EYE SURGERY      cataract removal     ORTHOPEDIC SURGERY      KNEE SCOPES MULTIPLE       FAMILY HISTORY:  Family History   Problem Relation Age of Onset     Coronary Artery Disease No family hx of        SOCIAL HISTORY:  Social History     Socioeconomic History     Marital status: Single     Spouse name: None     Number of children: None     Years of education:  "None     Highest education level: None   Occupational History     None   Tobacco Use     Smoking status: Never Smoker     Smokeless tobacco: Never Used   Substance and Sexual Activity     Alcohol use: Not Currently     Alcohol/week: 2.0 standard drinks     Types: 2 Standard drinks or equivalent per week     Comment: occasional, social     Drug use: No     Sexual activity: None   Other Topics Concern     Parent/sibling w/ CABG, MI or angioplasty before 65F 55M? Not Asked   Social History Narrative     None     Social Determinants of Health     Financial Resource Strain:      Difficulty of Paying Living Expenses:    Food Insecurity:      Worried About Running Out of Food in the Last Year:      Ran Out of Food in the Last Year:    Transportation Needs:      Lack of Transportation (Medical):      Lack of Transportation (Non-Medical):    Physical Activity:      Days of Exercise per Week:      Minutes of Exercise per Session:    Stress:      Feeling of Stress :    Social Connections:      Frequency of Communication with Friends and Family:      Frequency of Social Gatherings with Friends and Family:      Attends Sikh Services:      Active Member of Clubs or Organizations:      Attends Club or Organization Meetings:      Marital Status:    Intimate Partner Violence:      Fear of Current or Ex-Partner:      Emotionally Abused:      Physically Abused:      Sexually Abused:        Review of Systems:  Skin:  Negative     Eyes:  Positive for glasses  ENT:  Negative    Respiratory:  Positive for dyspnea on exertion  Cardiovascular:    Positive for;edema  Gastroenterology: Positive for stomach or duodenal ulcer  Genitourinary:  not assessed    Musculoskeletal:  Positive for arthritis  Neurologic:  Negative    Psychiatric:  Negative    Heme/Lymph/Imm:  Negative    Endocrine:  Negative      Physical Exam:  Vitals: /76   Pulse 71   Ht 1.753 m (5' 9.02\")   Wt 112.9 kg (249 lb)   BMI 36.75 kg/m      Constitutional:      "      Skin:             Head:           Eyes:           Lymph:      ENT:           Neck:           Respiratory:            Cardiac:                                                           GI:           Extremities and Muscular Skeletal:                 Neurological:           Psych:         Recent Lab Results:  LIPID RESULTS:  Lab Results   Component Value Date    CHOL 97 01/11/2021    HDL 31 (L) 01/11/2021    LDL 46 01/11/2021    TRIG 102 01/11/2021       LIVER ENZYME RESULTS:  Lab Results   Component Value Date    AST 56 (H) 07/06/2021    ALT 34 07/06/2021       CBC RESULTS:  Lab Results   Component Value Date    WBC 9.0 07/11/2021    RBC 3.34 (L) 07/11/2021    HGB 11.4 (L) 08/26/2021    HGB 8.6 (L) 07/11/2021    HCT 29.2 (L) 07/11/2021    MCV 87 07/11/2021    MCH 25.7 (L) 07/11/2021    MCHC 29.5 (L) 07/11/2021    RDW 18.6 (H) 07/11/2021     07/11/2021       BMP RESULTS:  Lab Results   Component Value Date     08/26/2021     07/11/2021    POTASSIUM 4.1 08/26/2021    POTASSIUM 4.7 07/11/2021    CHLORIDE 104 08/26/2021    CHLORIDE 109 07/11/2021    CO2 25 08/26/2021    CO2 23 07/11/2021    ANIONGAP 7 08/26/2021    ANIONGAP 6 07/11/2021     (H) 08/26/2021     (H) 07/11/2021    BUN 49 (H) 08/26/2021    BUN 45 (H) 07/11/2021    CR 1.97 (H) 08/26/2021    CR 1.82 (H) 07/11/2021    GFRESTIMATED 32 (L) 08/26/2021    GFRESTIMATED 35 (L) 07/11/2021    GFRESTBLACK 41 (L) 07/11/2021    GABE 9.0 08/26/2021    GABE 8.9 07/11/2021        A1C RESULTS:  Lab Results   Component Value Date    A1C 6.3 (H) 07/06/2021       INR RESULTS:  Lab Results   Component Value Date    INR 1.32 (H) 07/06/2021    INR 1.1 (A) 06/30/2020           CC  No referring provider defined for this encounter.                    Thank you for allowing me to participate in the care of your patient.      Sincerely,     AURELIA PADILLA MD     [facility]  cc:   No referring  provider defined for this encounter.         no

## 2022-07-13 NOTE — ASU DISCHARGE PLAN (ADULT/PEDIATRIC) - CALL YOUR DOCTOR IF YOU HAVE ANY OF THE FOLLOWING:
Nausea and vomiting that does not stop/Swelling that gets worse/Fever greater than (need to indicate Fahrenheit or Celsius)/Wound/Surgical Site with redness, or foul smelling discharge or pus/Pain not relieved by Medications/Bleeding that does not stop
11-Jul-2022

## 2022-12-04 ENCOUNTER — INPATIENT (INPATIENT)
Facility: HOSPITAL | Age: 77
LOS: 4 days | Discharge: HOME | DRG: 603 | End: 2022-12-09
Attending: INTERNAL MEDICINE | Admitting: INTERNAL MEDICINE
Payer: MEDICARE

## 2022-12-04 VITALS
SYSTOLIC BLOOD PRESSURE: 163 MMHG | TEMPERATURE: 99 F | HEART RATE: 81 BPM | HEIGHT: 64 IN | WEIGHT: 199.96 LBS | DIASTOLIC BLOOD PRESSURE: 63 MMHG | RESPIRATION RATE: 18 BRPM | OXYGEN SATURATION: 97 %

## 2022-12-04 DIAGNOSIS — F99 MENTAL DISORDER, NOT OTHERWISE SPECIFIED: ICD-10-CM

## 2022-12-04 DIAGNOSIS — Z29.9 ENCOUNTER FOR PROPHYLACTIC MEASURES, UNSPECIFIED: ICD-10-CM

## 2022-12-04 DIAGNOSIS — Z90.49 ACQUIRED ABSENCE OF OTHER SPECIFIED PARTS OF DIGESTIVE TRACT: Chronic | ICD-10-CM

## 2022-12-04 DIAGNOSIS — J98.11 ATELECTASIS: ICD-10-CM

## 2022-12-04 DIAGNOSIS — L03.90 CELLULITIS, UNSPECIFIED: ICD-10-CM

## 2022-12-04 DIAGNOSIS — E87.70 FLUID OVERLOAD, UNSPECIFIED: ICD-10-CM

## 2022-12-04 DIAGNOSIS — E11.9 TYPE 2 DIABETES MELLITUS WITHOUT COMPLICATIONS: ICD-10-CM

## 2022-12-04 DIAGNOSIS — Z79.899 OTHER LONG TERM (CURRENT) DRUG THERAPY: ICD-10-CM

## 2022-12-04 DIAGNOSIS — I10 ESSENTIAL (PRIMARY) HYPERTENSION: ICD-10-CM

## 2022-12-04 DIAGNOSIS — R50.9 FEVER, UNSPECIFIED: ICD-10-CM

## 2022-12-04 DIAGNOSIS — E78.5 HYPERLIPIDEMIA, UNSPECIFIED: ICD-10-CM

## 2022-12-04 DIAGNOSIS — Z90.49 ACQUIRED ABSENCE OF OTHER SPECIFIED PARTS OF DIGESTIVE TRACT: ICD-10-CM

## 2022-12-04 LAB
ALBUMIN SERPL ELPH-MCNC: 2.4 G/DL — LOW (ref 3.3–5)
ALP SERPL-CCNC: 139 U/L — HIGH (ref 40–120)
ALT FLD-CCNC: 47 U/L — SIGNIFICANT CHANGE UP (ref 12–78)
ANION GAP SERPL CALC-SCNC: 7 MMOL/L — SIGNIFICANT CHANGE UP (ref 5–17)
APTT BLD: 27.7 SEC — SIGNIFICANT CHANGE UP (ref 27.5–35.5)
AST SERPL-CCNC: 17 U/L — SIGNIFICANT CHANGE UP (ref 15–37)
BASOPHILS # BLD AUTO: 0 K/UL — SIGNIFICANT CHANGE UP (ref 0–0.2)
BASOPHILS NFR BLD AUTO: 0 % — SIGNIFICANT CHANGE UP (ref 0–2)
BILIRUB SERPL-MCNC: 0.4 MG/DL — SIGNIFICANT CHANGE UP (ref 0.2–1.2)
BUN SERPL-MCNC: 17 MG/DL — SIGNIFICANT CHANGE UP (ref 7–23)
CALCIUM SERPL-MCNC: 8.7 MG/DL — SIGNIFICANT CHANGE UP (ref 8.5–10.1)
CHLORIDE SERPL-SCNC: 111 MMOL/L — HIGH (ref 96–108)
CO2 SERPL-SCNC: 24 MMOL/L — SIGNIFICANT CHANGE UP (ref 22–31)
CREAT SERPL-MCNC: 1.2 MG/DL — SIGNIFICANT CHANGE UP (ref 0.5–1.3)
EGFR: 46 ML/MIN/1.73M2 — LOW
EOSINOPHIL # BLD AUTO: 0.2 K/UL — SIGNIFICANT CHANGE UP (ref 0–0.5)
EOSINOPHIL NFR BLD AUTO: 2 % — SIGNIFICANT CHANGE UP (ref 0–6)
FLUAV AG NPH QL: SIGNIFICANT CHANGE UP
FLUBV AG NPH QL: SIGNIFICANT CHANGE UP
GLUCOSE SERPL-MCNC: 155 MG/DL — HIGH (ref 70–99)
HCT VFR BLD CALC: 37.3 % — SIGNIFICANT CHANGE UP (ref 34.5–45)
HGB BLD-MCNC: 12.2 G/DL — SIGNIFICANT CHANGE UP (ref 11.5–15.5)
INR BLD: 0.99 RATIO — SIGNIFICANT CHANGE UP (ref 0.88–1.16)
LACTATE SERPL-SCNC: 1.2 MMOL/L — SIGNIFICANT CHANGE UP (ref 0.7–2)
LIDOCAIN IGE QN: 260 U/L — SIGNIFICANT CHANGE UP (ref 73–393)
LYMPHOCYTES # BLD AUTO: 1.31 K/UL — SIGNIFICANT CHANGE UP (ref 1–3.3)
LYMPHOCYTES # BLD AUTO: 13 % — SIGNIFICANT CHANGE UP (ref 13–44)
MAGNESIUM SERPL-MCNC: 2.2 MG/DL — SIGNIFICANT CHANGE UP (ref 1.6–2.6)
MCHC RBC-ENTMCNC: 30.4 PG — SIGNIFICANT CHANGE UP (ref 27–34)
MCHC RBC-ENTMCNC: 32.7 GM/DL — SIGNIFICANT CHANGE UP (ref 32–36)
MCV RBC AUTO: 93 FL — SIGNIFICANT CHANGE UP (ref 80–100)
MONOCYTES # BLD AUTO: 0.81 K/UL — SIGNIFICANT CHANGE UP (ref 0–0.9)
MONOCYTES NFR BLD AUTO: 8 % — SIGNIFICANT CHANGE UP (ref 2–14)
NEUTROPHILS # BLD AUTO: 7.66 K/UL — HIGH (ref 1.8–7.4)
NEUTROPHILS NFR BLD AUTO: 76 % — SIGNIFICANT CHANGE UP (ref 43–77)
NRBC # BLD: SIGNIFICANT CHANGE UP /100 WBCS (ref 0–0)
NT-PROBNP SERPL-SCNC: 2037 PG/ML — HIGH (ref 0–450)
PLATELET # BLD AUTO: 292 K/UL — SIGNIFICANT CHANGE UP (ref 150–400)
POTASSIUM SERPL-MCNC: 4.4 MMOL/L — SIGNIFICANT CHANGE UP (ref 3.5–5.3)
POTASSIUM SERPL-SCNC: 4.4 MMOL/L — SIGNIFICANT CHANGE UP (ref 3.5–5.3)
PROCALCITONIN SERPL-MCNC: 0.25 NG/ML — HIGH
PROT SERPL-MCNC: 6.4 G/DL — SIGNIFICANT CHANGE UP (ref 6–8.3)
PROTHROM AB SERPL-ACNC: 11.6 SEC — SIGNIFICANT CHANGE UP (ref 10.5–13.4)
RBC # BLD: 4.01 M/UL — SIGNIFICANT CHANGE UP (ref 3.8–5.2)
RBC # FLD: 14 % — SIGNIFICANT CHANGE UP (ref 10.3–14.5)
RSV RNA NPH QL NAA+NON-PROBE: SIGNIFICANT CHANGE UP
SARS-COV-2 RNA SPEC QL NAA+PROBE: SIGNIFICANT CHANGE UP
SODIUM SERPL-SCNC: 142 MMOL/L — SIGNIFICANT CHANGE UP (ref 135–145)
TROPONIN I, HIGH SENSITIVITY RESULT: 13.9 NG/L — SIGNIFICANT CHANGE UP
WBC # BLD: 10.08 K/UL — SIGNIFICANT CHANGE UP (ref 3.8–10.5)
WBC # FLD AUTO: 10.08 K/UL — SIGNIFICANT CHANGE UP (ref 3.8–10.5)

## 2022-12-04 PROCEDURE — 99285 EMERGENCY DEPT VISIT HI MDM: CPT

## 2022-12-04 PROCEDURE — 93010 ELECTROCARDIOGRAM REPORT: CPT

## 2022-12-04 PROCEDURE — 71045 X-RAY EXAM CHEST 1 VIEW: CPT | Mod: 26

## 2022-12-04 PROCEDURE — 99223 1ST HOSP IP/OBS HIGH 75: CPT

## 2022-12-04 PROCEDURE — 93970 EXTREMITY STUDY: CPT | Mod: 26

## 2022-12-04 PROCEDURE — 99223 1ST HOSP IP/OBS HIGH 75: CPT | Mod: GC,AI

## 2022-12-04 PROCEDURE — 74177 CT ABD & PELVIS W/CONTRAST: CPT | Mod: 26,MA

## 2022-12-04 PROCEDURE — 71275 CT ANGIOGRAPHY CHEST: CPT | Mod: 26,MA

## 2022-12-04 RX ORDER — AZITHROMYCIN 500 MG/1
500 TABLET, FILM COATED ORAL ONCE
Refills: 0 | Status: COMPLETED | OUTPATIENT
Start: 2022-12-04 | End: 2022-12-04

## 2022-12-04 RX ORDER — INSULIN LISPRO 100/ML
VIAL (ML) SUBCUTANEOUS AT BEDTIME
Refills: 0 | Status: DISCONTINUED | OUTPATIENT
Start: 2022-12-04 | End: 2022-12-05

## 2022-12-04 RX ORDER — ONDANSETRON 8 MG/1
4 TABLET, FILM COATED ORAL EVERY 8 HOURS
Refills: 0 | Status: DISCONTINUED | OUTPATIENT
Start: 2022-12-04 | End: 2022-12-09

## 2022-12-04 RX ORDER — DEXTROSE 50 % IN WATER 50 %
25 SYRINGE (ML) INTRAVENOUS ONCE
Refills: 0 | Status: DISCONTINUED | OUTPATIENT
Start: 2022-12-04 | End: 2022-12-07

## 2022-12-04 RX ORDER — FUROSEMIDE 40 MG
40 TABLET ORAL DAILY
Refills: 0 | Status: DISCONTINUED | OUTPATIENT
Start: 2022-12-05 | End: 2022-12-08

## 2022-12-04 RX ORDER — LISINOPRIL 2.5 MG/1
20 TABLET ORAL DAILY
Refills: 0 | Status: DISCONTINUED | OUTPATIENT
Start: 2022-12-04 | End: 2022-12-09

## 2022-12-04 RX ORDER — DEXTROSE 50 % IN WATER 50 %
12.5 SYRINGE (ML) INTRAVENOUS ONCE
Refills: 0 | Status: DISCONTINUED | OUTPATIENT
Start: 2022-12-04 | End: 2022-12-07

## 2022-12-04 RX ORDER — AZITHROMYCIN 500 MG/1
500 TABLET, FILM COATED ORAL EVERY 24 HOURS
Refills: 0 | Status: DISCONTINUED | OUTPATIENT
Start: 2022-12-04 | End: 2022-12-05

## 2022-12-04 RX ORDER — ATORVASTATIN CALCIUM 80 MG/1
20 TABLET, FILM COATED ORAL AT BEDTIME
Refills: 0 | Status: DISCONTINUED | OUTPATIENT
Start: 2022-12-04 | End: 2022-12-09

## 2022-12-04 RX ORDER — METOPROLOL TARTRATE 50 MG
12.5 TABLET ORAL DAILY
Refills: 0 | Status: DISCONTINUED | OUTPATIENT
Start: 2022-12-04 | End: 2022-12-04

## 2022-12-04 RX ORDER — FUROSEMIDE 40 MG
40 TABLET ORAL ONCE
Refills: 0 | Status: COMPLETED | OUTPATIENT
Start: 2022-12-04 | End: 2022-12-04

## 2022-12-04 RX ORDER — CEFTRIAXONE 500 MG/1
1000 INJECTION, POWDER, FOR SOLUTION INTRAMUSCULAR; INTRAVENOUS ONCE
Refills: 0 | Status: COMPLETED | OUTPATIENT
Start: 2022-12-04 | End: 2022-12-04

## 2022-12-04 RX ORDER — TRAMADOL HYDROCHLORIDE 50 MG/1
50 TABLET ORAL ONCE
Refills: 0 | Status: DISCONTINUED | OUTPATIENT
Start: 2022-12-04 | End: 2022-12-09

## 2022-12-04 RX ORDER — METRONIDAZOLE 500 MG
500 TABLET ORAL EVERY 8 HOURS
Refills: 0 | Status: DISCONTINUED | OUTPATIENT
Start: 2022-12-04 | End: 2022-12-09

## 2022-12-04 RX ORDER — GLUCAGON INJECTION, SOLUTION 0.5 MG/.1ML
1 INJECTION, SOLUTION SUBCUTANEOUS ONCE
Refills: 0 | Status: DISCONTINUED | OUTPATIENT
Start: 2022-12-04 | End: 2022-12-07

## 2022-12-04 RX ORDER — ACETAMINOPHEN 500 MG
650 TABLET ORAL EVERY 6 HOURS
Refills: 0 | Status: DISCONTINUED | OUTPATIENT
Start: 2022-12-04 | End: 2022-12-09

## 2022-12-04 RX ORDER — MORPHINE SULFATE 50 MG/1
2 CAPSULE, EXTENDED RELEASE ORAL ONCE
Refills: 0 | Status: DISCONTINUED | OUTPATIENT
Start: 2022-12-04 | End: 2022-12-04

## 2022-12-04 RX ORDER — ENOXAPARIN SODIUM 100 MG/ML
40 INJECTION SUBCUTANEOUS EVERY 24 HOURS
Refills: 0 | Status: DISCONTINUED | OUTPATIENT
Start: 2022-12-04 | End: 2022-12-09

## 2022-12-04 RX ORDER — INSULIN LISPRO 100/ML
VIAL (ML) SUBCUTANEOUS
Refills: 0 | Status: DISCONTINUED | OUTPATIENT
Start: 2022-12-04 | End: 2022-12-05

## 2022-12-04 RX ORDER — SODIUM CHLORIDE 9 MG/ML
1000 INJECTION, SOLUTION INTRAVENOUS
Refills: 0 | Status: DISCONTINUED | OUTPATIENT
Start: 2022-12-04 | End: 2022-12-07

## 2022-12-04 RX ORDER — METOPROLOL TARTRATE 50 MG
12.5 TABLET ORAL DAILY
Refills: 0 | Status: DISCONTINUED | OUTPATIENT
Start: 2022-12-04 | End: 2022-12-09

## 2022-12-04 RX ORDER — CEFTRIAXONE 500 MG/1
1000 INJECTION, POWDER, FOR SOLUTION INTRAMUSCULAR; INTRAVENOUS EVERY 24 HOURS
Refills: 0 | Status: DISCONTINUED | OUTPATIENT
Start: 2022-12-04 | End: 2022-12-09

## 2022-12-04 RX ORDER — DEXTROSE 50 % IN WATER 50 %
15 SYRINGE (ML) INTRAVENOUS ONCE
Refills: 0 | Status: DISCONTINUED | OUTPATIENT
Start: 2022-12-04 | End: 2022-12-07

## 2022-12-04 RX ORDER — DULOXETINE HYDROCHLORIDE 30 MG/1
30 CAPSULE, DELAYED RELEASE ORAL DAILY
Refills: 0 | Status: DISCONTINUED | OUTPATIENT
Start: 2022-12-04 | End: 2022-12-09

## 2022-12-04 RX ADMIN — Medication 12.5 MILLIGRAM(S): at 18:48

## 2022-12-04 RX ADMIN — AZITHROMYCIN 255 MILLIGRAM(S): 500 TABLET, FILM COATED ORAL at 14:08

## 2022-12-04 RX ADMIN — MORPHINE SULFATE 2 MILLIGRAM(S): 50 CAPSULE, EXTENDED RELEASE ORAL at 19:48

## 2022-12-04 RX ADMIN — LISINOPRIL 20 MILLIGRAM(S): 2.5 TABLET ORAL at 18:48

## 2022-12-04 RX ADMIN — Medication 40 MILLIGRAM(S): at 09:29

## 2022-12-04 RX ADMIN — Medication 100 MILLIGRAM(S): at 18:35

## 2022-12-04 RX ADMIN — Medication 100 MILLIGRAM(S): at 22:02

## 2022-12-04 RX ADMIN — CEFTRIAXONE 1000 MILLIGRAM(S): 500 INJECTION, POWDER, FOR SOLUTION INTRAMUSCULAR; INTRAVENOUS at 13:57

## 2022-12-04 RX ADMIN — ATORVASTATIN CALCIUM 20 MILLIGRAM(S): 80 TABLET, FILM COATED ORAL at 22:02

## 2022-12-04 RX ADMIN — CEFTRIAXONE 100 MILLIGRAM(S): 500 INJECTION, POWDER, FOR SOLUTION INTRAMUSCULAR; INTRAVENOUS at 13:27

## 2022-12-04 RX ADMIN — MORPHINE SULFATE 2 MILLIGRAM(S): 50 CAPSULE, EXTENDED RELEASE ORAL at 20:18

## 2022-12-04 NOTE — H&P ADULT - PROBLEM SELECTOR PLAN 1
MUST CONTACT PATIENT'S DAUGHTER FOR FURTHER HISTORY AND PATIENT INFO - daughter CARLOS [phone]    S/p cholecystectomy 1 week ago, lower abdominal discomfort/pain  CT mild central biliary distention - possible choledocholithiasis s/p lap cholecystectomy?  NPO - pending GI recs  GI consult - f/u recs MUST CONTACT PATIENT'S DAUGHTER FOR FURTHER HISTORY AND PATIENT INFO - daughter CARLOS [phone]    S/p cholecystectomy 1 week ago, lower abdominal discomfort/pain  CT mild central biliary distention - possible choledocholithiasis s/p lap cholecystectomy?  NPO - pending GI recs  GI consult - f/u recs MUST CONTACT PATIENT'S DAUGHTER FOR FURTHER HISTORY AND PATIENT INFO - daughter CARLOS [phone]    S/p cholecystectomy 1 week ago, lower abdominal discomfort/pain  CT mild central biliary distention - possible choledocholithiasis s/p lap cholecystectomy?  NPO - pending GI recs  GI consult - f/u recs MUST CONTACT PATIENT'S DAUGHTER FOR FURTHER HISTORY AND PATIENT INFO - daughter CARLOS [phone]    S/p cholecystectomy 1 week ago, lower abdominal discomfort/pain  Alk phos 139, procal .25  CT mild central biliary distention - possible choledocholithiasis s/p lap cholecystectomy?  NPO - pending GI recs  GI consult - f/u recs MUST CONTACT PATIENT'S DAUGHTER FOR FURTHER HISTORY AND PATIENT INFO - daughter CARLOS [phone]    S/p cholecystectomy 1 week ago, lower abdominal discomfort/pain  Alk phos 139, procal .25  CT mild central biliary distention - possible choledocholithiasis s/p lap cholecystectomy?  NPO - pending GI recs  GI consult - f/u recs MUST CONTACT PATIENT'S DAUGHTER FOR FURTHER HISTORY AND PATIENT INFO - daughter CARLOS [phone]    S/p cholecystectomy 1 week ago, lower abdominal discomfort/pain  Alk phos 139, procal .25  CT mild central biliary distention - possible choledocholithiasis s/p lap cholecystectomy?  NPO - pending GI recs  GI consult - f/u recs MUST CONTACT PATIENT'S DAUGHTER FOR FURTHER HISTORY AND PATIENT INFO - daughter CARLOS [phone] [tried to contact daugter x2 and no answer, patient is limited historian]    S/p cholecystectomy 1 week ago, lower abdominal discomfort/pain  Alk phos 139, procal .25  CT mild central biliary distention - possible choledocholithiasis s/p lap cholecystectomy?  NPO - pending GI recs  GI consult, Dr Ford - f/u recs MUST CONTACT PATIENT'S DAUGHTER FOR FURTHER HISTORY AND PATIENT INFO - daughter CARLOS [phone] [tried to contact daugter x2 and no answer, patient is limited historian]    S/p cholecystectomy 1 week ago, lower abdominal discomfort/pain  Alk phos 139, procal .25  CT mild central biliary distention - possible choledocholithiasis s/p lap cholecystectomy?  NPO - pending GI recs  GI consult, Dr Ford - f/u recs MUST CONTACT PATIENT'S DAUGHTER FOR FURTHER HISTORY AND PATIENT INFO - daughter CARLOS [phone] [tried to contact daugter x2 and no answer, patient is limited historian]    S/p cholecystectomy 1 week ago, lower abdominal discomfort/pain  Alk phos 139, procal .25  CT mild central biliary distention - possible choledocholithiasis s/p lap cholecystectomy?  NPO - pending GI recs  GI consult, Dr Ford - f/u recs

## 2022-12-04 NOTE — ED ADULT TRIAGE NOTE - CHIEF COMPLAINT QUOTE
Patient A/Ox4 with clear speech. BIBA from home for fever. Had galbladder surgery 1 week ago. Denies ABD pain. Also has pedal edema BL. Has been off Lasix for 2 weeks due to prescription issue. Denies antipyretic PTA.

## 2022-12-04 NOTE — ED PROVIDER NOTE - CLINICAL SUMMARY MEDICAL DECISION MAKING FREE TEXT BOX
Patient is a 77-year-old female who presents to the emergency room with a chief complaints of fever in the setting of a recent cholecystectomy.  Past medical history of hypertension, hyperlipidemia, history of urinary incontinence, dementia, neuropathy, history of peripheral edema.  Patient is a poor historian secondary to her advanced dementia.  Patient's daughter reports that for the last she has been off of her Lasix.  Initially they were unable to follow-up with a primary care doctor and then patient developed acute gallstone pancreatitis and required admission at [facility].  During admission her Lasix was not resumed but the daughter is unsure why.  She also reports that the patient underwent a cholecystectomy during the admission.  He was discharged home on Wednesday and had initially been doing better but then Friday night she developed a cough and this morning she had a low-grade temperature of 100.1.  Daughter was concerned in the setting of her recent surgery.  She also noted that patient was complaining of some abdominal discomfort.  She further notes that patient's legs have been progressively more swollen and now developed some redness and increased warmth.  Denies any vomiting chest pain.  Daughter further reports that during her hospitalization course her metoprolol dose was decreased from 25 mg to 12.5 milligrams. Patient's presenting to the emergency room with a chief complaints of low-grade fevers cough and shortness of breath with some associated abdominal pain in the setting of a recent cholecystectomy.  Possible PE versus pneumonia versus atelectasis.  Was also consider possible intra-abdominal infection.  Will obtain screening septic work-up CT abdomen pelvis and CT chest.  We will begin antibiotics as needed.  For lower extremity swelling suspect more venous insufficiency lower suspicion for bacterial process as the swelling and redness is bilateral.  We will obtain venous Dopplers.  Results of labs and imaging reviewed patient with a normal white count electrolytes within normal limits mildly elevated BNP it is unclear at this time if pt has underlying CHF. Patient is a 77-year-old female who presents to the emergency room with a chief complaints of fever in the setting of a recent cholecystectomy.  Past medical history of hypertension, hyperlipidemia, history of urinary incontinence, dementia, neuropathy, history of peripheral edema.  Patient is a poor historian secondary to her advanced dementia.  Patient's daughter reports that for the last she has been off of her Lasix.  Initially they were unable to follow-up with a primary care doctor and then patient developed acute gallstone pancreatitis and required admission at [facility].  During admission her Lasix was not resumed but the daughter is unsure why.  She also reports that the patient underwent a cholecystectomy during the admission.  He was discharged home on Wednesday and had initially been doing better but then Friday night she developed a cough and this morning she had a low-grade temperature of 100.1.  Daughter was concerned in the setting of her recent surgery.  She also noted that patient was complaining of some abdominal discomfort.  She further notes that patient's legs have been progressively more swollen and now developed some redness and increased warmth.  Denies any vomiting chest pain.  Daughter further reports that during her hospitalization course her metoprolol dose was decreased from 25 mg to 12.5 milligrams. Patient's presenting to the emergency room with a chief complaints of low-grade fevers cough and shortness of breath with some associated abdominal pain in the setting of a recent cholecystectomy.  Possible PE versus pneumonia versus atelectasis.  Was also consider possible intra-abdominal infection.  Will obtain screening septic work-up CT abdomen pelvis and CT chest.  We will begin antibiotics as needed.  For lower extremity swelling suspect more venous insufficiency lower suspicion for bacterial process as the swelling and redness is bilateral.  We will obtain venous Dopplers.  Results of labs and imaging reviewed patient with a normal white count electrolytes within normal limits mildly elevated BNP it is unclear at this time if pt has underlying CHF. Patient is a 77-year-old female who presents to the emergency room with a chief complaints of fever in the setting of a recent cholecystectomy.  Past medical history of hypertension, hyperlipidemia, history of urinary incontinence, dementia, neuropathy, history of peripheral edema.  Patient is a poor historian secondary to her advanced dementia.  Patient's daughter reports that for the last she has been off of her Lasix.  Initially they were unable to follow-up with a primary care doctor and then patient developed acute gallstone pancreatitis and required admission at [facility].  During admission her Lasix was not resumed but the daughter is unsure why.  She also reports that the patient underwent a cholecystectomy during the admission.  He was discharged home on Wednesday and had initially been doing better but then Friday night she developed a cough and this morning she had a low-grade temperature of 100.1.  Daughter was concerned in the setting of her recent surgery.  She also noted that patient was complaining of some abdominal discomfort.  She further notes that patient's legs have been progressively more swollen and now developed some redness and increased warmth.  Denies any vomiting chest pain.  Daughter further reports that during her hospitalization course her metoprolol dose was decreased from 25 mg to 12.5 milligrams. Patient's presenting to the emergency room with a chief complaints of low-grade fevers cough and shortness of breath with some associated abdominal pain in the setting of a recent cholecystectomy.  Possible PE versus pneumonia versus atelectasis.  Was also consider possible intra-abdominal infection.  Will obtain screening septic work-up CT abdomen pelvis and CT chest.  We will begin antibiotics as needed.  For lower extremity swelling suspect more venous insufficiency lower suspicion for bacterial process as the swelling and redness is bilateral.  We will obtain venous Dopplers.  Results of labs and imaging reviewed patient with a normal white count electrolytes within normal limits mildly elevated BNP it is unclear at this time if pt has underlying CHF. Patient is a 77-year-old female who presents to the emergency room with a chief complaints of fever in the setting of a recent cholecystectomy.  Past medical history of hypertension, hyperlipidemia, history of urinary incontinence, dementia, neuropathy, history of peripheral edema.  Patient is a poor historian secondary to her advanced dementia.  Patient's daughter reports that for the last she has been off of her Lasix.  Initially they were unable to follow-up with a primary care doctor and then patient developed acute gallstone pancreatitis and required admission at [facility].  During admission her Lasix was not resumed but the daughter is unsure why.  She also reports that the patient underwent a cholecystectomy during the admission.  He was discharged home on Wednesday and had initially been doing better but then Friday night she developed a cough and this morning she had a low-grade temperature of 100.1.  Daughter was concerned in the setting of her recent surgery.  She also noted that patient was complaining of some abdominal discomfort.  She further notes that patient's legs have been progressively more swollen and now developed some redness and increased warmth.  Denies any vomiting chest pain.  Daughter further reports that during her hospitalization course her metoprolol dose was decreased from 25 mg to 12.5 milligrams. Patient's presenting to the emergency room with a chief complaints of low-grade fevers cough and shortness of breath with some associated abdominal pain in the setting of a recent cholecystectomy.  Possible PE versus pneumonia versus atelectasis.  Was also consider possible intra-abdominal infection.  Will obtain screening septic work-up CT abdomen pelvis and CT chest.  We will begin antibiotics as needed.  For lower extremity swelling suspect more venous insufficiency lower suspicion for bacterial process as the swelling and redness is bilateral.  We will obtain venous Dopplers.  Results of labs and imaging reviewed patient with a normal white count electrolytes within normal limits mildly elevated BNP it is unclear at this time if pt has underlying CHF.     Patient presenting to the emergency room with reported fever in the setting of recent cholecystectomy also with worsening peripheral edema in the setting of recently holding her Lasix although it is unclear why this was not restarted.  Patient now with a cough nonproductive.  Concern for infectious process was considered possible postoperative complication but given recent surgery pneumonia is also in the differential.  Will obtain screening septic work-up EKG will image chest abdomen pelvis.  Will obtain a CTA due to patient's recent surgery to exclude a possible PE we will begin antibiotics as needed and monitor.  For peripheral edema suspect this is related to patient stopping her Lasix.  Possible volume overload.  Will check BNP and venous Dopplers.  Labs noted no elevated white count electrolytes within normal patient with a mildly elevated procalcitonin and a BNP of over 2000.  Flu swab negative blood cultures sent CT imaging reveals cholecystectomy with no loculated fluid collection there is mild biliary dilation which could be related to postcholecystectomy status.  No PE there is a possible superimposed infection left lower lobe atelectasis.  Results reviewed with daughter secondary to patient's dementia and recent surgery would be a poor historian for close follow-up.  We will begin antibiotics diurese admit and monitor. Patient is a 77-year-old female who presents to the emergency room with a chief complaints of fever in the setting of a recent cholecystectomy.  Past medical history of hypertension, hyperlipidemia, history of urinary incontinence, dementia, neuropathy, history of peripheral edema.  Patient is a poor historian secondary to her advanced dementia.  Patient's daughter reports that for the last she has been off of her Lasix.  Initially they were unable to follow-up with a primary care doctor and then patient developed acute gallstone pancreatitis and required admission at [facility].  During admission her Lasix was not resumed but the daughter is unsure why.  She also reports that the patient underwent a cholecystectomy during the admission.  He was discharged home on Wednesday and had initially been doing better but then Friday night she developed a cough and this morning she had a low-grade temperature of 100.1.  Daughter was concerned in the setting of her recent surgery.  She also noted that patient was complaining of some abdominal discomfort.  She further notes that patient's legs have been progressively more swollen and now developed some redness and increased warmth.  Denies any vomiting chest pain.  Daughter further reports that during her hospitalization course her metoprolol dose was decreased from 25 mg to 12.5 milligrams. Patient's presenting to the emergency room with a chief complaints of low-grade fevers cough and shortness of breath with some associated abdominal pain in the setting of a recent cholecystectomy.  Possible PE versus pneumonia versus atelectasis.  Was also consider possible intra-abdominal infection.  Will obtain screening septic work-up CT abdomen pelvis and CT chest.  We will begin antibiotics as needed.  For lower extremity swelling suspect more venous insufficiency lower suspicion for bacterial process as the swelling and redness is bilateral.  We will obtain venous Dopplers.  Results of labs and imaging reviewed patient with a normal white count electrolytes within normal limits mildly elevated BNP it is unclear at this time if pt has underlying CHF.     Patient presenting to the emergency room with reported fever in the setting of recent cholecystectomy also with worsening peripheral edema in the setting of recently holding her Lasix although it is unclear why this was not restarted.  Patient now with a cough nonproductive.  Concern for infectious process was considered possible postoperative complication but given recent surgery pneumonia is also in the differential.  Will obtain screening septic work-up EKG will image chest abdomen pelvis.  Will obtain a CTA due to patient's recent surgery to exclude a possible PE we will begin antibiotics as needed and monitor.  For peripheral edema suspect this is related to patient stopping her Lasix.  Possible volume overload.  Will check BNP and venous Dopplers.  Labs noted no elevated white count electrolytes within normal patient with a mildly elevated procalcitonin and a BNP of over 2000.  Flu swab negative blood cultures sent CT imaging reveals cholecystectomy with no loculated fluid collection there is mild biliary dilation which could be related to postcholecystectomy status.  No PE there is a possible superimposed infection left lower lobe atelectasis.  Results reviewed with daughter secondary to patient's dementia and recent surgery would be a poor historian for close follow-up.  We will begin antibiotics diurese admit and monitor. Patient is a 77-year-old female who presents to the emergency room with a chief complaints of fever in the setting of a recent cholecystectomy.  Past medical history of hypertension, hyperlipidemia, history of urinary incontinence, dementia, neuropathy, history of peripheral edema.  Patient is a poor historian secondary to her advanced dementia.  Patient's daughter reports that for the last she has been off of her Lasix.  Initially they were unable to follow-up with a primary care doctor and then patient developed acute gallstone pancreatitis and required admission at [facility].  During admission her Lasix was not resumed but the daughter is unsure why.  She also reports that the patient underwent a cholecystectomy during the admission.  He was discharged home on Wednesday and had initially been doing better but then Friday night she developed a cough and this morning she had a low-grade temperature of 100.1.  Daughter was concerned in the setting of her recent surgery.  She also noted that patient was complaining of some abdominal discomfort.  She further notes that patient's legs have been progressively more swollen and now developed some redness and increased warmth.  Denies any vomiting chest pain.  Daughter further reports that during her hospitalization course her metoprolol dose was decreased from 25 mg to 12.5 milligrams. Patient's presenting to the emergency room with a chief complaints of low-grade fevers cough and shortness of breath with some associated abdominal pain in the setting of a recent cholecystectomy.  Possible PE versus pneumonia versus atelectasis.  Was also consider possible intra-abdominal infection.  Will obtain screening septic work-up CT abdomen pelvis and CT chest.  We will begin antibiotics as needed.  For lower extremity swelling suspect more venous insufficiency lower suspicion for bacterial process as the swelling and redness is bilateral.  We will obtain venous Dopplers.  Results of labs and imaging reviewed patient with a normal white count electrolytes within normal limits mildly elevated BNP it is unclear at this time if pt has underlying CHF.     Patient presenting to the emergency room with reported fever in the setting of recent cholecystectomy also with worsening peripheral edema in the setting of recently holding her Lasix although it is unclear why this was not restarted.  Patient now with a cough nonproductive.  Concern for infectious process was considered possible postoperative complication but given recent surgery pneumonia is also in the differential.  Will obtain screening septic work-up EKG will image chest abdomen pelvis.  Will obtain a CTA due to patient's recent surgery to exclude a possible PE we will begin antibiotics as needed and monitor.  For peripheral edema suspect this is related to patient stopping her Lasix.  Possible volume overload.  Will check BNP and venous Dopplers.  Labs noted no elevated white count electrolytes within normal patient with a mildly elevated procalcitonin and a BNP of over 2000.  Flu swab negative blood cultures sent CT imaging reveals cholecystectomy with no loculated fluid collection there is mild biliary dilation which could be related to postcholecystectomy status.  No PE there is a possible superimposed infection left lower lobe atelectasis.  Results reviewed with daughter secondary to patient's dementia and recent surgery would be a poor historian for close follow-up.  We will begin antibiotics diurese admit and monitor.

## 2022-12-04 NOTE — H&P ADULT - PROBLEM SELECTOR PLAN 6
Home rosuvastatin 5mg QD  Therapeutic interchange Home rosuvastatin 5mg QD  Therapeutic interchange  AM lipid panel Endorses hx of diabetes  Not on home insulin  AM A1C  Finger sticks, Consistent carb diet  Hypoglycemic protocol

## 2022-12-04 NOTE — ED PROVIDER NOTE - CARE PLAN
Principal Discharge DX:	Fever  Secondary Diagnosis:	Lung infiltrate  Secondary Diagnosis:	Peripheral edema   1

## 2022-12-04 NOTE — CONSULT NOTE ADULT - NS ATTEND AMEND GEN_ALL_CORE FT
pt is a very poor historian. now with sig lower ext edema. would give lasix 40mg Iv qday. Please continue to maintain strict I/Os, monitor daily weights, Cr, and K.   check echo.   no signs of ischemia  GI w/u per priamry.   Further cardiac workup will depend on clinical course. pt is a very poor historian. now with sig lower ext edema. would give lasix 40mg Iv qday. Please continue to maintain strict I/Os, monitor daily weights, Cr, and K.   check echo.   Repeat ekg. Likely sr. though cannot r/o st changes.   GI w/u per adriane.   Further cardiac workup will depend on clinical course.

## 2022-12-04 NOTE — H&P ADULT - NSHPSOCIALHISTORY_GEN_ALL_CORE
Limited 2/2 mental status.    Lives at home with daughter  ADLs:   Ambulates ____  Alcohol:  Tobacco:  Drugs:  Vaccine: Limited 2/2 mental status.  Lives at home with daughter  ADLs: Unknown  Ambulates minimally, per patient  Alcohol: None  Tobacco: None  Drugs: None  Vaccine: Unknown

## 2022-12-04 NOTE — ED PROVIDER NOTE - OBJECTIVE STATEMENT
Patient is a 77-year-old female who presents to the emergency room with a chief complaints of fever in the setting of a recent cholecystectomy.  Past medical history of hypertension, hyperlipidemia, history of urinary incontinence, dementia, neuropathy, history of peripheral edema.  Patient is a poor historian secondary to her advanced dementia.  Patient's daughter reports that for the last she has been off of her Lasix.  Initially they were unable to follow-up with a primary care doctor and then patient developed acute gallstone pancreatitis and required admission at [facility].  During admission her Lasix was not resumed but the daughter is unsure why.  She also reports that the patient underwent a cholecystectomy during the admission.  He was discharged home on Wednesday and had initially been doing better but then Friday night she developed a cough and this morning she had a low-grade temperature of 100.1.  Daughter was concerned in the setting of her recent surgery.  She also noted that patient was complaining of some abdominal discomfort.  She further notes that patient's legs have been progressively more swollen and now developed some redness and increased warmth.  Denies any vomiting chest pain.  Daughter further reports that during her hospitalization course her metoprolol dose was decreased from 25 mg to 12.5 milligrams. Patient is a 77-year-old female who presents to the emergency room with a chief complaints of fever in the setting of a recent cholecystectomy.  Past medical history of hypertension, hyperlipidemia, history of urinary incontinence, dementia, neuropathy, history of peripheral edema.  Patient is a poor historian secondary to her advanced dementia.  Patient's daughter reports that for the last she has been off of her Lasix.  Initially they were unable to follow-up with a primary care doctor and then patient developed acute gallstone pancreatitis and required admission at [facility].  During admission her Lasix was not resumed but the daughter is unsure why.  She also reports that the patient underwent a cholecystectomy during the admission.  He was discharged home on Wednesday and had initially been doing better but then Friday night she developed a cough and this morning she had a low-grade temperature of 100.1.  Daughter was concerned in the setting of her recent surgery.  She also noted that patient was complaining of some abdominal discomfort.  She further notes that patient's legs have been progressively more swollen and now developed some redness and increased warmth.  Denies any vomiting chest pain.  Daughter further reports that during her hospitalization course her metoprolol dose was decreased from 25 mg to 12.5 milligrams. Patient is a 77-year-old female who presents to the emergency room with a chief complaints of fever in the setting of a recent cholecystectomy.  Past medical history of hypertension, hyperlipidemia, history of urinary incontinence, dementia, neuropathy, history of peripheral edema.  Patient is a poor historian secondary to her advanced dementia.  Patient's daughter reports that for the last she has been off of her Lasix.  Initially they were unable to follow-up with a primary care doctor and then patient developed acute gallstone pancreatitis and required admission at [facility].  During admission her Lasix was not resumed but the daughter is unsure why.  She also reports that the patient underwent a cholecystectomy during the admission.  He was discharged home on Wednesday and had initially been doing better but then Friday night she developed a cough and this morning she had a low-grade temperature of 100.1.  Daughter was concerned in the setting of her recent surgery.  She also noted that patient was complaining of some abdominal discomfort.  She further notes that patient's legs have been progressively more swollen and now developed some redness and increased warmth.  Denies any vomiting chest pain.  Daughter further reports that during her hospitalization course her metoprolol dose was decreased from 25 mg to 12.5 milligrams.

## 2022-12-04 NOTE — CONSULT NOTE ADULT - ASSESSMENT
Assessment/Plan:  This is an obese 78 y/o F with no cardiac Hx, s/p cholecystectomy 1 week ago presented to the ED c/o fever and some abdominal tenderness, found to have main pulmonary enlargement and possible volume overload.  Also noted to have possible cellulitis with superimposed edema of BLE.    Volume Overload/Edema  - No known Hx of CAD or HF  - Pro-BNP is elevated at 2000 and has BLE edema with concurrent cellulitis  - s/p Lasix 40 mg IV x 1 in ED.  Comfortable on RA but with diminished BS on exam  - Can give another dose of Lasix 40 mg IV in am, 12/4  - Strict I/O's and daily weights  - Obtain TTE to assess cardiac structures.  CT chest suggestive of pHTN.  Also has cardiac murmur of mild-moderate in significance    - Sepsis w/u per Primary    Monitor electrolytes, replete to keep K>4 and Mag>  Will continue to follow    Mindy Flood DNP, NP-C  Cardiology  Spectra #[phone]         Assessment/Plan:  This is an obese 76 y/o F with no cardiac Hx, s/p cholecystectomy 1 week ago presented to the ED c/o fever and some abdominal tenderness, found to have main pulmonary enlargement and possible volume overload.  Also noted to have possible cellulitis with superimposed edema of BLE.    Volume Overload/Edema  - No known Hx of CAD or HF  - Pro-BNP is elevated at 2000 and has BLE edema with concurrent cellulitis  - s/p Lasix 40 mg IV x 1 in ED.  Comfortable on RA but with diminished BS on exam  - Can give another dose of Lasix 40 mg IV in am, 12/4  - Strict I/O's and daily weights  - Obtain TTE to assess cardiac structures.  CT chest suggestive of pHTN.  Also has cardiac murmur of mild-moderate in significance    - Sepsis w/u per Primary    Monitor electrolytes, replete to keep K>4 and Mag>  Will continue to follow    Mindy Flood DNP, NP-C  Cardiology  Spectra #[phone]         Assessment/Plan:  This is an obese 78 y/o F with no cardiac Hx, s/p cholecystectomy 1 week ago presented to the ED c/o fever and some abdominal tenderness, found to have main pulmonary enlargement and possible volume overload.  Also noted to have possible cellulitis with superimposed edema of BLE.    Volume Overload/Edema  - No known Hx of CAD or HF  - Pro-BNP is elevated at 2000 and has BLE edema with concurrent cellulitis  - s/p Lasix 40 mg IV x 1 in ED.  Comfortable on RA but with diminished BS on exam  - Can give another dose of Lasix 40 mg IV in am, 12/4  - Strict I/O's and daily weights  - Obtain TTE to assess cardiac structures.  CT chest suggestive of pHTN.  Also has cardiac murmur of mild-moderate in significance    - Sepsis w/u per Primary    Monitor electrolytes, replete to keep K>4 and Mag>  Will continue to follow    Mindy Flood DNP, NP-C  Cardiology  Spectra #[phone]         Assessment/Plan:  This is an obese 78 y/o F with no cardiac Hx, s/p cholecystectomy 1 week ago presented to the ED c/o fever and some abdominal tenderness, found to have main pulmonary enlargement and possible volume overload.  Also noted to have possible cellulitis with superimposed edema of BLE.    Volume Overload/Edema  - No known Hx of CAD or HF  - Pro-BNP is elevated at 2000 and has BLE edema with concurrent cellulitis  - s/p Lasix 40 mg IV x 1 in ED.  Comfortable on RA but with diminished BS on exam  - Can give another dose of Lasix 40 mg IV in am, 12/4``  - Strict I/O's and daily weights  - Obtain TTE to assess cardiac structures.  CT chest suggestive of pHTN.  Also has cardiac murmur of mild-moderate in significance  - No signs of active ischemia    - Sepsis w/u per Primary    Monitor electrolytes, replete to keep K>4 and Mag>  Will continue to follow    Mindy Flood DNP, NP-C  Cardiology  Spectra #[phone]         Assessment/Plan:  This is an obese 78 y/o F with no cardiac Hx, s/p cholecystectomy 1 week ago presented to the ED c/o fever and some abdominal tenderness, found to have main pulmonary enlargement and possible volume overload.  Also noted to have possible cellulitis with superimposed edema of BLE.    Volume Overload/Edema  - No known Hx of CAD or HF  - Pro-BNP is elevated at 2000 and has BLE edema with concurrent cellulitis  - s/p Lasix 40 mg IV x 1 in ED.  Comfortable on RA but with diminished BS on exam  - Can give another dose of Lasix 40 mg IV in am, 12/4``  - Strict I/O's and daily weights  - Obtain TTE to assess cardiac structures.  CT chest suggestive of pHTN.  Also has cardiac murmur of mild-moderate in significance  - No signs of active ischemia    - Sepsis w/u per Primary    Monitor electrolytes, replete to keep K>4 and Mag>  Will continue to follow    Mindy Flood DNP, NP-C  Cardiology  Spectra #[phone]         Assessment/Plan:  This is an obese 76 y/o F with no cardiac Hx, s/p cholecystectomy 1 week ago presented to the ED c/o fever and some abdominal tenderness, found to have main pulmonary enlargement and possible volume overload.  Also noted to have possible cellulitis with superimposed edema of BLE.    Volume Overload/Edema  - No known Hx of CAD or HF  - Pro-BNP is elevated at 2000 and has BLE edema with concurrent cellulitis  - s/p Lasix 40 mg IV x 1 in ED.  Comfortable on RA but with diminished BS on exam  - Can give another dose of Lasix 40 mg IV in am, 12/4``  - Strict I/O's and daily weights  - Obtain TTE to assess cardiac structures.  CT chest suggestive of pHTN.  Also has cardiac murmur of mild-moderate in significance  - No signs of active ischemia    - Sepsis w/u per Primary    Monitor electrolytes, replete to keep K>4 and Mag>  Will continue to follow    Mindy Flood DNP, NP-C  Cardiology  Spectra #[phone]         Assessment/Plan:  This is an obese 76 y/o F with no cardiac Hx, s/p cholecystectomy 1 week ago presented to the ED c/o fever and some abdominal tenderness, found to have main pulmonary enlargement and possible volume overload.  Also noted to have possible cellulitis with superimposed edema of BLE.    Volume Overload/Edema  - No known Hx of CAD or HF  - Pro-BNP is elevated at 2000 and has BLE edema with concurrent cellulitis  - s/p Lasix 40 mg IV x 1 in ED.  Comfortable on RA but with diminished BS on exam  - Can give another dose of Lasix 40 mg IV in am, 12/4``  - Strict I/O's and daily weights  - Obtain TTE to assess cardiac structures.  CT chest suggestive of pHTN.  Also has cardiac murmur of mild-moderate in significance    - EKG with poor baseline. I would repeat. Appears to be SR without sig ischemia.     - Sepsis w/u per Primary    Monitor electrolytes, replete to keep K>4 and Mag>  Will continue to follow    Mindy Flood DNP, NP-C  Cardiology  Spectra #[phone]         Assessment/Plan:  This is an obese 78 y/o F with no cardiac Hx, s/p cholecystectomy 1 week ago presented to the ED c/o fever and some abdominal tenderness, found to have main pulmonary enlargement and possible volume overload.  Also noted to have possible cellulitis with superimposed edema of BLE.    Volume Overload/Edema  - No known Hx of CAD or HF  - Pro-BNP is elevated at 2000 and has BLE edema with concurrent cellulitis  - s/p Lasix 40 mg IV x 1 in ED.  Comfortable on RA but with diminished BS on exam  - Can give another dose of Lasix 40 mg IV in am, 12/4``  - Strict I/O's and daily weights  - Obtain TTE to assess cardiac structures.  CT chest suggestive of pHTN.  Also has cardiac murmur of mild-moderate in significance    - EKG with poor baseline. I would repeat. Appears to be SR without sig ischemia.     - Sepsis w/u per Primary    Monitor electrolytes, replete to keep K>4 and Mag>  Will continue to follow    Mindy Flood DNP, NP-C  Cardiology  Spectra #[phone]         Assessment/Plan:  This is an obese 76 y/o F with no cardiac Hx, s/p cholecystectomy 1 week ago presented to the ED c/o fever and some abdominal tenderness, found to have main pulmonary enlargement and possible volume overload.  Also noted to have possible cellulitis with superimposed edema of BLE.    Volume Overload/Edema  - No known Hx of CAD or HF  - Pro-BNP is elevated at 2000 and has BLE edema with concurrent cellulitis  - s/p Lasix 40 mg IV x 1 in ED.  Comfortable on RA but with diminished BS on exam  - Can give another dose of Lasix 40 mg IV in am, 12/4``  - Strict I/O's and daily weights  - Obtain TTE to assess cardiac structures.  CT chest suggestive of pHTN.  Also has cardiac murmur of mild-moderate in significance    - EKG with poor baseline. I would repeat. Appears to be SR without sig ischemia.     - Sepsis w/u per Primary    Monitor electrolytes, replete to keep K>4 and Mag>  Will continue to follow    Mindy Flood DNP, NP-C  Cardiology  Spectra #[phone]

## 2022-12-04 NOTE — ED PROVIDER NOTE - NSICDXPASTMEDICALHX_GEN_ALL_CORE_FT
PAST MEDICAL HISTORY:  Diabetes mellitus     Glaucoma     Hyperlipidemia     Hypertension     Right cataract surgery 11/29/22

## 2022-12-04 NOTE — H&P ADULT - NSHPPHYSICALEXAM_GEN_ALL_CORE
GENERAL: patient appears well, no acute distress, appropriate, pleasant  EYES: sclera clear, no exudates  ENMT: oropharyngeal mucosa dry, no exudates  NECK: supple, soft, no thyromegaly noted, NO JVD  LUNGS: poor effort, clear to auscultation, symmetric breath sounds, no wheezing or rhonchi appreciated  HEART: + LE edema, erythema and warmth surrounding ankles. + S1/S2, regular rate and rhythm, no murmurs noted  GASTROINTESTINAL: Mild tenderness suprapubic, LLQ, and RLQ. Incisions from recent cholecystectomy present, healing well. No rebounding/guarding/rigidity present on exam. abdomen is soft, nontender, nondistended, normoactive bowel sounds, no palpable masses  INTEGUMENT: warm skin, appears well perfused  MUSCULOSKELETAL: no clubbing or cyanosis, no obvious deformity  NEUROLOGIC: A&O x 3  PSYCHIATRIC: flat affect, seemingly poor memory  HEME/LYMPH: no palpable supraclavicular nodules, no obvious ecchymosis or petechiae  SKIN: B/l erythema and warmth of LEs near ankles GENERAL: patient appears well, no acute distress, appropriate, pleasant  EYES: sclera clear, no exudates  ENMT: oropharyngeal mucosa dry, no exudates  NECK: supple, soft, no thyromegaly noted, NO JVD  LUNGS: poor effort, clear to auscultation, symmetric breath sounds, no wheezing or rhonchi appreciated  HEART: + LE edema, erythema and warmth surrounding ankles. + S1/S2, regular rate and rhythm, no murmurs noted  GASTROINTESTINAL: Mild tenderness suprapubic, LLQ, and RLQ. Incisions from recent cholecystectomy present, healing well. No rebounding/guarding/rigidity present on exam. abdomen is soft, nontender, nondistended, normoactive bowel sounds, no palpable masses  INTEGUMENT: warm skin, appears well perfused, erythema of b/l LE  MUSCULOSKELETAL: no clubbing or cyanosis, no obvious deformity  NEUROLOGIC: A&O x 2, person and place, moving all extremities, speech fluent  PSYCHIATRIC: flat affect, seemingly poor memory  HEME/LYMPH: no palpable supraclavicular nodules, no obvious ecchymosis or petechiae  SKIN: B/l erythema and warmth of LEs near ankles

## 2022-12-04 NOTE — H&P ADULT - ASSESSMENT
78 y/o F PMHx of dementia, diabetes?, HTN, HLD s/p cholecystectomy 1 week ago w/ otherwise unknown medical history presented to the ED by ambulance for fever at home, abd discomfort/pain, and cough since last night 12/3 with CT showing mild central biliary distention admitted for possible choledocholithiasis s/p lap cholecystectomy and apparent volume overload. 76 y/o F PMHx of dementia, diabetes?, HTN, HLD s/p cholecystectomy 1 week ago w/ otherwise unknown medical history presented to the ED by ambulance for fever at home, abd discomfort/pain, and cough since last night 12/3 with CT showing mild central biliary distention admitted for possible choledocholithiasis s/p lap cholecystectomy and apparent volume overload.

## 2022-12-04 NOTE — H&P ADULT - PROBLEM SELECTOR PLAN 3
SOB x 1 day, reported fever prior to admission, WBC normal  CXR: The lungs are clear with parenchymal density at the left CP angle that may reflect focus of atelectasis  CT PE Chest + Abd and pelvis: Elevated left hemidiaphragm and partial left lower lobe atelectasis. Superimposed infection cannot be excluded  Empiric IV azithro + IV rocephin started, continue  Trend wbc, fever SOB x 1 day, reported fever prior to admission, WBC normal, procal .25  CXR: The lungs are clear with parenchymal density at the left CP angle that may reflect focus of atelectasis  CT PE Chest + Abd and pelvis: Elevated left hemidiaphragm and partial left lower lobe atelectasis. Superimposed infection cannot be excluded  Empiric IV azithro + IV rocephin started, continue  Trend wbc, fever SOB x 1 day, reported fever prior to admission, WBC normal, procal .25  CXR: The lungs are clear with parenchymal density at the left CP angle that may reflect focus of atelectasis  CT PE Chest + Abd and pelvis: Elevated left hemidiaphragm and partial left lower lobe atelectasis. Superimposed infection cannot be excluded  Empiric IV azithro + IV rocephin started, continue  Trend wbc, fever  -Dr Alvarez infectious disease consulted due to concern for possible PNA and also cellulitis of LE  -repeat CT chest in one month to ensure resolution of possible PNA 1 day of SOB, significant b/l LE edema and ankle erythema w/ warmth c/w cellulitis  Duplex u/s b/l LE - No evidence of deep venous thrombosis in either lower extremity  Covered with empiric PNA coverage  Monitor

## 2022-12-04 NOTE — H&P ADULT - PROBLEM SELECTOR PLAN 7
Med rec performed  Patient seemingly on Metoprolol, Myrbetriq, Tramadol, Rosuvastatin, Duloxetine, Lisinopril  Some meds with sub 30 day supply, some meds 30 day supply last filled > 30 days ago  Team taking over care 12/5 d/w pharmacy official med rec      #Meds for unknown diagnoses  Duloxetine and Myrbetriq (overactive bladder)? Med rec performed  Patient seemingly on Metoprolol, Myrbetriq, Tramadol, Rosuvastatin, Duloxetine, Lisinopril  Some meds with sub 30 day supply, some meds 30 day supply last filled > 30 days ago  Team taking over care 12/5 d/w pharmacy official med rec      #Meds for unknown diagnoses  Duloxetine  Myrbetriq (overactive bladder?) - not on formulary Unknown dx  Continue home duloxetine Home rosuvastatin 5mg QD  Therapeutic interchange  AM lipid panel

## 2022-12-04 NOTE — H&P ADULT - PROBLEM SELECTOR PLAN 4
Patient hypertensive on admission  Continue home lisinopril w/ hold parameters SOB x 1 day, reported fever prior to admission, WBC normal, procal .25  CXR: The lungs are clear with parenchymal density at the left CP angle that may reflect focus of atelectasis  CT PE Chest + Abd and pelvis: Elevated left hemidiaphragm and partial left lower lobe atelectasis. Superimposed infection cannot be excluded  Empiric IV azithro + IV rocephin started, continue  Trend wbc, fever  -Dr Alvarez infectious disease consulted due to concern for possible PNA and also cellulitis of LE  -repeat CT chest in one month to ensure resolution of possible PNA

## 2022-12-04 NOTE — H&P ADULT - PROBLEM SELECTOR PLAN 8
Lovenox 40mg qD        #DISPO  PT consult - f/u Med rec performed  Patient seemingly on Metoprolol, Myrbetriq, Tramadol, Rosuvastatin, Duloxetine, Lisinopril  Some meds with sub 30 day supply, some meds 30 day supply last filled > 30 days ago  Team taking over care 12/5 d/w pharmacy official med rec      #MED REC NEEDED  Myrbetriq (overactive bladder?) - not on formulary Unknown dx  Continue home duloxetine

## 2022-12-04 NOTE — ED PROVIDER NOTE - WET READ LAUNCH FT
constult heme/onc for CT lymph nodes and exam findings  ALEXANDER - ferritin increased  69 yo M with a past medical history of renal transplant (on tacrolimus, unk date), complicated by low grade lymphoma post transplant, DM2, HTN, MGUS, pAF, BPH, HLD, CAD (s/p CABG 15 years ago), presents with fever, chills and worsening cough for 5 days. Patient states that he has a productive cough with brownish colored sputum. Found to have acute hypoxic respiratory failure and sepsis 2/2 likely multifocal pneumonia.    Neuro    #pt intubated and sedated  - propofol 50mcg/kg/hr RASS -5      CV  # HTN  - on home amlodipine 10mg, furosemide 20mg  - restart as needed    # HLD  - on home rosuvastatin 10mg  # CAD (s/p CABG 15 yrs ago)  - on home isosorbide mononitrate 30mg    # pAF  - on home eliquis 5mg BID, continue    Pulm  # Acute hypoxic respiratory failure  Likely 2/2 multifocal pneumonia. Increasing work of breathing in ED requiring intubation on 7/6. Chest CT (7/7) showing Large airspace consolidation right upper lobe. Complete consolidation of the right lower lobe. Small subsegmental atelectasis/consolidation left upper lobe.  - F/u results of bronchoscopy done this AM  - continue zosyn 3.375g Q6hrs at renally dosed levels for pseudomonal pneumonia  - continue azithromycin 500mg Q24hrs for suspected legionella pneumonia  - Strict I's and O's  - Daily CXR HERE    GI  no active issues  npo while on BIPAP    ID  # Sepsis  Patient with 4/4 SIRS criteria (febrile, tachycardic, tachypneic, and WBC) likely 2/2 multifocal pneumonia. Patient with recent Z-pack use, but no known past hospitalizations or other antibiotics in past 3 months. Labs in ED notable for lactate 1.1 and VBG 7.33, CO2 38, HCO3 20. UA with specific gravity 1.030.  - s/p 3L NS, Vancomycin, and Zosyn  - will continue Azithromycin 500mg until legionella is ruled out  - Linezolid 600mg q12h to cover MRSA   - Zosyn 4.5g q6h to cover Pseudomonas  - f/u Tacrolimus level  - f/u urine strep and legionella  - Staph positive, MRSA negative  -RVP negative  -COVID negative  -BCX no growth to date (7/5)    - Nephrology consult for collateral on tacrolimus and if patient is on other immunosuppressive agents (Dr. Leyva and Dr. Preciado)  - Strict I's and O's with q6h bladder scans    Renal  # Renal Transplant  - Will d/c lasix because of elevated lasix  - History of renal transplant on unknown date. Reported tacrolimus use. Unknown if other immunosuppressives being use. Patient reports creatinine baseline 1.0.  - Cr 1.18 today  - Nephrology consult for collateral on tacrolimus and if patient is on other immunosuppressive agents (Dr. Leyva and Dr. Preciado)  - Strict I's and O's with q6h bladder scans  - insulin/dextrose given for hyperkalemia (K 5.2) will recheck BMP in PM      # BPH  - On tamsulosin 0.4mg    Heme  # anemia  - Hgb 10.3, normocytic anemia    Endo  # DM2  -  today  - 20U NPH given  - Pt on home lantus 45 at night and lispro 45 TID  - Started Lantus 25U  - Moderate insulin sliding scale    DERM  # lluvia reticular non-blanching rash from upper thighs to knees b/l  - consult Dermatology    F: none  E: replete PRN  N: NPO while on BiPAP  G: none  D:     Dispo: MICU    CODE: Full         67 yo M with a past medical history of renal transplant (on tacrolimus, unk date), complicated by low grade lymphoma post transplant, DM2, HTN, MGUS, pAF, BPH, HLD, CAD (s/p CABG 15 years ago), presents with fever, chills and worsening cough for 5 days. Patient states that he has a productive cough with brownish colored sputum. Found to have acute hypoxic respiratory failure and sepsis 2/2 likely multifocal pneumonia.    Neuro    #pt intubated and sedated  - propofol 50mcg/kg/hr RASS -5      CV  # HTN  - on home amlodipine 10mg, furosemide 20mg  - restart as needed    # HLD  - on home rosuvastatin 10mg  # CAD (s/p CABG 15 yrs ago)  - on home isosorbide mononitrate 30mg    # pAF  - d/c'd home eliquis 5mg BID. Will restart when appropriate    Pulm  # Acute hypoxic respiratory failure  Likely 2/2 multifocal pneumonia. Increasing work of breathing in ED requiring intubation on 7/6. Chest CT (7/7) showing Large airspace consolidation right upper lobe. Complete consolidation of the right lower lobe. Small subsegmental atelectasis/consolidation left upper lobe.  - F/u results of bronchoscopy done this AM  - Strict I's and O's  - Daily CXR    GI  -no active issues      ID  # Sepsis  Patient with 4/4 SIRS criteria (febrile, tachycardic, tachypneic, and WBC) likely 2/2 multifocal pneumonia. Patient with recent Z-pack use, but no known past hospitalizations or other antibiotics in past 3 months. Labs in ED notable for lactate 1.1 and VBG 7.33, CO2 38, HCO3 20. UA with specific gravity 1.030. s/p 3L NS, Vancomycin, and Zosyn.    -continue Azithromycin 500mg to cover atypicals  - Linezolid 600mg q12h to cover MRSA   - Zosyn 3.375g (renal dosing) q6h to cover Pseudomonas pneumonia  - f/u urine strep  - Urine legionella negative  - Staph PCR positive  -MRSA PCR negative  -RVP negative  -COVID negative  -BCX no growth to date (7/5)      Renal  # Renal Transplant  - History of renal transplant on unknown date.  Patient reports creatinine baseline 1.0.  - Nephrology (Dr. Leyva and Dr. Preciado) on board  - d/c lasix because of elevated creatinine  - f/u Tacrolimus level  - Strict I's and O's with q6h bladder scans    #Acute kidney injury  -BUN of 32 and Cr of 1.7 today  - will order urine electrolytes (FeNa, FeUrea) to identify source of LATRICIA        # BPH  - on tamsulosin 0.4mg at home. Will restart when appropriate    Heme HERE  # Iron deficiency anemia  - serum iron of 26  - will do FOBT to evaluate potential source of blood loss  - gen surgery OP lymph node bx, IR will do excisional bipsy in AM, heme onc reccs PET scan    Endo  # DM2  - Pt on home lantus 45 at night and lispro 45 TID  - Continue Lantus 25U  - Moderate insulin sliding scale    DERM  # lluvia reticular non-blanching rash from upper thighs to knees b/l  - consult Dermatology    F: none  E: replete PRN  N: NPO while on BiPAP  G: none  D:     Dispo: MICU    CODE: Full         69 yo M with a past medical history of renal transplant (on tacrolimus, unk date), complicated by low grade lymphoma post transplant, DM2, HTN, MGUS, pAF, BPH, HLD, CAD (s/p CABG 15 years ago), presents with fever, chills and worsening cough for 5 days. Patient states that he has a productive cough with brownish colored sputum. Found to have acute hypoxic respiratory failure and sepsis 2/2 likely multifocal pneumonia.    Neuro    #pt intubated and sedated  - propofol 50mcg/kg/hr RASS -5      CV  # HTN  - on home amlodipine 10mg, furosemide 20mg  - restart as needed    # HLD  - on home rosuvastatin 10mg  # CAD (s/p CABG 15 yrs ago)  - on home isosorbide mononitrate 30mg    # pAF  - d/c'd home eliquis 5mg BID. Will restart when appropriate    Pulm  # Acute hypoxic respiratory failure  Likely 2/2 multifocal pneumonia. Increasing work of breathing in ED requiring intubation on 7/6. Chest CT (7/7) showing Large airspace consolidation right upper lobe. Complete consolidation of the right lower lobe. Small subsegmental atelectasis/consolidation left upper lobe.  - F/u results of bronchoscopy done this AM  - Strict I's and O's  - Daily CXR      GI  -no active issues      ID  # Sepsis  Patient with 4/4 SIRS criteria (febrile, tachycardic, tachypneic, and WBC) likely 2/2 multifocal pneumonia. Patient with recent Z-pack use, but no known past hospitalizations or other antibiotics in past 3 months. Labs in ED notable for lactate 1.1 and VBG 7.33, CO2 38, HCO3 20. UA with specific gravity 1.030. s/p 3L NS, Vancomycin, and Zosyn.    -continue Azithromycin 500mg to cover atypicals  - Linezolid 600mg q12h to cover MRSA   - Zosyn 3.375g (renal dosing) q6h to cover Pseudomonas pneumonia  - f/u urine strep  - Urine legionella negative  - Staph PCR positive  -MRSA PCR negative  -RVP negative  -COVID negative  -BCX no growth to date (7/5)      Renal  # Renal Transplant  - History of renal transplant on unknown date.  Patient reports creatinine baseline 1.0.  - Nephrology (Dr. Leyva and Dr. Preciado) on board  - d/c lasix because of elevated creatinine  - f/u Tacrolimus level  - Strict I's and O's with q6h bladder scans    #Acute kidney injury  -BUN of 32 and Cr of 1.7 today  - will order urine electrolytes (FeNa, FeUrea) to identify source of LATRICIA        # BPH  - on tamsulosin 0.4mg at home. Will restart when appropriate    Heme  # Iron deficiency anemia  - serum iron of 26  - will do FOBT to evaluate potential source of blood loss    #Hx of lymphoma post transplant  - consulted surgery, recommend OP lymph node Bx  - IR will do excisional lymph node Bx in AM tomorrow  - consulted heme/onc, recommend PET scan and CT with contrast    Endo  # DM2  - Pt on home lantus 45 at night and lispro 45 TID  - serum glucose of 173 this AM  -continue insulin 2 units ggt/hr    DERM  # lluvia reticular non-blanching rash from upper thighs to knees b/l  - consult Dermatology    F: none  E: replete PRN  N: NPO while on BiPAP  G: none  D:     Dispo: MICU    CODE: Full         There are no Wet Read(s) to document.

## 2022-12-04 NOTE — CONSULT NOTE ADULT - SUBJECTIVE AND OBJECTIVE BOX
Chief Complaint:  Patient is a 77y old  Female who presents with a chief complaint of SOB, abd pain found to have dilated cbd on imaging  78 y/o F PMHx of dementia, diabetes?, HTN, HLD s/p cholecystectomy 1 week ago w/ otherwise unknown medical history presented to the ED by ambulance. History obtained through chart review and patient. Patient is a poor historian and daughter called twice but unable to be reached. Patient allegedly brought in from home for fever (not documented in ED), abd discomfort, and cough since last night 12/3. During interview patient only endorsing lower abdominal pain, patient visibly comfortable and in NAD though rates pain 7/10. Allegedly patient off Lasix for 2 weeks due to prescription issue, no antipyretic used prior to admission. Patient does not endorse any other symptoms.    IN THE ED:  Temp  98.2 F , HR 72 ,  / 60 ,RR 18 , SpO2 97 on RA  S/P: Lasix 40 IVP, 1 x IV azithro, 1 x IV rocephin  EKG: Rate 68, ST&T wave abnormality, likely poor EKG  Labs significant for Glucose 155, albumin 2.4, Alk phos 139, procal .25, BNP 2037  Imaging:  CXR: The lungs are clear with parenchymal density at the left CP angle that may reflect focus of atelectasis  Duplex u/s b/l LE - No evidence of deep venous thrombosis in either lower extremity  CT PE Chest + Abd and pelvis: Main pulmonary artery enlargement may reflect pulmonary arterial hypertension. Elevated left hemidiaphragm and partial left lower lobe atelectasis. Superimposed infection cannot be excluded. Follow to resolution with repeat chest CT in one month, or sooner as clinically warranted. Cholecystectomy. No loculated fluid collection along the gallbladder   fossa. Mild central biliary distention may be due to postcholecystectomy status.     Review of Systems:  Review of Systems: ROS LIMITED 2/2 MENTAL STATUS, PATIENT FORGETFUL    Constitutional: denies fever, chills, sweating  Respiratory: denies SOB, cough, or wheezing  Cardiovascular: denies CP  Gastrointestinal: denies nausea, vomiting, diarrhea, constipation, bloody stools. Admits abdominal pain now resolved  Genitourinary: denies painful urination, increased frequency, urgency, or bloody urine  Musculoskeletal: denies muscle aches, joint swelling, or muscle weakness  Neurologic: denies loss of sensation, numbness, or tingling  ROS negative except as noted above    Allergies:  penicillin (Hives)      Medications:  acetaminophen     Tablet .. 650 milliGRAM(s) Oral every 6 hours PRN  atorvastatin 20 milliGRAM(s) Oral at bedtime  cefTRIAXone   IVPB 1000 milliGRAM(s) IV Intermittent every 24 hours  dextrose 5%. 1000 milliLiter(s) IV Continuous <Continuous>  dextrose 5%. 1000 milliLiter(s) IV Continuous <Continuous>  dextrose 50% Injectable 25 Gram(s) IV Push once  dextrose 50% Injectable 12.5 Gram(s) IV Push once  dextrose 50% Injectable 25 Gram(s) IV Push once  dextrose Oral Gel 15 Gram(s) Oral once PRN  DULoxetine 30 milliGRAM(s) Oral daily  enoxaparin Injectable 40 milliGRAM(s) SubCutaneous every 24 hours  furosemide   Injectable 40 milliGRAM(s) IV Push daily  glucagon  Injectable 1 milliGRAM(s) IntraMuscular once  influenza  Vaccine (HIGH DOSE) 0.7 milliLiter(s) IntraMuscular once  insulin lispro (ADMELOG) corrective regimen sliding scale   SubCutaneous every 6 hours  lisinopril 20 milliGRAM(s) Oral daily  metoprolol succinate ER 12.5 milliGRAM(s) Oral daily  metroNIDAZOLE  IVPB 500 milliGRAM(s) IV Intermittent every 8 hours  ondansetron Injectable 4 milliGRAM(s) IV Push every 8 hours PRN  traMADol 50 milliGRAM(s) Oral once PRN      PMHX/PSHX:  Diabetes mellitus    Hyperlipidemia    Hypertension    S/P cholecystectomy        Family history:  No pertinent family history in first degree relatives        Social History:   no etoh no cigs no ivda    ROS:     General:  No wt loss, fevers, chills, night sweats, fatigue,   Eyes:  Good vision, no reported pain  ENT:  No sore throat, pain, runny nose, dysphagia  CV:  No pain, palpitations, hypo/hypertension  Resp:  No dyspnea, cough, tachypnea, wheezing  GI:  No pain, No nausea, No vomiting, No diarrhea, No constipation, No weight loss, No fever, No pruritis, No rectal bleeding, No tarry stools, No dysphagia,  :  No pain, bleeding, incontinence, nocturia  Muscle:  No pain, weakness  Neuro:  No weakness, tingling, memory problems  Psych:  No fatigue, insomnia, mood problems, depression  Endocrine:  No polyuria, polydipsia, cold/heat intolerance  Heme:  No petechiae, ecchymosis, easy bruisability  Skin:  No rash, tattoos, scars, edema      PHYSICAL EXAM:   Vital Signs:  Vital Signs Last 24 Hrs  T(C): 36.7 (05 Dec 2022 12:42), Max: 37 (04 Dec 2022 21:24)  T(F): 98 (05 Dec 2022 12:42), Max: 98.6 (04 Dec 2022 21:24)  HR: 75 (05 Dec 2022 12:42) (70 - 85)  BP: 135/80 (05 Dec 2022 12:42) (135/80 - 190/84)  BP(mean): --  RR: 18 (05 Dec 2022 12:42) (16 - 18)  SpO2: 95% (05 Dec 2022 12:42) (95% - 100%)    Parameters below as of 05 Dec 2022 12:42  Patient On (Oxygen Delivery Method): room air      Daily     Daily     GENERAL:  Appears stated age, well-groomed, well-nourished, no distress  HEENT:  NC/AT,  conjunctivae clear and pink, no thyromegaly, nodules, adenopathy, no JVD, sclera -anicteric  CHEST:  Full & symmetric excursion, no increased effort, breath sounds clear  HEART:  Regular rhythm, S1, S2, no murmur/rub/S3/S4, no abdominal bruit, no edema  ABDOMEN:  Soft, non-tender, non-distended, normoactive bowel sounds,  no masses ,no hepato-splenomegaly, no signs of chronic liver disease  EXTEREMITIES:  no cyanosis,clubbing or edema  SKIN:  No rash/erythema/ecchymoses/petechiae/wounds/abscess/warm/dry  NEURO:  Alert, oriented, no asterixis, no tremor, no encephalopathy    LABS:                        12.3   8.49  )-----------( 276      ( 05 Dec 2022 05:37 )             39.5     12    140  |  109<H>  |  14  ----------------------------<  135<H>  4.0   |  23  |  1.10    Ca    8.6      05 Dec 2022 05:37  Phos  2.5     12-  Mg     2.1     -    TPro  6.8  /  Alb  2.5<L>  /  TBili  0.4  /  DBili  x   /  AST  16  /  ALT  42  /  AlkPhos  132<H>  12-    LIVER FUNCTIONS - ( 05 Dec 2022 05:37 )  Alb: 2.5 g/dL / Pro: 6.8 g/dL / ALK PHOS: 132 U/L / ALT: 42 U/L / AST: 16 U/L / GGT: x           PT/INR - ( 04 Dec 2022 08:30 )   PT: 11.6 sec;   INR: 0.99 ratio         PTT - ( 04 Dec 2022 08:30 )  PTT:27.7 sec  Urinalysis Basic - ( 05 Dec 2022 08:11 )    Color: Yellow / Appearance: Clear / S.010 / pH: x  Gluc: x / Ketone: Negative  / Bili: Negative / Urobili: Negative   Blood: x / Protein: Negative / Nitrite: Negative   Leuk Esterase: Negative / RBC: x / WBC x   Sq Epi: x / Non Sq Epi: x / Bacteria: x          Imaging:             Chief Complaint:  Patient is a 77y old  Female who presents with a chief complaint of SOB, abd pain found to have dilated cbd on imaging  76 y/o F PMHx of dementia, diabetes?, HTN, HLD s/p cholecystectomy 1 week ago w/ otherwise unknown medical history presented to the ED by ambulance. History obtained through chart review and patient. Patient is a poor historian and daughter called twice but unable to be reached. Patient allegedly brought in from home for fever (not documented in ED), abd discomfort, and cough since last night 12/3. During interview patient only endorsing lower abdominal pain, patient visibly comfortable and in NAD though rates pain 7/10. Allegedly patient off Lasix for 2 weeks due to prescription issue, no antipyretic used prior to admission. Patient does not endorse any other symptoms.    IN THE ED:  Temp  98.2 F , HR 72 ,  / 60 ,RR 18 , SpO2 97 on RA  S/P: Lasix 40 IVP, 1 x IV azithro, 1 x IV rocephin  EKG: Rate 68, ST&T wave abnormality, likely poor EKG  Labs significant for Glucose 155, albumin 2.4, Alk phos 139, procal .25, BNP 2037  Imaging:  CXR: The lungs are clear with parenchymal density at the left CP angle that may reflect focus of atelectasis  Duplex u/s b/l LE - No evidence of deep venous thrombosis in either lower extremity  CT PE Chest + Abd and pelvis: Main pulmonary artery enlargement may reflect pulmonary arterial hypertension. Elevated left hemidiaphragm and partial left lower lobe atelectasis. Superimposed infection cannot be excluded. Follow to resolution with repeat chest CT in one month, or sooner as clinically warranted. Cholecystectomy. No loculated fluid collection along the gallbladder   fossa. Mild central biliary distention may be due to postcholecystectomy status.     Review of Systems:  Review of Systems: ROS LIMITED 2/2 MENTAL STATUS, PATIENT FORGETFUL    Constitutional: denies fever, chills, sweating  Respiratory: denies SOB, cough, or wheezing  Cardiovascular: denies CP  Gastrointestinal: denies nausea, vomiting, diarrhea, constipation, bloody stools. Admits abdominal pain now resolved  Genitourinary: denies painful urination, increased frequency, urgency, or bloody urine  Musculoskeletal: denies muscle aches, joint swelling, or muscle weakness  Neurologic: denies loss of sensation, numbness, or tingling  ROS negative except as noted above    Allergies:  penicillin (Hives)      Medications:  acetaminophen     Tablet .. 650 milliGRAM(s) Oral every 6 hours PRN  atorvastatin 20 milliGRAM(s) Oral at bedtime  cefTRIAXone   IVPB 1000 milliGRAM(s) IV Intermittent every 24 hours  dextrose 5%. 1000 milliLiter(s) IV Continuous <Continuous>  dextrose 5%. 1000 milliLiter(s) IV Continuous <Continuous>  dextrose 50% Injectable 25 Gram(s) IV Push once  dextrose 50% Injectable 12.5 Gram(s) IV Push once  dextrose 50% Injectable 25 Gram(s) IV Push once  dextrose Oral Gel 15 Gram(s) Oral once PRN  DULoxetine 30 milliGRAM(s) Oral daily  enoxaparin Injectable 40 milliGRAM(s) SubCutaneous every 24 hours  furosemide   Injectable 40 milliGRAM(s) IV Push daily  glucagon  Injectable 1 milliGRAM(s) IntraMuscular once  influenza  Vaccine (HIGH DOSE) 0.7 milliLiter(s) IntraMuscular once  insulin lispro (ADMELOG) corrective regimen sliding scale   SubCutaneous every 6 hours  lisinopril 20 milliGRAM(s) Oral daily  metoprolol succinate ER 12.5 milliGRAM(s) Oral daily  metroNIDAZOLE  IVPB 500 milliGRAM(s) IV Intermittent every 8 hours  ondansetron Injectable 4 milliGRAM(s) IV Push every 8 hours PRN  traMADol 50 milliGRAM(s) Oral once PRN      PMHX/PSHX:  Diabetes mellitus    Hyperlipidemia    Hypertension    S/P cholecystectomy        Family history:  No pertinent family history in first degree relatives        Social History:   no etoh no cigs no ivda    ROS:     General:  No wt loss, fevers, chills, night sweats, fatigue,   Eyes:  Good vision, no reported pain  ENT:  No sore throat, pain, runny nose, dysphagia  CV:  No pain, palpitations, hypo/hypertension  Resp:  No dyspnea, cough, tachypnea, wheezing  GI:  No pain, No nausea, No vomiting, No diarrhea, No constipation, No weight loss, No fever, No pruritis, No rectal bleeding, No tarry stools, No dysphagia,  :  No pain, bleeding, incontinence, nocturia  Muscle:  No pain, weakness  Neuro:  No weakness, tingling, memory problems  Psych:  No fatigue, insomnia, mood problems, depression  Endocrine:  No polyuria, polydipsia, cold/heat intolerance  Heme:  No petechiae, ecchymosis, easy bruisability  Skin:  No rash, tattoos, scars, edema      PHYSICAL EXAM:   Vital Signs:  Vital Signs Last 24 Hrs  T(C): 36.7 (05 Dec 2022 12:42), Max: 37 (04 Dec 2022 21:24)  T(F): 98 (05 Dec 2022 12:42), Max: 98.6 (04 Dec 2022 21:24)  HR: 75 (05 Dec 2022 12:42) (70 - 85)  BP: 135/80 (05 Dec 2022 12:42) (135/80 - 190/84)  BP(mean): --  RR: 18 (05 Dec 2022 12:42) (16 - 18)  SpO2: 95% (05 Dec 2022 12:42) (95% - 100%)    Parameters below as of 05 Dec 2022 12:42  Patient On (Oxygen Delivery Method): room air      Daily     Daily     GENERAL:  Appears stated age, well-groomed, well-nourished, no distress  HEENT:  NC/AT,  conjunctivae clear and pink, no thyromegaly, nodules, adenopathy, no JVD, sclera -anicteric  CHEST:  Full & symmetric excursion, no increased effort, breath sounds clear  HEART:  Regular rhythm, S1, S2, no murmur/rub/S3/S4, no abdominal bruit, no edema  ABDOMEN:  Soft, non-tender, non-distended, normoactive bowel sounds,  no masses ,no hepato-splenomegaly, no signs of chronic liver disease  EXTEREMITIES:  no cyanosis,clubbing or edema  SKIN:  No rash/erythema/ecchymoses/petechiae/wounds/abscess/warm/dry  NEURO:  Alert, oriented, no asterixis, no tremor, no encephalopathy    LABS:                        12.3   8.49  )-----------( 276      ( 05 Dec 2022 05:37 )             39.5     12    140  |  109<H>  |  14  ----------------------------<  135<H>  4.0   |  23  |  1.10    Ca    8.6      05 Dec 2022 05:37  Phos  2.5     12-  Mg     2.1     -    TPro  6.8  /  Alb  2.5<L>  /  TBili  0.4  /  DBili  x   /  AST  16  /  ALT  42  /  AlkPhos  132<H>  12-    LIVER FUNCTIONS - ( 05 Dec 2022 05:37 )  Alb: 2.5 g/dL / Pro: 6.8 g/dL / ALK PHOS: 132 U/L / ALT: 42 U/L / AST: 16 U/L / GGT: x           PT/INR - ( 04 Dec 2022 08:30 )   PT: 11.6 sec;   INR: 0.99 ratio         PTT - ( 04 Dec 2022 08:30 )  PTT:27.7 sec  Urinalysis Basic - ( 05 Dec 2022 08:11 )    Color: Yellow / Appearance: Clear / S.010 / pH: x  Gluc: x / Ketone: Negative  / Bili: Negative / Urobili: Negative   Blood: x / Protein: Negative / Nitrite: Negative   Leuk Esterase: Negative / RBC: x / WBC x   Sq Epi: x / Non Sq Epi: x / Bacteria: x          Imaging:

## 2022-12-04 NOTE — CONSULT NOTE ADULT - ASSESSMENT
dilated cbd  ? cbd stone  sepsis    plan  watch lfts  may need mrcp/ercp if no source is found  ppi once a day  ivf  iv abs    Advanced care planning was discussed with patient and family.  Advanced care planning forms were reviewed and discussed.  Risks, benefits and alternatives of gastroenterologic procedures were discussed in detail and all questions were answered.    30 minutes spent.

## 2022-12-04 NOTE — ED ADULT NURSE NOTE - OBJECTIVE STATEMENT
pt aox4, " had gallbladder surgery a week ago abd discomfort,  fever, cough since last night" no n/v, no sob pt aox2, Hx dementia, " By daughter  had gallbladder surgery a week ago abd discomfort,  fever, cough since last night" no n/v, no sob, B/L lower leg swollen, redness

## 2022-12-04 NOTE — H&P ADULT - NSHPREVIEWOFSYSTEMS_GEN_ALL_CORE
ROS LIMITED 2/2 MENTAL STATUS, PATIENT FORGETFUL    Constitutional: denies fever, chills, sweating  Respiratory: denies SOB, cough, or wheezing  Cardiovascular: denies CP  Gastrointestinal: denies nausea, vomiting, diarrhea, constipation, abdominal pain, or bloody stools  Genitourinary: denies painful urination, increased frequency, urgency, or bloody urine  Musculoskeletal: denies muscle aches, joint swelling, or muscle weakness  Neurologic: denies loss of sensation, numbness, or tingling  ROS negative except as noted above ROS LIMITED 2/2 MENTAL STATUS, PATIENT FORGETFUL    Constitutional: denies fever, chills, sweating  Respiratory: denies SOB, cough, or wheezing  Cardiovascular: denies CP  Gastrointestinal: denies nausea, vomiting, diarrhea, constipation, bloody stools. Admits abdominal pain now resolved  Genitourinary: denies painful urination, increased frequency, urgency, or bloody urine  Musculoskeletal: denies muscle aches, joint swelling, or muscle weakness  Neurologic: denies loss of sensation, numbness, or tingling  ROS negative except as noted above

## 2022-12-04 NOTE — H&P ADULT - PROBLEM SELECTOR PLAN 9
Lovenox 40mg qD        #DISPO  PT consult - f/u Med rec performed  Patient seemingly on Metoprolol, Myrbetriq, Tramadol, Rosuvastatin, Duloxetine, Lisinopril  Some meds with sub 30 day supply, some meds 30 day supply last filled > 30 days ago  Team taking over care 12/5 d/w pharmacy official med rec      #MED REC NEEDED  Myrbetriq (overactive bladder?) - not on formulary

## 2022-12-04 NOTE — H&P ADULT - HISTORY OF PRESENT ILLNESS
IN THE ED:  Temp  98.2 F , HR 72 ,  / 60 ,RR 18 , SpO2 97 on RA  S/P: Lasix 40 IVP, 1 x IV azithro, 1 x IV rocephin  EKG:  Labs significant for Glucose 155, albumin 2.4, Alk phos 139, procal .25, BNP 2037  Imaging:  CXR: The lungs are clear with parenchymal density at the left CP angle that may reflect focus of atelectasis  Duplex u/s b/l LE - No evidence of deep venous thrombosis in either lower extremity  CT PE Chest + Abd and pelvis: Main pulmonary artery enlargement may reflect pulmonary arterial hypertension. Elevated left hemidiaphragm and partial left lower lobe atelectasis. Superimposed infection cannot be excluded. Follow to resolution with repeat chest CT in one month, or sooner as clinically warranted. Cholecystectomy. No loculated fluid collection along the gallbladder   fossa. Mild central biliary distention may be due to postcholecystectomy status. 76 y/o F PMHx of dementia, diabetes?, HTN, HLD s/p cholecystectomy 1 week ago w/ otherwise unknown medical history presented to the ED by ambulance. History obtained through chart review and patient. Patient is a poor historian and daughter called twice but unable to be reached. Patient allegedly brought in from home for fever (not documented in ED), abd discomfort, and cough since last night 12/3. During interview patient only endorsing lower abdominal pain, patient visibly comfortable and in NAD though rates pain 7/10. Allegedly patient off Lasix for 2 weeks due to prescription issue, no antipyretic used prior to admission. Patient does not endorse any other symptoms.    IN THE ED:  Temp  98.2 F , HR 72 ,  / 60 ,RR 18 , SpO2 97 on RA  S/P: Lasix 40 IVP, 1 x IV azithro, 1 x IV rocephin  EKG: Rate 68, ST&T wave abnormality, likely poor EKG  Labs significant for Glucose 155, albumin 2.4, Alk phos 139, procal .25, BNP 2037  Imaging:  CXR: The lungs are clear with parenchymal density at the left CP angle that may reflect focus of atelectasis  Duplex u/s b/l LE - No evidence of deep venous thrombosis in either lower extremity  CT PE Chest + Abd and pelvis: Main pulmonary artery enlargement may reflect pulmonary arterial hypertension. Elevated left hemidiaphragm and partial left lower lobe atelectasis. Superimposed infection cannot be excluded. Follow to resolution with repeat chest CT in one month, or sooner as clinically warranted. Cholecystectomy. No loculated fluid collection along the gallbladder   fossa. Mild central biliary distention may be due to postcholecystectomy status.

## 2022-12-04 NOTE — PHARMACOTHERAPY INTERVENTION NOTE - COMMENTS
Recommended to order a Legionella urinary antigen since patient has been empirically started on azithromycin for the treatment of pneumonia and can potentially help de-escalate antibiotics.    Darin Askew, PharmD, [facility]  Clinical Pharmacy Specialist, Infectious Diseases  Tele-Antimicrobial Stewardship Program (Tele-ASP)  Tele-ASP Phone: [phone]   Recommended to order a Legionella urinary antigen since patient has been empirically started on azithromycin for the treatment of pneumonia and can potentially help de-escalate antibiotics.    Darin Askew, PharmD, [facility]  Clinical Pharmacy Specialist, Infectious Diseases  Tele-Antimicrobial Stewardship Program (Tele-ASP)  Tele-ASP Phone: [phone]   Recommended to order a Legionella urinary antigen since patient has been empirically started on azithromycin for the treatment of pneumonia and can potentially help de-escalate antibiotics.    Darin Askew, PharmD, [facility]  Clinical Pharmacy Specialist, Infectious Diseases  Tele-Antimicrobial Stewardship Program (Tele-ASP)  Tele-ASP Phone: [phone]

## 2022-12-04 NOTE — H&P ADULT - ATTENDING COMMENTS
78 y/o F PMHx of dementia, diabetes?, HTN, HLD s/p cholecystectomy 1 week ago w/ otherwise unknown medical history presented to the ED by ambulance for fever at home, abd discomfort/pain, and cough since last night 12/3 with CT showing mild central biliary distention admitted for possible choledocholithiasis s/p lap cholecystectomy and apparent volume overload.    HPI as above.     T(C): 36.8 (12-04-22 @ 12:30), Max: 37.1 (12-04-22 @ 08:00)  HR: 72 (12-04-22 @ 12:30) (72 - 81)  BP: 150/60 (12-04-22 @ 12:30) (150/60 - 163/63)  RR: 18 (12-04-22 @ 12:30) (18 - 18)  SpO2: 97% (12-04-22 @ 12:30) (97% - 97%)  Wt(kg): --    Physical Exam:   GENERAL: well-groomed, well-developed, NAD  HEENT: head NC/AT; EOM intact, PERRLA, conjunctiva & sclera clear; hearing grossly intact, moist mucous membranes  NECK: supple, no JVD  RESPIRATORY: CTA B/L, no wheezing, rales, rhonchi or rubs  CARDIOVASCULAR: S1&S2, RRR, no murmurs or gallops  ABDOMEN: soft, non-tender, non-distended, + Bowel sounds x4 quadrants, no guarding, rebound or rigidity. laparoscopic sites without erythema or drainange  MUSCULOSKELETAL:  b/l LE edema and erythema  LYMPH: no cervical lymphadenopathy  VASCULAR: Radial pulses 2+ bilaterally, no varicose veins   SKIN: warm and dry, color normal  NEUROLOGIC: speech fluent, MAEx4  Psych: Normal mood and affect, normal behavior    continue IV abx  -concern for CBD stone  -GI consult  -NPO  -may have PNA and superimposed cellulitis 76 y/o F PMHx of dementia, diabetes?, HTN, HLD s/p cholecystectomy 1 week ago w/ otherwise unknown medical history presented to the ED by ambulance for fever at home, abd discomfort/pain, and cough since last night 12/3 with CT showing mild central biliary distention admitted for possible choledocholithiasis s/p lap cholecystectomy and apparent volume overload.    HPI as above.     T(C): 36.8 (12-04-22 @ 12:30), Max: 37.1 (12-04-22 @ 08:00)  HR: 72 (12-04-22 @ 12:30) (72 - 81)  BP: 150/60 (12-04-22 @ 12:30) (150/60 - 163/63)  RR: 18 (12-04-22 @ 12:30) (18 - 18)  SpO2: 97% (12-04-22 @ 12:30) (97% - 97%)  Wt(kg): --    Physical Exam:   GENERAL: well-groomed, well-developed, NAD  HEENT: head NC/AT; EOM intact, PERRLA, conjunctiva & sclera clear; hearing grossly intact, moist mucous membranes  NECK: supple, no JVD  RESPIRATORY: CTA B/L, no wheezing, rales, rhonchi or rubs  CARDIOVASCULAR: S1&S2, RRR, no murmurs or gallops  ABDOMEN: soft, non-tender, non-distended, + Bowel sounds x4 quadrants, no guarding, rebound or rigidity. laparoscopic sites without erythema or drainange  MUSCULOSKELETAL:  b/l LE edema and erythema  LYMPH: no cervical lymphadenopathy  VASCULAR: Radial pulses 2+ bilaterally, no varicose veins   SKIN: warm and dry, color normal  NEUROLOGIC: speech fluent, MAEx4  Psych: Normal mood and affect, normal behavior    continue IV abx  -concern for CBD stone  -GI consult  -NPO  -may have PNA and superimposed cellulitis  -will place on Rocephin, azithro and Flagyl -has PCN allergy    remainder as above 78 y/o F PMHx of dementia, diabetes?, HTN, HLD s/p cholecystectomy 1 week ago w/ otherwise unknown medical history presented to the ED by ambulance for fever at home, abd discomfort/pain, and cough since last night 12/3 with CT showing mild central biliary distention admitted for possible choledocholithiasis s/p lap cholecystectomy and apparent volume overload.    HPI as above.     T(C): 36.8 (12-04-22 @ 12:30), Max: 37.1 (12-04-22 @ 08:00)  HR: 72 (12-04-22 @ 12:30) (72 - 81)  BP: 150/60 (12-04-22 @ 12:30) (150/60 - 163/63)  RR: 18 (12-04-22 @ 12:30) (18 - 18)  SpO2: 97% (12-04-22 @ 12:30) (97% - 97%)  Wt(kg): --    Physical Exam:   GENERAL: well-groomed, well-developed, NAD  HEENT: head NC/AT; EOM intact, PERRLA, conjunctiva & sclera clear; hearing grossly intact, moist mucous membranes  NECK: supple, no JVD  RESPIRATORY: CTA B/L, no wheezing, rales, rhonchi or rubs  CARDIOVASCULAR: S1&S2, RRR, no murmurs or gallops  ABDOMEN: soft, non-tender, non-distended, + Bowel sounds x4 quadrants, no guarding, rebound or rigidity. laparoscopic sites without erythema or drainange  MUSCULOSKELETAL:  b/l LE edema and erythema  LYMPH: no cervical lymphadenopathy  VASCULAR: Radial pulses 2+ bilaterally, no varicose veins   SKIN: warm and dry, color normal  NEUROLOGIC: speech fluent, MAEx4  Psych: Normal mood and affect, normal behavior    continue IV abx  -concern for CBD stone  -GI consult  -NPO  -may have PNA and superimposed cellulitis  -will place on Rocephin, azithro and Flagyl -has PCN allergy    remainder as above

## 2022-12-04 NOTE — H&P ADULT - PROBLEM SELECTOR PLAN 2
1 day of SOB, significant b/l LE edema and ankle erythema w/ warmth  Duplex u/s b/l LE - No evidence of deep venous thrombosis in either lower extremity  Per hx patient recently d/c lasix for about 2 weeks though on personal med rec, no lasix detailed  Diurese and restart metoprolol and lisinopril w/ hold parameters  TTE  Cardiology consulted, f/u recs  Monitor hemodynamics 1 day of SOB, significant b/l LE edema and ankle erythema w/ warmth  BNP 2037  Duplex u/s b/l LE - No evidence of deep venous thrombosis in either lower extremity  Per hx patient recently d/c lasix for about 2 weeks though on personal med rec, no lasix detailed  Diurese and restart metoprolol and lisinopril w/ hold parameters  TTE  Cardiology consulted, f/u recs  Monitor hemodynamics 1 day of SOB, significant b/l LE edema and ankle erythema w/ warmth  BNP 2037, CT chest suggestive of pHTN  Duplex u/s b/l LE - No evidence of deep venous thrombosis in either lower extremity  Per hx patient discontinued lasix for about 2 weeks ago though on personal med rec, no lasix detailed  S/p Lasix 40 mg IV x 1 in ED, plan for AM dose  Restart metoprolol and lisinopril w/ hold parameters  TTE  Cardiology consulted, recs appreciated  Monitor hemodynamics 1 day of SOB, significant b/l LE edema and ankle erythema w/ warmth  BNP 2037, CT chest suggestive of pHTN  Duplex u/s b/l LE - No evidence of deep venous thrombosis in either lower extremity  Per hx patient dc'd lasix ~ 2 weeks ago though on personal med rec no Lasix reported  S/p Lasix 40 mg IV x 1 in ED, plan for AM dose per cardiology  Restart metoprolol and lisinopril w/ hold parameters  TTE - f/u  Cardiology consulted, recs appreciated  Monitor hemodynamics 1 day of SOB, significant b/l LE edema and ankle erythema w/ warmth  BNP 2037, CT chest suggestive of pHTN  Duplex u/s b/l LE - No evidence of deep venous thrombosis in either lower extremity  Per hx patient dc'd lasix ~ 2 weeks ago though on personal med rec no Lasix reported  S/p Lasix 40 mg IV x 1 in ED, will start lasix 40mg IV daily  Restart metoprolol and lisinopril w/ hold parameters  TTE - f/u  Cardiology consulted, recs appreciated  Monitor hemodynamics

## 2022-12-04 NOTE — H&P ADULT - PROBLEM SELECTOR PLAN 5
Endorses hx of diabetes  Not on home insulin  AM A1C  Finger sticks, Consistent carb diet  Hypoglycemic protocol Patient hypertensive on admission  Continue home lisinopril w/ hold parameters

## 2022-12-04 NOTE — ED ADULT NURSE NOTE - NSIMPLEMENTINTERV_GEN_ALL_ED
Implemented All Fall Risk Interventions:  [place] to call system. Call bell, personal items and telephone within reach. Instruct patient to call for assistance. Room bathroom lighting operational. Non-slip footwear when patient is off stretcher. Physically safe environment: no spills, clutter or unnecessary equipment. Stretcher in lowest position, wheels locked, appropriate side rails in place. Provide visual cue, wrist band, yellow gown, etc. Monitor gait and stability. Monitor for mental status changes and reorient to person, place, and time. Review medications for side effects contributing to fall risk. Reinforce activity limits and safety measures with patient and family. Implemented All Fall Risk Interventions:  [place] to call system. Call bell, personal items and telephone within reach. Instruct patient to call for assistance. Room bathroom lighting operational. Non-slip footwear when patient is off stretcher. Physically safe environment: no spills, clutter or unnecessary equipment. Stretcher in lowest position, wheels locked, appropriate side rails in place. Provide visual cue, wrist band, yellow gown, etc. Monitor gait and stability. Monitor for mental status changes and reorient to person, place, and time. Review medications for side effects contributing to fall risk. Reinforce activity limits and safety measures with patient and family. Implemented All Fall Risk Interventions:  [place] to call system. Call bell, personal items and telephone within reach. Instruct patient to call for assistance. Room bathroom lighting operational. Non-slip footwear when patient is off stretcher. Physically safe environment: no spills, clutter or unnecessary equipment. Stretcher in lowest position, wheels locked, appropriate side rails in place. Provide visual cue, wrist band, yellow gown, etc. Monitor gait and stability. Monitor for mental status changes and reorient to person, place, and time. Review medications for side effects contributing to fall risk. Reinforce activity limits and safety measures with patient and family.

## 2022-12-04 NOTE — CONSULT NOTE ADULT - SUBJECTIVE AND OBJECTIVE BOX
[facility] - Ramón Rouse Patel, Goodger, Savella  Office Number: [phone]    Initial Consult Note:     CHIEF COMPLAINT: Patient is a 77y old  Female who presents with a chief complaint of Fever (04 Dec 2022 14:20)    HPI:    IN THE ED:  Temp  98.2 F , HR 72 ,  / 60 ,RR 18 , SpO2 97 on RA  S/P: Lasix 40 IVP, 1 x IV azithro, 1 x IV rocephin  EKG:  Labs significant for Glucose 155, albumin 2.4, Alk phos 139, procal .25, BNP 2037  Imaging:  CXR: The lungs are clear with parenchymal density at the left CP angle that may reflect focus of atelectasis  Duplex u/s b/l LE - No evidence of deep venous thrombosis in either lower extremity  CT PE Chest + Abd and pelvis: Main pulmonary artery enlargement may reflect pulmonary arterial hypertension. Elevated left hemidiaphragm and partial left lower lobe atelectasis. Superimposed infection cannot be excluded. Follow to resolution with repeat chest CT in one month, or sooner as clinically warranted. Cholecystectomy. No loculated fluid collection along the gallbladder   fossa. Mild central biliary distention may be due to postcholecystectomy status. (04 Dec 2022 14:20)    PAST MEDICAL & SURGICAL HISTORY:  S/P cholecystectomy  11/2022    SOCIAL HISTORY:  No tobacco, ethanol, or drug abuse.  FAMILY HISTORY:    No family history of acute MI or sudden cardiac death.  MEDICATIONS  (STANDING):    MEDICATIONS  (PRN):    Allergies    penicillin (Hives)    Intolerances      REVIEW OF SYSTEMS:  CONSTITUTIONAL: No weakness, fevers or chills  EYES/ENT: No visual changes;  No vertigo or throat pain   NECK: No pain or stiffness  RESPIRATORY: No cough, wheezing, hemoptysis; No shortness of breath  CARDIOVASCULAR: No chest pain or palpitations  GASTROINTESTINAL: No abdominal pain. No nausea, vomiting, or hematemesis; No diarrhea or constipation. No melena or hematochezia.  GENITOURINARY: No dysuria, frequency or hematuria  NEUROLOGICAL: No numbness or weakness  SKIN: No itching or rash  All other review of systems is negative unless indicated above  VITAL SIGNS:   Vital Signs Last 24 Hrs  T(C): 36.8 (04 Dec 2022 12:30), Max: 37.1 (04 Dec 2022 08:00)  T(F): 98.2 (04 Dec 2022 12:30), Max: 98.8 (04 Dec 2022 08:00)  HR: 72 (04 Dec 2022 12:30) (72 - 81)  BP: 150/60 (04 Dec 2022 12:30) (150/60 - 163/63)  BP(mean): --  RR: 18 (04 Dec 2022 12:30) (18 - 18)  SpO2: 97% (04 Dec 2022 12:30) (97% - 97%)    Parameters below as of 04 Dec 2022 12:30  Patient On (Oxygen Delivery Method): room air      I&O's Summary    On Exam:  Constitutional: NAD, alert and oriented x 3  Lungs:  Non-labored, breath sounds are clear bilaterally, No wheezing, rales or rhonchi  Cardiovascular: RRR.  S1 and S2 positive.  No murmurs, rubs, gallops or clicks  Gastrointestinal: Bowel Sounds present, soft, nontender.   Lymph: No peripheral edema. No cervical lymphadenopathy.  Neurological: Alert, no focal deficits  Skin: No rashes or ulcers   Psych:  Mood & affect appropriate.    LABS: All Labs Reviewed:                        12.2   10.08 )-----------( 292      ( 04 Dec 2022 08:30 )             37.3     04 Dec 2022 08:30    142    |  111    |  17     ----------------------------<  155    4.4     |  24     |  1.20     Ca    8.7        04 Dec 2022 08:30  Mg     2.2       04 Dec 2022 08:30    TPro  6.4    /  Alb  2.4    /  TBili  0.4    /  DBili  x      /  AST  17     /  ALT  47     /  AlkPhos  139    04 Dec 2022 08:30    PT/INR - ( 04 Dec 2022 08:30 )   PT: 11.6 sec;   INR: 0.99 ratio         PTT - ( 04 Dec 2022 08:30 )  PTT:27.7 sec      Blood Culture:   12-04 @ 08:30  Pro Bnp 2037        RADIOLOGY:    EKG:    Assessment/Plan:  77y Female    Galenabelem Flood DNP, ANP-C  Cardiology  Spectra #[phone]       [facility] - Ramón Rouse Patel, Goodger, Savella  Office Number: [phone]    Initial Consult Note:     CHIEF COMPLAINT: Patient is a 77y old  Female who presents with a chief complaint of Fever (04 Dec 2022 14:20)    HPI:    IN THE ED:  Temp  98.2 F , HR 72 ,  / 60 ,RR 18 , SpO2 97 on RA  S/P: Lasix 40 IVP, 1 x IV azithro, 1 x IV rocephin  EKG:  Labs significant for Glucose 155, albumin 2.4, Alk phos 139, procal .25, BNP 2037  Imaging:  CXR: The lungs are clear with parenchymal density at the left CP angle that may reflect focus of atelectasis  Duplex u/s b/l LE - No evidence of deep venous thrombosis in either lower extremity  CT PE Chest + Abd and pelvis: Main pulmonary artery enlargement may reflect pulmonary arterial hypertension. Elevated left hemidiaphragm and partial left lower lobe atelectasis. Superimposed infection cannot be excluded. Follow to resolution with repeat chest CT in one month, or sooner as clinically warranted. Cholecystectomy. No loculated fluid collection along the gallbladder   fossa. Mild central biliary distention may be due to postcholecystectomy status. (04 Dec 2022 14:20)    PAST MEDICAL & SURGICAL HISTORY:  S/P cholecystectomy  11/2022    SOCIAL HISTORY:  No tobacco, ethanol, or drug abuse.  FAMILY HISTORY:    No family history of acute MI or sudden cardiac death.  MEDICATIONS  (STANDING):    MEDICATIONS  (PRN):    Allergies    penicillin (Hives)    Intolerances      REVIEW OF SYSTEMS:  CONSTITUTIONAL: No weakness, fevers or chills  EYES/ENT: No visual changes;  No vertigo or throat pain   NECK: No pain or stiffness  RESPIRATORY: No cough, wheezing, hemoptysis; No shortness of breath  CARDIOVASCULAR: No chest pain or palpitations  GASTROINTESTINAL: No abdominal pain. No nausea, vomiting, or hematemesis; No diarrhea or constipation. No melena or hematochezia.  GENITOURINARY: No dysuria, frequency or hematuria  NEUROLOGICAL: No numbness or weakness  SKIN: No itching or rash  All other review of systems is negative unless indicated above  VITAL SIGNS:   Vital Signs Last 24 Hrs  T(C): 36.8 (04 Dec 2022 12:30), Max: 37.1 (04 Dec 2022 08:00)  T(F): 98.2 (04 Dec 2022 12:30), Max: 98.8 (04 Dec 2022 08:00)  HR: 72 (04 Dec 2022 12:30) (72 - 81)  BP: 150/60 (04 Dec 2022 12:30) (150/60 - 163/63)  BP(mean): --  RR: 18 (04 Dec 2022 12:30) (18 - 18)  SpO2: 97% (04 Dec 2022 12:30) (97% - 97%)    Parameters below as of 04 Dec 2022 12:30  Patient On (Oxygen Delivery Method): room air      I&O's Summary    On Exam:  Constitutional: NAD, alert and oriented x 3  Lungs:  Non-labored, breath sounds are clear bilaterally, No wheezing, rales or rhonchi  Cardiovascular: RRR.  S1 and S2 positive.  No murmurs, rubs, gallops or clicks  Gastrointestinal: Bowel Sounds present, soft, nontender.   Lymph: No peripheral edema. No cervical lymphadenopathy.  Neurological: Alert, no focal deficits  Skin: No rashes or ulcers   Psych:  Mood & affect appropriate.    LABS: All Labs Reviewed:                        12.2   10.08 )-----------( 292      ( 04 Dec 2022 08:30 )             37.3     04 Dec 2022 08:30    142    |  111    |  17     ----------------------------<  155    4.4     |  24     |  1.20     Ca    8.7        04 Dec 2022 08:30  Mg     2.2       04 Dec 2022 08:30    TPro  6.4    /  Alb  2.4    /  TBili  0.4    /  DBili  x      /  AST  17     /  ALT  47     /  AlkPhos  139    04 Dec 2022 08:30    PT/INR - ( 04 Dec 2022 08:30 )   PT: 11.6 sec;   INR: 0.99 ratio         PTT - ( 04 Dec 2022 08:30 )  PTT:27.7 sec      Blood Culture:   12-04 @ 08:30  Pro Bnp 2037        RADIOLOGY:    EKG:    Assessment/Plan:  77y Female    [place] Flood DNP, ANP-C  Cardiology  Spectra #[phone]       [facility] - Ramón Rouse Patel, Goodger, Savella  Office Number: [phone]    Initial Consult Note:     CHIEF COMPLAINT: Patient is a 77y old  Female who presents with a chief complaint of Fever (04 Dec 2022 14:20)    HPI:    IN THE ED:  Temp  98.2 F , HR 72 ,  / 60 ,RR 18 , SpO2 97 on RA  S/P: Lasix 40 IVP, 1 x IV azithro, 1 x IV rocephin  EKG:  Labs significant for Glucose 155, albumin 2.4, Alk phos 139, procal .25, BNP 2037  Imaging:  CXR: The lungs are clear with parenchymal density at the left CP angle that may reflect focus of atelectasis  Duplex u/s b/l LE - No evidence of deep venous thrombosis in either lower extremity  CT PE Chest + Abd and pelvis: Main pulmonary artery enlargement may reflect pulmonary arterial hypertension. Elevated left hemidiaphragm and partial left lower lobe atelectasis. Superimposed infection cannot be excluded. Follow to resolution with repeat chest CT in one month, or sooner as clinically warranted. Cholecystectomy. No loculated fluid collection along the gallbladder   fossa. Mild central biliary distention may be due to postcholecystectomy status. (04 Dec 2022 14:20)    PAST MEDICAL & SURGICAL HISTORY:  S/P cholecystectomy  11/2022    SOCIAL HISTORY:  No tobacco, ethanol, or drug abuse.  FAMILY HISTORY:    No family history of acute MI or sudden cardiac death.  MEDICATIONS  (STANDING):    MEDICATIONS  (PRN):    Allergies    penicillin (Hives)    Intolerances      REVIEW OF SYSTEMS:  CONSTITUTIONAL: No weakness, fevers or chills  EYES/ENT: No visual changes;  No vertigo or throat pain   NECK: No pain or stiffness  RESPIRATORY: No cough, wheezing, hemoptysis; No shortness of breath  CARDIOVASCULAR: No chest pain or palpitations  GASTROINTESTINAL: No abdominal pain. No nausea, vomiting, or hematemesis; No diarrhea or constipation. No melena or hematochezia.  GENITOURINARY: No dysuria, frequency or hematuria  NEUROLOGICAL: No numbness or weakness  SKIN: No itching or rash  All other review of systems is negative unless indicated above  VITAL SIGNS:   Vital Signs Last 24 Hrs  T(C): 36.8 (04 Dec 2022 12:30), Max: 37.1 (04 Dec 2022 08:00)  T(F): 98.2 (04 Dec 2022 12:30), Max: 98.8 (04 Dec 2022 08:00)  HR: 72 (04 Dec 2022 12:30) (72 - 81)  BP: 150/60 (04 Dec 2022 12:30) (150/60 - 163/63)  BP(mean): --  RR: 18 (04 Dec 2022 12:30) (18 - 18)  SpO2: 97% (04 Dec 2022 12:30) (97% - 97%)    Parameters below as of 04 Dec 2022 12:30  Patient On (Oxygen Delivery Method): room air      I&O's Summary    On Exam:  Constitutional: NAD, alert and oriented x 3  Lungs:  Non-labored, breath sounds are clear bilaterally, No wheezing, rales or rhonchi  Cardiovascular: RRR.  S1 and S2 positive.  No murmurs, rubs, gallops or clicks  Gastrointestinal: Bowel Sounds present, soft, nontender.   Lymph: No peripheral edema. No cervical lymphadenopathy.  Neurological: Alert, no focal deficits  Skin: No rashes or ulcers   Psych:  Mood & affect appropriate.    LABS: All Labs Reviewed:                        12.2   10.08 )-----------( 292      ( 04 Dec 2022 08:30 )             37.3     04 Dec 2022 08:30    142    |  111    |  17     ----------------------------<  155    4.4     |  24     |  1.20     Ca    8.7        04 Dec 2022 08:30  Mg     2.2       04 Dec 2022 08:30    TPro  6.4    /  Alb  2.4    /  TBili  0.4    /  DBili  x      /  AST  17     /  ALT  47     /  AlkPhos  139    04 Dec 2022 08:30    PT/INR - ( 04 Dec 2022 08:30 )   PT: 11.6 sec;   INR: 0.99 ratio         PTT - ( 04 Dec 2022 08:30 )  PTT:27.7 sec      Blood Culture:   12-04 @ 08:30  Pro Bnp 2037        RADIOLOGY:    EKG:    Assessment/Plan:  77y Female    [place] Centerbelem Flood DNP, ANP-C  Cardiology  Spectra #[phone]       [facility] - Ramón Rouse Patel, Goodger, Savella  Office Number: [phone]    Initial Consult Note: This is an obese 76 y/o F with no cardiac Hx, s/p cholecystectomy 1 week ago presented to the ED c/o fever and some abdominal tenderness, found to have main pulmonary enlargement and possible volume overload.  Also noted to have possible cellulitis with superimposed edema of BLE.    CHIEF COMPLAINT: Patient is a 77y old  Female who presents with a chief complaint of Fever (04 Dec 2022 14:20)    HPI:     IN THE ED:  Temp  98.2 F , HR 72 ,  / 60 ,RR 18 , SpO2 97 on RA  S/P: Lasix 40 IVP, 1 x IV azithro, 1 x IV rocephin  EKG:  Labs significant for Glucose 155, albumin 2.4, Alk phos 139, procal .25, BNP 2037  Imaging:  CXR: The lungs are clear with parenchymal density at the left CP angle that may reflect focus of atelectasis  Duplex u/s b/l LE - No evidence of deep venous thrombosis in either lower extremity  CT PE Chest + Abd and pelvis: Main pulmonary artery enlargement may reflect pulmonary arterial hypertension. Elevated left hemidiaphragm and partial left lower lobe atelectasis. Superimposed infection cannot be excluded. Follow to resolution with repeat chest CT in one month, or sooner as clinically warranted. Cholecystectomy. No loculated fluid collection along the gallbladder   fossa. Mild central biliary distention may be due to postcholecystectomy status. (04 Dec 2022 14:20)    PAST MEDICAL & SURGICAL HISTORY:  S/P cholecystectomy  11/2022    SOCIAL HISTORY:  No tobacco, ethanol, or drug abuse.  FAMILY HISTORY:    No family history of acute MI or sudden cardiac death.  MEDICATIONS  (STANDING):    MEDICATIONS  (PRN):    Allergies    penicillin (Hives)    Intolerances    REVIEW OF SYSTEMS:  CONSTITUTIONAL: No weakness, fevers or chills  EYES/ENT: No visual changes;  No vertigo or throat pain   NECK: No pain or stiffness  RESPIRATORY: No cough, wheezing, hemoptysis; No shortness of breath  CARDIOVASCULAR: No chest pain or palpitations  GASTROINTESTINAL: No abdominal pain. No nausea, vomiting, or hematemesis; No diarrhea or constipation. No melena or hematochezia.  GENITOURINARY: No dysuria, frequency or hematuria  NEUROLOGICAL: No numbness or weakness  SKIN: No itching or rash  All other review of systems is negative unless indicated above  VITAL SIGNS:   Vital Signs Last 24 Hrs  T(C): 36.8 (04 Dec 2022 12:30), Max: 37.1 (04 Dec 2022 08:00)  T(F): 98.2 (04 Dec 2022 12:30), Max: 98.8 (04 Dec 2022 08:00)  HR: 72 (04 Dec 2022 12:30) (72 - 81)  BP: 150/60 (04 Dec 2022 12:30) (150/60 - 163/63)  BP(mean): --  RR: 18 (04 Dec 2022 12:30) (18 - 18)  SpO2: 97% (04 Dec 2022 12:30) (97% - 97%)    Parameters below as of 04 Dec 2022 12:30  Patient On (Oxygen Delivery Method): room air      I&O's Summary    On Exam:  Constitutional: NAD, alert and oriented x 3, obese  Lungs:  Non-labored, breath sounds are clear but diminished bilaterally, No wheezing, rales or rhonchi  Cardiovascular: RRR.  S1 and S2 positive.  +murmurs ll/Vl, norubs, gallops or clicks  Gastrointestinal: Bowel Sounds present, soft, +tenderness.  Healing incisions from milind  Lymph: 1-2+ BLE edema. No cervical lymphadenopathy.  Neurological: Alert, no focal deficits  Skin: No rashes or ulcers.  +erythema BLE  Psych:  Mood & affect appropriate.    LABS: All Labs Reviewed:                        12.2   10.08 )-----------( 292      ( 04 Dec 2022 08:30 )             37.3     04 Dec 2022 08:30    142    |  111    |  17     ----------------------------<  155    4.4     |  24     |  1.20     Ca    8.7        04 Dec 2022 08:30  Mg     2.2       04 Dec 2022 08:30    TPro  6.4    /  Alb  2.4    /  TBili  0.4    /  DBili  x      /  AST  17     /  ALT  47     /  AlkPhos  139    04 Dec 2022 08:30    PT/INR - ( 04 Dec 2022 08:30 )   PT: 11.6 sec;   INR: 0.99 ratio     PTT - ( 04 Dec 2022 08:30 )  PTT:27.7 sec  Blood Culture:   12-04 @ 08:30  Pro Bnp 2037    RADIOLOGY:    ACC: [id number] EXAM:  CT ABDOMEN AND PELVIS IC                        ACC: [id number] EXAM:  CT ANGIO CHEST PULM ART [facility]                          PROCEDURE DATE:  12/04/2022          INTERPRETATION:  CLINICAL INFORMATION: Recent cholecystectomy, abdominal   pain, fever, shortness of breath, leg swelling.    COMPARISON: None.    CONTRAST/COMPLICATIONS:  IV Contrast: Omnipaque 350 (accession [id number]), IV contrast documented   in associated exam (accession [id number])  90 cc administered (accession   [id number]), 0 cc administered (accession [id number])   10 cc discarded   (accession [id number]), 0 cc discarded (accession [id number])  Oral Contrast: NONE  Complications: None reported at time of study completion    PROCEDURE:  CT Angiography of the Chestwas performed followed by portal venous phase   imaging of the Abdomen and Pelvis.  Sagittal and coronal reformats were performed as well as 3D (MIP)   reconstructions.    FINDINGS:    CHEST:  LUNGS AND LARGE AIRWAYS: Central airways are patent. Leftlower lobe   partial atelectasis. Superimposed infection cannot be excluded. Minimal   right dependent lung scarring.  PLEURA: No pleural effusion or pneumothorax.  VESSELS: No pulmonary embolus. Main pulmonary artery is enlarged,   measuring 3.3 cm.This may reflect pulmonary arterial hypertension.  HEART: Heart size is qualitatively within normal limits. Aortic valve and   mitral annular calcification. Coronary artery calcification. Trace   pericardial fluid.  MEDIASTINUM AND AIMEE: Small volumenodes or pneumothorax. Esophagus is   nondistended.  CHEST WALL AND LOWER NECK: Elevated left hemidiaphragm. No chest wall   hematoma. Visualized thyroid is unremarkable.    ABDOMEN AND PELVIS:  LIVER: Liver size is borderline enlarged, 18 cm in craniocaudal dimension.  BILE DUCTS: Mild central biliary distention, measuring up to 1 cm, which   may be reflective of post cholecystectomy status. Correlate with LFTs and   MRCP as warranted.  GALLBLADDER: Cholecystectomy. Trace fluid at the gallbladder fossa. No   loculated fluid collection.  SPLEEN: Spleen size within normal limits  PANCREAS: No acute peripancreatic inflammation  ADRENALS: Slight thickening of the adrenals.  KIDNEYS/URETERS: No hydronephrosis.    BLADDER: Mildly distended.  REPRODUCTIVE ORGANS: Hysterectomy.    BOWEL: Limited evaluation due to lack of oral contrast. Small to moderate   hiatal hernia. Stomach is underdistended. No small bowel distention. No   secondary sign of acute appendicitis. Mild stool burn of the colon limits   evaluation of the likely cause.  PERITONEUM: Trace fluid  VESSELS: No abdominal aortic aneurysm. Atheromatous changes.  RETROPERITONEUM/LYMPH NODES: Small volume nodes.  ABDOMINAL WALL: Postsurgical changes.  BONES: Osseous demineralization and multilevel degenerative changes of   bones. Scoliotic curvature of the spine. Central canal and neural   foramina are not adequately assessed on this study.    IMPRESSION:    CHEST:  1. No pulmonary embolus. Main pulmonary artery enlargement may reflect   pulmonary arterial hypertension.  2. Elevated left hemidiaphragm and partial left lower lobe atelectasis.   Superimposed infection cannot be excluded. Follow to resolution with   repeat chest CT in one month, or sooner as clinically warranted.  3. Aortic valve and coronary artery calcifications.    ABDOMEN/PELVIS:  1. Cholecystectomy. No loculated fluid collection along the gallbladder   fossa.  2. Mild central biliary distention may be due to postcholecystectomy   status. Correlate with LFTs and clinical status of the patient.    --- End of Report ---    SUREKHA YIN M.D., ATTENDING RADIOLOGIST  This document has been electronically signed. Dec  4 2022 12:17PM    EKG:   [facility] - Ramón Rouse Patel, Goodger, Savella  Office Number: [phone]    Initial Consult Note: This is an obese 78 y/o F with no cardiac Hx, s/p cholecystectomy 1 week ago presented to the ED c/o fever and some abdominal tenderness, found to have main pulmonary enlargement and possible volume overload.  Also noted to have possible cellulitis with superimposed edema of BLE.    CHIEF COMPLAINT: Patient is a 77y old  Female who presents with a chief complaint of Fever (04 Dec 2022 14:20)    HPI:     IN THE ED:  Temp  98.2 F , HR 72 ,  / 60 ,RR 18 , SpO2 97 on RA  S/P: Lasix 40 IVP, 1 x IV azithro, 1 x IV rocephin  EKG:  Labs significant for Glucose 155, albumin 2.4, Alk phos 139, procal .25, BNP 2037  Imaging:  CXR: The lungs are clear with parenchymal density at the left CP angle that may reflect focus of atelectasis  Duplex u/s b/l LE - No evidence of deep venous thrombosis in either lower extremity  CT PE Chest + Abd and pelvis: Main pulmonary artery enlargement may reflect pulmonary arterial hypertension. Elevated left hemidiaphragm and partial left lower lobe atelectasis. Superimposed infection cannot be excluded. Follow to resolution with repeat chest CT in one month, or sooner as clinically warranted. Cholecystectomy. No loculated fluid collection along the gallbladder   fossa. Mild central biliary distention may be due to postcholecystectomy status. (04 Dec 2022 14:20)    PAST MEDICAL & SURGICAL HISTORY:  S/P cholecystectomy  11/2022    SOCIAL HISTORY:  No tobacco, ethanol, or drug abuse.  FAMILY HISTORY:    No family history of acute MI or sudden cardiac death.  MEDICATIONS  (STANDING):    MEDICATIONS  (PRN):    Allergies    penicillin (Hives)    Intolerances    REVIEW OF SYSTEMS:  CONSTITUTIONAL: No weakness, fevers or chills  EYES/ENT: No visual changes;  No vertigo or throat pain   NECK: No pain or stiffness  RESPIRATORY: No cough, wheezing, hemoptysis; No shortness of breath  CARDIOVASCULAR: No chest pain or palpitations  GASTROINTESTINAL: No abdominal pain. No nausea, vomiting, or hematemesis; No diarrhea or constipation. No melena or hematochezia.  GENITOURINARY: No dysuria, frequency or hematuria  NEUROLOGICAL: No numbness or weakness  SKIN: No itching or rash  All other review of systems is negative unless indicated above  VITAL SIGNS:   Vital Signs Last 24 Hrs  T(C): 36.8 (04 Dec 2022 12:30), Max: 37.1 (04 Dec 2022 08:00)  T(F): 98.2 (04 Dec 2022 12:30), Max: 98.8 (04 Dec 2022 08:00)  HR: 72 (04 Dec 2022 12:30) (72 - 81)  BP: 150/60 (04 Dec 2022 12:30) (150/60 - 163/63)  BP(mean): --  RR: 18 (04 Dec 2022 12:30) (18 - 18)  SpO2: 97% (04 Dec 2022 12:30) (97% - 97%)    Parameters below as of 04 Dec 2022 12:30  Patient On (Oxygen Delivery Method): room air      I&O's Summary    On Exam:  Constitutional: NAD, alert and oriented x 3, obese  Lungs:  Non-labored, breath sounds are clear but diminished bilaterally, No wheezing, rales or rhonchi  Cardiovascular: RRR.  S1 and S2 positive.  +murmurs ll/Vl, norubs, gallops or clicks  Gastrointestinal: Bowel Sounds present, soft, +tenderness.  Healing incisions from milind  Lymph: 1-2+ BLE edema. No cervical lymphadenopathy.  Neurological: Alert, no focal deficits  Skin: No rashes or ulcers.  +erythema BLE  Psych:  Mood & affect appropriate.    LABS: All Labs Reviewed:                        12.2   10.08 )-----------( 292      ( 04 Dec 2022 08:30 )             37.3     04 Dec 2022 08:30    142    |  111    |  17     ----------------------------<  155    4.4     |  24     |  1.20     Ca    8.7        04 Dec 2022 08:30  Mg     2.2       04 Dec 2022 08:30    TPro  6.4    /  Alb  2.4    /  TBili  0.4    /  DBili  x      /  AST  17     /  ALT  47     /  AlkPhos  139    04 Dec 2022 08:30    PT/INR - ( 04 Dec 2022 08:30 )   PT: 11.6 sec;   INR: 0.99 ratio     PTT - ( 04 Dec 2022 08:30 )  PTT:27.7 sec  Blood Culture:   12-04 @ 08:30  Pro Bnp 2037    RADIOLOGY:    ACC: [id number] EXAM:  CT ABDOMEN AND PELVIS IC                        ACC: [id number] EXAM:  CT ANGIO CHEST PULM ART [facility]                          PROCEDURE DATE:  12/04/2022          INTERPRETATION:  CLINICAL INFORMATION: Recent cholecystectomy, abdominal   pain, fever, shortness of breath, leg swelling.    COMPARISON: None.    CONTRAST/COMPLICATIONS:  IV Contrast: Omnipaque 350 (accession [id number]), IV contrast documented   in associated exam (accession [id number])  90 cc administered (accession   [id number]), 0 cc administered (accession [id number])   10 cc discarded   (accession [id number]), 0 cc discarded (accession [id number])  Oral Contrast: NONE  Complications: None reported at time of study completion    PROCEDURE:  CT Angiography of the Chestwas performed followed by portal venous phase   imaging of the Abdomen and Pelvis.  Sagittal and coronal reformats were performed as well as 3D (MIP)   reconstructions.    FINDINGS:    CHEST:  LUNGS AND LARGE AIRWAYS: Central airways are patent. Leftlower lobe   partial atelectasis. Superimposed infection cannot be excluded. Minimal   right dependent lung scarring.  PLEURA: No pleural effusion or pneumothorax.  VESSELS: No pulmonary embolus. Main pulmonary artery is enlarged,   measuring 3.3 cm.This may reflect pulmonary arterial hypertension.  HEART: Heart size is qualitatively within normal limits. Aortic valve and   mitral annular calcification. Coronary artery calcification. Trace   pericardial fluid.  MEDIASTINUM AND AIMEE: Small volumenodes or pneumothorax. Esophagus is   nondistended.  CHEST WALL AND LOWER NECK: Elevated left hemidiaphragm. No chest wall   hematoma. Visualized thyroid is unremarkable.    ABDOMEN AND PELVIS:  LIVER: Liver size is borderline enlarged, 18 cm in craniocaudal dimension.  BILE DUCTS: Mild central biliary distention, measuring up to 1 cm, which   may be reflective of post cholecystectomy status. Correlate with LFTs and   MRCP as warranted.  GALLBLADDER: Cholecystectomy. Trace fluid at the gallbladder fossa. No   loculated fluid collection.  SPLEEN: Spleen size within normal limits  PANCREAS: No acute peripancreatic inflammation  ADRENALS: Slight thickening of the adrenals.  KIDNEYS/URETERS: No hydronephrosis.    BLADDER: Mildly distended.  REPRODUCTIVE ORGANS: Hysterectomy.    BOWEL: Limited evaluation due to lack of oral contrast. Small to moderate   hiatal hernia. Stomach is underdistended. No small bowel distention. No   secondary sign of acute appendicitis. Mild stool burn of the colon limits   evaluation of the likely cause.  PERITONEUM: Trace fluid  VESSELS: No abdominal aortic aneurysm. Atheromatous changes.  RETROPERITONEUM/LYMPH NODES: Small volume nodes.  ABDOMINAL WALL: Postsurgical changes.  BONES: Osseous demineralization and multilevel degenerative changes of   bones. Scoliotic curvature of the spine. Central canal and neural   foramina are not adequately assessed on this study.    IMPRESSION:    CHEST:  1. No pulmonary embolus. Main pulmonary artery enlargement may reflect   pulmonary arterial hypertension.  2. Elevated left hemidiaphragm and partial left lower lobe atelectasis.   Superimposed infection cannot be excluded. Follow to resolution with   repeat chest CT in one month, or sooner as clinically warranted.  3. Aortic valve and coronary artery calcifications.    ABDOMEN/PELVIS:  1. Cholecystectomy. No loculated fluid collection along the gallbladder   fossa.  2. Mild central biliary distention may be due to postcholecystectomy   status. Correlate with LFTs and clinical status of the patient.    --- End of Report ---    SUREKHA YIN M.D., ATTENDING RADIOLOGIST  This document has been electronically signed. Dec  4 2022 12:17PM    EKG:   [facility] - Ramón Rouse Patel, [place], [place]  Office Number: [phone]    Initial Consult Note: This is an obese 76 y/o F with no cardiac Hx, s/p cholecystectomy 1 week ago presented to the ED c/o fever and some abdominal tenderness, found to have main pulmonary enlargement and possible volume overload.  Also noted to have possible cellulitis with superimposed edema of BLE.    CHIEF COMPLAINT: Patient is a 77y old  Female who presents with a chief complaint of Fever (04 Dec 2022 14:20)    HPI:     IN THE ED:  Temp  98.2 F , HR 72 ,  / 60 ,RR 18 , SpO2 97 on RA  S/P: Lasix 40 IVP, 1 x IV azithro, 1 x IV rocephin  EKG:  Labs significant for Glucose 155, albumin 2.4, Alk phos 139, procal .25, BNP 2037  Imaging:  CXR: The lungs are clear with parenchymal density at the left CP angle that may reflect focus of atelectasis  Duplex u/s b/l LE - No evidence of deep venous thrombosis in either lower extremity  CT PE Chest + Abd and pelvis: Main pulmonary artery enlargement may reflect pulmonary arterial hypertension. Elevated left hemidiaphragm and partial left lower lobe atelectasis. Superimposed infection cannot be excluded. Follow to resolution with repeat chest CT in one month, or sooner as clinically warranted. Cholecystectomy. No loculated fluid collection along the gallbladder   fossa. Mild central biliary distention may be due to postcholecystectomy status. (04 Dec 2022 14:20)    PAST MEDICAL & SURGICAL HISTORY:  S/P cholecystectomy  11/2022    SOCIAL HISTORY:  No tobacco, ethanol, or drug abuse.  FAMILY HISTORY:    No family history of acute MI or sudden cardiac death.  MEDICATIONS  (STANDING):    MEDICATIONS  (PRN):    Allergies    penicillin (Hives)    Intolerances    REVIEW OF SYSTEMS:  CONSTITUTIONAL: No weakness, fevers or chills  EYES/ENT: No visual changes;  No vertigo or throat pain   NECK: No pain or stiffness  RESPIRATORY: No cough, wheezing, hemoptysis; No shortness of breath  CARDIOVASCULAR: No chest pain or palpitations  GASTROINTESTINAL: No abdominal pain. No nausea, vomiting, or hematemesis; No diarrhea or constipation. No melena or hematochezia.  GENITOURINARY: No dysuria, frequency or hematuria  NEUROLOGICAL: No numbness or weakness  SKIN: No itching or rash  All other review of systems is negative unless indicated above  VITAL SIGNS:   Vital Signs Last 24 Hrs  T(C): 36.8 (04 Dec 2022 12:30), Max: 37.1 (04 Dec 2022 08:00)  T(F): 98.2 (04 Dec 2022 12:30), Max: 98.8 (04 Dec 2022 08:00)  HR: 72 (04 Dec 2022 12:30) (72 - 81)  BP: 150/60 (04 Dec 2022 12:30) (150/60 - 163/63)  BP(mean): --  RR: 18 (04 Dec 2022 12:30) (18 - 18)  SpO2: 97% (04 Dec 2022 12:30) (97% - 97%)    Parameters below as of 04 Dec 2022 12:30  Patient On (Oxygen Delivery Method): room air      I&O's Summary    On Exam:  Constitutional: NAD, alert and oriented x 3, obese  Lungs:  Non-labored, breath sounds are clear but diminished bilaterally, No wheezing, rales or rhonchi  Cardiovascular: RRR.  S1 and S2 positive.  +murmurs ll/Vl, norubs, gallops or clicks  Gastrointestinal: Bowel Sounds present, soft, +tenderness.  Healing incisions from milind  Lymph: 1-2+ BLE edema. No cervical lymphadenopathy.  Neurological: Alert, no focal deficits  Skin: No rashes or ulcers.  +erythema BLE  Psych:  Mood & affect appropriate.    LABS: All Labs Reviewed:                        12.2   10.08 )-----------( 292      ( 04 Dec 2022 08:30 )             37.3     04 Dec 2022 08:30    142    |  111    |  17     ----------------------------<  155    4.4     |  24     |  1.20     Ca    8.7        04 Dec 2022 08:30  Mg     2.2       04 Dec 2022 08:30    TPro  6.4    /  Alb  2.4    /  TBili  0.4    /  DBili  x      /  AST  17     /  ALT  47     /  AlkPhos  139    04 Dec 2022 08:30    PT/INR - ( 04 Dec 2022 08:30 )   PT: 11.6 sec;   INR: 0.99 ratio     PTT - ( 04 Dec 2022 08:30 )  PTT:27.7 sec  Blood Culture:   12-04 @ 08:30  Pro Bnp 2037    RADIOLOGY:    ACC: [id number] EXAM:  CT ABDOMEN AND PELVIS IC                        ACC: [id number] EXAM:  CT ANGIO CHEST PULM ART [facility]                          PROCEDURE DATE:  12/04/2022          INTERPRETATION:  CLINICAL INFORMATION: Recent cholecystectomy, abdominal   pain, fever, shortness of breath, leg swelling.    COMPARISON: None.    CONTRAST/COMPLICATIONS:  IV Contrast: Omnipaque 350 (accession [id number]), IV contrast documented   in associated exam (accession [id number])  90 cc administered (accession   [id number]), 0 cc administered (accession [id number])   10 cc discarded   (accession [id number]), 0 cc discarded (accession [id number])  Oral Contrast: NONE  Complications: None reported at time of study completion    PROCEDURE:  CT Angiography of the Chestwas performed followed by portal venous phase   imaging of the Abdomen and Pelvis.  Sagittal and coronal reformats were performed as well as 3D (MIP)   reconstructions.    FINDINGS:    CHEST:  LUNGS AND LARGE AIRWAYS: Central airways are patent. Leftlower lobe   partial atelectasis. Superimposed infection cannot be excluded. Minimal   right dependent lung scarring.  PLEURA: No pleural effusion or pneumothorax.  VESSELS: No pulmonary embolus. Main pulmonary artery is enlarged,   measuring 3.3 cm.This may reflect pulmonary arterial hypertension.  HEART: Heart size is qualitatively within normal limits. Aortic valve and   mitral annular calcification. Coronary artery calcification. Trace   pericardial fluid.  MEDIASTINUM AND AIMEE: Small volumenodes or pneumothorax. Esophagus is   nondistended.  CHEST WALL AND LOWER NECK: Elevated left hemidiaphragm. No chest wall   hematoma. Visualized thyroid is unremarkable.    ABDOMEN AND PELVIS:  LIVER: Liver size is borderline enlarged, 18 cm in craniocaudal dimension.  BILE DUCTS: Mild central biliary distention, measuring up to 1 cm, which   may be reflective of post cholecystectomy status. Correlate with LFTs and   MRCP as warranted.  GALLBLADDER: Cholecystectomy. Trace fluid at the gallbladder fossa. No   loculated fluid collection.  SPLEEN: Spleen size within normal limits  PANCREAS: No acute peripancreatic inflammation  ADRENALS: Slight thickening of the adrenals.  KIDNEYS/URETERS: No hydronephrosis.    BLADDER: Mildly distended.  REPRODUCTIVE ORGANS: Hysterectomy.    BOWEL: Limited evaluation due to lack of oral contrast. Small to moderate   hiatal hernia. Stomach is underdistended. No small bowel distention. No   secondary sign of acute appendicitis. Mild stool burn of the colon limits   evaluation of the likely cause.  PERITONEUM: Trace fluid  VESSELS: No abdominal aortic aneurysm. Atheromatous changes.  RETROPERITONEUM/LYMPH NODES: Small volume nodes.  ABDOMINAL WALL: Postsurgical changes.  BONES: Osseous demineralization and multilevel degenerative changes of   bones. Scoliotic curvature of the spine. Central canal and neural   foramina are not adequately assessed on this study.    IMPRESSION:    CHEST:  1. No pulmonary embolus. Main pulmonary artery enlargement may reflect   pulmonary arterial hypertension.  2. Elevated left hemidiaphragm and partial left lower lobe atelectasis.   Superimposed infection cannot be excluded. Follow to resolution with   repeat chest CT in one month, or sooner as clinically warranted.  3. Aortic valve and coronary artery calcifications.    ABDOMEN/PELVIS:  1. Cholecystectomy. No loculated fluid collection along the gallbladder   fossa.  2. Mild central biliary distention may be due to postcholecystectomy   status. Correlate with LFTs and clinical status of the patient.    --- End of Report ---    SUREKHA YIN M.D., ATTENDING RADIOLOGIST  This document has been electronically signed. Dec  4 2022 12:17PM    EKG:   [facility] - Ramón Rouse Patel, [place], [place]  Office Number: [phone]    Initial Consult Note: This is an obese 76 y/o F with no cardiac Hx, s/p cholecystectomy 1 week ago presented to the ED c/o fever and some abdominal tenderness, found to have main pulmonary enlargement and possible volume overload.  Also noted to have possible cellulitis with superimposed edema of BLE. Pt very poor hx.     CHIEF COMPLAINT: Patient is a 77y old  Female who presents with a chief complaint of Fever (04 Dec 2022 14:20)    HPI:     IN THE ED:  Temp  98.2 F , HR 72 ,  / 60 ,RR 18 , SpO2 97 on RA  S/P: Lasix 40 IVP, 1 x IV azithro, 1 x IV rocephin  EKG:  Labs significant for Glucose 155, albumin 2.4, Alk phos 139, procal .25, BNP 2037  Imaging:  CXR: The lungs are clear with parenchymal density at the left CP angle that may reflect focus of atelectasis  Duplex u/s b/l LE - No evidence of deep venous thrombosis in either lower extremity  CT PE Chest + Abd and pelvis: Main pulmonary artery enlargement may reflect pulmonary arterial hypertension. Elevated left hemidiaphragm and partial left lower lobe atelectasis. Superimposed infection cannot be excluded. Follow to resolution with repeat chest CT in one month, or sooner as clinically warranted. Cholecystectomy. No loculated fluid collection along the gallbladder   fossa. Mild central biliary distention may be due to postcholecystectomy status. (04 Dec 2022 14:20)    PAST MEDICAL & SURGICAL HISTORY:  S/P cholecystectomy  11/2022    SOCIAL HISTORY:  No tobacco, ethanol, or drug abuse.  FAMILY HISTORY: no known scd or early cad     No family history of acute MI or sudden cardiac death.       Allergies    penicillin (Hives)    Intolerances    REVIEW OF SYSTEMS:    All other review of systems is negative unless indicated above  VITAL SIGNS:   Vital Signs Last 24 Hrs  T(C): 36.8 (04 Dec 2022 12:30), Max: 37.1 (04 Dec 2022 08:00)  T(F): 98.2 (04 Dec 2022 12:30), Max: 98.8 (04 Dec 2022 08:00)  HR: 72 (04 Dec 2022 12:30) (72 - 81)  BP: 150/60 (04 Dec 2022 12:30) (150/60 - 163/63)  BP(mean): --  RR: 18 (04 Dec 2022 12:30) (18 - 18)  SpO2: 97% (04 Dec 2022 12:30) (97% - 97%)    Parameters below as of 04 Dec 2022 12:30  Patient On (Oxygen Delivery Method): room air      I&O's Summary    On Exam:  Constitutional: NAD, alert and oriented x 3, obese  Lungs:  Decreased breath sounds b/l. No rales, crackles or wheeze appreciated.   Cardiovascular: RRR.  S1 and S2 positive.  +murmurs ll/Vl, norubs, gallops or clicks  Gastrointestinal: Bowel Sounds present, soft, +tenderness.  Healing incisions from milind  Lymph:  2-3+ BLE edema. No cervical lymphadenopathy.  Neurological: Alert, no focal deficits  Skin: No rashes or ulcers.  +erythema BLE  Psych:  Mood & affect appropriate.    LABS: All Labs Reviewed:                        12.2   10.08 )-----------( 292      ( 04 Dec 2022 08:30 )             37.3     04 Dec 2022 08:30    142    |  111    |  17     ----------------------------<  155    4.4     |  24     |  1.20     Ca    8.7        04 Dec 2022 08:30  Mg     2.2       04 Dec 2022 08:30    TPro  6.4    /  Alb  2.4    /  TBili  0.4    /  DBili  x      /  AST  17     /  ALT  47     /  AlkPhos  139    04 Dec 2022 08:30    PT/INR - ( 04 Dec 2022 08:30 )   PT: 11.6 sec;   INR: 0.99 ratio     PTT - ( 04 Dec 2022 08:30 )  PTT:27.7 sec  Blood Culture:   12-04 @ 08:30  Pro Bnp 2037    RADIOLOGY:    ACC: [id number] EXAM:  CT ABDOMEN AND PELVIS IC                        ACC: [id number] EXAM:  CT ANGIO CHEST PULM ART [facility]                          PROCEDURE DATE:  12/04/2022          INTERPRETATION:  CLINICAL INFORMATION: Recent cholecystectomy, abdominal   pain, fever, shortness of breath, leg swelling.    COMPARISON: None.    CONTRAST/COMPLICATIONS:  IV Contrast: Omnipaque 350 (accession [id number]), IV contrast documented   in associated exam (accession [id number])  90 cc administered (accession   [id number]), 0 cc administered (accession [id number])   10 cc discarded   (accession [id number]), 0 cc discarded (accession [id number])  Oral Contrast: NONE  Complications: None reported at time of study completion    PROCEDURE:  CT Angiography of the Chestwas performed followed by portal venous phase   imaging of the Abdomen and Pelvis.  Sagittal and coronal reformats were performed as well as 3D (MIP)   reconstructions.    FINDINGS:    CHEST:  LUNGS AND LARGE AIRWAYS: Central airways are patent. Leftlower lobe   partial atelectasis. Superimposed infection cannot be excluded. Minimal   right dependent lung scarring.  PLEURA: No pleural effusion or pneumothorax.  VESSELS: No pulmonary embolus. Main pulmonary artery is enlarged,   measuring 3.3 cm.This may reflect pulmonary arterial hypertension.  HEART: Heart size is qualitatively within normal limits. Aortic valve and   mitral annular calcification. Coronary artery calcification. Trace   pericardial fluid.  MEDIASTINUM AND AIMEE: Small volumenodes or pneumothorax. Esophagus is   nondistended.  CHEST WALL AND LOWER NECK: Elevated left hemidiaphragm. No chest wall   hematoma. Visualized thyroid is unremarkable.    ABDOMEN AND PELVIS:  LIVER: Liver size is borderline enlarged, 18 cm in craniocaudal dimension.  BILE DUCTS: Mild central biliary distention, measuring up to 1 cm, which   may be reflective of post cholecystectomy status. Correlate with LFTs and   MRCP as warranted.  GALLBLADDER: Cholecystectomy. Trace fluid at the gallbladder fossa. No   loculated fluid collection.  SPLEEN: Spleen size within normal limits  PANCREAS: No acute peripancreatic inflammation  ADRENALS: Slight thickening of the adrenals.  KIDNEYS/URETERS: No hydronephrosis.    BLADDER: Mildly distended.  REPRODUCTIVE ORGANS: Hysterectomy.    BOWEL: Limited evaluation due to lack of oral contrast. Small to moderate   hiatal hernia. Stomach is underdistended. No small bowel distention. No   secondary sign of acute appendicitis. Mild stool burn of the colon limits   evaluation of the likely cause.  PERITONEUM: Trace fluid  VESSELS: No abdominal aortic aneurysm. Atheromatous changes.  RETROPERITONEUM/LYMPH NODES: Small volume nodes.  ABDOMINAL WALL: Postsurgical changes.  BONES: Osseous demineralization and multilevel degenerative changes of   bones. Scoliotic curvature of the spine. Central canal and neural   foramina are not adequately assessed on this study.    IMPRESSION:    CHEST:  1. No pulmonary embolus. Main pulmonary artery enlargement may reflect   pulmonary arterial hypertension.  2. Elevated left hemidiaphragm and partial left lower lobe atelectasis.   Superimposed infection cannot be excluded. Follow to resolution with   repeat chest CT in one month, or sooner as clinically warranted.  3. Aortic valve and coronary artery calcifications.    ABDOMEN/PELVIS:  1. Cholecystectomy. No loculated fluid collection along the gallbladder   fossa.  2. Mild central biliary distention may be due to postcholecystectomy   status. Correlate with LFTs and clinical status of the patient.    --- End of Report ---    SUREKHA YIN M.D., ATTENDING RADIOLOGIST  This document has been electronically signed. Dec  4 2022 12:17PM    EKG:   [facility] - Ramón Rouse Patel, [place]ger, [place]  Office Number: [phone]    Initial Consult Note: This is an obese 76 y/o F with no cardiac Hx, s/p cholecystectomy 1 week ago presented to the ED c/o fever and some abdominal tenderness, found to have main pulmonary enlargement and possible volume overload.  Also noted to have possible cellulitis with superimposed edema of BLE. Pt very poor hx.     CHIEF COMPLAINT: Patient is a 77y old  Female who presents with a chief complaint of Fever (04 Dec 2022 14:20)    HPI:     IN THE ED:  Temp  98.2 F , HR 72 ,  / 60 ,RR 18 , SpO2 97 on RA  S/P: Lasix 40 IVP, 1 x IV azithro, 1 x IV rocephin  EKG:  Labs significant for Glucose 155, albumin 2.4, Alk phos 139, procal .25, BNP 2037  Imaging:  CXR: The lungs are clear with parenchymal density at the left CP angle that may reflect focus of atelectasis  Duplex u/s b/l LE - No evidence of deep venous thrombosis in either lower extremity  CT PE Chest + Abd and pelvis: Main pulmonary artery enlargement may reflect pulmonary arterial hypertension. Elevated left hemidiaphragm and partial left lower lobe atelectasis. Superimposed infection cannot be excluded. Follow to resolution with repeat chest CT in one month, or sooner as clinically warranted. Cholecystectomy. No loculated fluid collection along the gallbladder   fossa. Mild central biliary distention may be due to postcholecystectomy status. (04 Dec 2022 14:20)    PAST MEDICAL & SURGICAL HISTORY:  S/P cholecystectomy  11/2022    SOCIAL HISTORY:  No tobacco, ethanol, or drug abuse.  FAMILY HISTORY: no known scd or early cad     No family history of acute MI or sudden cardiac death.       Allergies    penicillin (Hives)    Intolerances    REVIEW OF SYSTEMS:    All other review of systems is negative unless indicated above  VITAL SIGNS:   Vital Signs Last 24 Hrs  T(C): 36.8 (04 Dec 2022 12:30), Max: 37.1 (04 Dec 2022 08:00)  T(F): 98.2 (04 Dec 2022 12:30), Max: 98.8 (04 Dec 2022 08:00)  HR: 72 (04 Dec 2022 12:30) (72 - 81)  BP: 150/60 (04 Dec 2022 12:30) (150/60 - 163/63)  BP(mean): --  RR: 18 (04 Dec 2022 12:30) (18 - 18)  SpO2: 97% (04 Dec 2022 12:30) (97% - 97%)    Parameters below as of 04 Dec 2022 12:30  Patient On (Oxygen Delivery Method): room air      I&O's Summary    On Exam:  Constitutional: NAD, alert and oriented x 3, obese  Lungs:  Decreased breath sounds b/l. No rales, crackles or wheeze appreciated.   Cardiovascular: RRR.  S1 and S2 positive.  +murmurs ll/Vl, norubs, gallops or clicks  Gastrointestinal: Bowel Sounds present, soft, +tenderness.  Healing incisions from milind  Lymph:  2-3+ BLE edema. No cervical lymphadenopathy.  Neurological: Alert, no focal deficits  Skin: No rashes or ulcers.  +erythema BLE  Psych:  Mood & affect appropriate.    LABS: All Labs Reviewed:                        12.2   10.08 )-----------( 292      ( 04 Dec 2022 08:30 )             37.3     04 Dec 2022 08:30    142    |  111    |  17     ----------------------------<  155    4.4     |  24     |  1.20     Ca    8.7        04 Dec 2022 08:30  Mg     2.2       04 Dec 2022 08:30    TPro  6.4    /  Alb  2.4    /  TBili  0.4    /  DBili  x      /  AST  17     /  ALT  47     /  AlkPhos  139    04 Dec 2022 08:30    PT/INR - ( 04 Dec 2022 08:30 )   PT: 11.6 sec;   INR: 0.99 ratio     PTT - ( 04 Dec 2022 08:30 )  PTT:27.7 sec  Blood Culture:   12-04 @ 08:30  Pro Bnp 2037    RADIOLOGY:    ACC: [id number] EXAM:  CT ABDOMEN AND PELVIS IC                        ACC: [id number] EXAM:  CT ANGIO CHEST PULM ART [facility]                          PROCEDURE DATE:  12/04/2022          INTERPRETATION:  CLINICAL INFORMATION: Recent cholecystectomy, abdominal   pain, fever, shortness of breath, leg swelling.    COMPARISON: None.    CONTRAST/COMPLICATIONS:  IV Contrast: Omnipaque 350 (accession [id number]), IV contrast documented   in associated exam (accession [id number])  90 cc administered (accession   [id number]), 0 cc administered (accession [id number])   10 cc discarded   (accession [id number]), 0 cc discarded (accession [id number])  Oral Contrast: NONE  Complications: None reported at time of study completion    PROCEDURE:  CT Angiography of the Chestwas performed followed by portal venous phase   imaging of the Abdomen and Pelvis.  Sagittal and coronal reformats were performed as well as 3D (MIP)   reconstructions.    FINDINGS:    CHEST:  LUNGS AND LARGE AIRWAYS: Central airways are patent. Leftlower lobe   partial atelectasis. Superimposed infection cannot be excluded. Minimal   right dependent lung scarring.  PLEURA: No pleural effusion or pneumothorax.  VESSELS: No pulmonary embolus. Main pulmonary artery is enlarged,   measuring 3.3 cm.This may reflect pulmonary arterial hypertension.  HEART: Heart size is qualitatively within normal limits. Aortic valve and   mitral annular calcification. Coronary artery calcification. Trace   pericardial fluid.  MEDIASTINUM AND AIMEE: Small volumenodes or pneumothorax. Esophagus is   nondistended.  CHEST WALL AND LOWER NECK: Elevated left hemidiaphragm. No chest wall   hematoma. Visualized thyroid is unremarkable.    ABDOMEN AND PELVIS:  LIVER: Liver size is borderline enlarged, 18 cm in craniocaudal dimension.  BILE DUCTS: Mild central biliary distention, measuring up to 1 cm, which   may be reflective of post cholecystectomy status. Correlate with LFTs and   MRCP as warranted.  GALLBLADDER: Cholecystectomy. Trace fluid at the gallbladder fossa. No   loculated fluid collection.  SPLEEN: Spleen size within normal limits  PANCREAS: No acute peripancreatic inflammation  ADRENALS: Slight thickening of the adrenals.  KIDNEYS/URETERS: No hydronephrosis.    BLADDER: Mildly distended.  REPRODUCTIVE ORGANS: Hysterectomy.    BOWEL: Limited evaluation due to lack of oral contrast. Small to moderate   hiatal hernia. Stomach is underdistended. No small bowel distention. No   secondary sign of acute appendicitis. Mild stool burn of the colon limits   evaluation of the likely cause.  PERITONEUM: Trace fluid  VESSELS: No abdominal aortic aneurysm. Atheromatous changes.  RETROPERITONEUM/LYMPH NODES: Small volume nodes.  ABDOMINAL WALL: Postsurgical changes.  BONES: Osseous demineralization and multilevel degenerative changes of   bones. Scoliotic curvature of the spine. Central canal and neural   foramina are not adequately assessed on this study.    IMPRESSION:    CHEST:  1. No pulmonary embolus. Main pulmonary artery enlargement may reflect   pulmonary arterial hypertension.  2. Elevated left hemidiaphragm and partial left lower lobe atelectasis.   Superimposed infection cannot be excluded. Follow to resolution with   repeat chest CT in one month, or sooner as clinically warranted.  3. Aortic valve and coronary artery calcifications.    ABDOMEN/PELVIS:  1. Cholecystectomy. No loculated fluid collection along the gallbladder   fossa.  2. Mild central biliary distention may be due to postcholecystectomy   status. Correlate with LFTs and clinical status of the patient.    --- End of Report ---    SUREKHA YIN M.D., ATTENDING RADIOLOGIST  This document has been electronically signed. Dec  4 2022 12:17PM    EKG:   [facility] - Ramón Rouse Patel, [place], [place]  Office Number: [phone]    Initial Consult Note: This is an obese 78 y/o F with no cardiac Hx, s/p cholecystectomy 1 week ago presented to the ED c/o fever and some abdominal tenderness, found to have main pulmonary enlargement and possible volume overload.  Also noted to have possible cellulitis with superimposed edema of BLE. Pt very poor hx.     CHIEF COMPLAINT: Patient is a 77y old  Female who presents with a chief complaint of Fever (04 Dec 2022 14:20)    HPI:     IN THE ED:  Temp  98.2 F , HR 72 ,  / 60 ,RR 18 , SpO2 97 on RA  S/P: Lasix 40 IVP, 1 x IV azithro, 1 x IV rocephin  EKG:  Labs significant for Glucose 155, albumin 2.4, Alk phos 139, procal .25, BNP 2037  Imaging:  CXR: The lungs are clear with parenchymal density at the left CP angle that may reflect focus of atelectasis  Duplex u/s b/l LE - No evidence of deep venous thrombosis in either lower extremity  CT PE Chest + Abd and pelvis: Main pulmonary artery enlargement may reflect pulmonary arterial hypertension. Elevated left hemidiaphragm and partial left lower lobe atelectasis. Superimposed infection cannot be excluded. Follow to resolution with repeat chest CT in one month, or sooner as clinically warranted. Cholecystectomy. No loculated fluid collection along the gallbladder   fossa. Mild central biliary distention may be due to postcholecystectomy status. (04 Dec 2022 14:20)    PAST MEDICAL & SURGICAL HISTORY:  S/P cholecystectomy  11/2022    SOCIAL HISTORY:  No tobacco, ethanol, or drug abuse.  FAMILY HISTORY: no known scd or early cad     No family history of acute MI or sudden cardiac death.       Allergies    penicillin (Hives)    Intolerances    REVIEW OF SYSTEMS:    All other review of systems is negative unless indicated above  VITAL SIGNS:   Vital Signs Last 24 Hrs  T(C): 36.8 (04 Dec 2022 12:30), Max: 37.1 (04 Dec 2022 08:00)  T(F): 98.2 (04 Dec 2022 12:30), Max: 98.8 (04 Dec 2022 08:00)  HR: 72 (04 Dec 2022 12:30) (72 - 81)  BP: 150/60 (04 Dec 2022 12:30) (150/60 - 163/63)  BP(mean): --  RR: 18 (04 Dec 2022 12:30) (18 - 18)  SpO2: 97% (04 Dec 2022 12:30) (97% - 97%)    Parameters below as of 04 Dec 2022 12:30  Patient On (Oxygen Delivery Method): room air      I&O's Summary    On Exam:  Constitutional: NAD, alert and oriented x 3, obese  Lungs:  Decreased breath sounds b/l. No rales, crackles or wheeze appreciated.   Cardiovascular: RRR.  S1 and S2 positive.  +murmurs ll/Vl, norubs, gallops or clicks  Gastrointestinal: Bowel Sounds present, soft, +tenderness.  Healing incisions from milind  Lymph:  2-3+ BLE edema. No cervical lymphadenopathy.  Neurological: Alert, no focal deficits  Skin: No rashes or ulcers.  +erythema BLE  Psych:  Mood & affect appropriate.    LABS: All Labs Reviewed:                        12.2   10.08 )-----------( 292      ( 04 Dec 2022 08:30 )             37.3     04 Dec 2022 08:30    142    |  111    |  17     ----------------------------<  155    4.4     |  24     |  1.20     Ca    8.7        04 Dec 2022 08:30  Mg     2.2       04 Dec 2022 08:30    TPro  6.4    /  Alb  2.4    /  TBili  0.4    /  DBili  x      /  AST  17     /  ALT  47     /  AlkPhos  139    04 Dec 2022 08:30    PT/INR - ( 04 Dec 2022 08:30 )   PT: 11.6 sec;   INR: 0.99 ratio     PTT - ( 04 Dec 2022 08:30 )  PTT:27.7 sec  Blood Culture:   12-04 @ 08:30  Pro Bnp 2037    RADIOLOGY:    ACC: [id number] EXAM:  CT ABDOMEN AND PELVIS IC                        ACC: [id number] EXAM:  CT ANGIO CHEST PULM ART [facility]                          PROCEDURE DATE:  12/04/2022          INTERPRETATION:  CLINICAL INFORMATION: Recent cholecystectomy, abdominal   pain, fever, shortness of breath, leg swelling.    COMPARISON: None.    CONTRAST/COMPLICATIONS:  IV Contrast: Omnipaque 350 (accession [id number]), IV contrast documented   in associated exam (accession [id number])  90 cc administered (accession   [id number]), 0 cc administered (accession [id number])   10 cc discarded   (accession [id number]), 0 cc discarded (accession [id number])  Oral Contrast: NONE  Complications: None reported at time of study completion    PROCEDURE:  CT Angiography of the Chestwas performed followed by portal venous phase   imaging of the Abdomen and Pelvis.  Sagittal and coronal reformats were performed as well as 3D (MIP)   reconstructions.    FINDINGS:    CHEST:  LUNGS AND LARGE AIRWAYS: Central airways are patent. Leftlower lobe   partial atelectasis. Superimposed infection cannot be excluded. Minimal   right dependent lung scarring.  PLEURA: No pleural effusion or pneumothorax.  VESSELS: No pulmonary embolus. Main pulmonary artery is enlarged,   measuring 3.3 cm.This may reflect pulmonary arterial hypertension.  HEART: Heart size is qualitatively within normal limits. Aortic valve and   mitral annular calcification. Coronary artery calcification. Trace   pericardial fluid.  MEDIASTINUM AND AIMEE: Small volumenodes or pneumothorax. Esophagus is   nondistended.  CHEST WALL AND LOWER NECK: Elevated left hemidiaphragm. No chest wall   hematoma. Visualized thyroid is unremarkable.    ABDOMEN AND PELVIS:  LIVER: Liver size is borderline enlarged, 18 cm in craniocaudal dimension.  BILE DUCTS: Mild central biliary distention, measuring up to 1 cm, which   may be reflective of post cholecystectomy status. Correlate with LFTs and   MRCP as warranted.  GALLBLADDER: Cholecystectomy. Trace fluid at the gallbladder fossa. No   loculated fluid collection.  SPLEEN: Spleen size within normal limits  PANCREAS: No acute peripancreatic inflammation  ADRENALS: Slight thickening of the adrenals.  KIDNEYS/URETERS: No hydronephrosis.    BLADDER: Mildly distended.  REPRODUCTIVE ORGANS: Hysterectomy.    BOWEL: Limited evaluation due to lack of oral contrast. Small to moderate   hiatal hernia. Stomach is underdistended. No small bowel distention. No   secondary sign of acute appendicitis. Mild stool burn of the colon limits   evaluation of the likely cause.  PERITONEUM: Trace fluid  VESSELS: No abdominal aortic aneurysm. Atheromatous changes.  RETROPERITONEUM/LYMPH NODES: Small volume nodes.  ABDOMINAL WALL: Postsurgical changes.  BONES: Osseous demineralization and multilevel degenerative changes of   bones. Scoliotic curvature of the spine. Central canal and neural   foramina are not adequately assessed on this study.    IMPRESSION:    CHEST:  1. No pulmonary embolus. Main pulmonary artery enlargement may reflect   pulmonary arterial hypertension.  2. Elevated left hemidiaphragm and partial left lower lobe atelectasis.   Superimposed infection cannot be excluded. Follow to resolution with   repeat chest CT in one month, or sooner as clinically warranted.  3. Aortic valve and coronary artery calcifications.    ABDOMEN/PELVIS:  1. Cholecystectomy. No loculated fluid collection along the gallbladder   fossa.  2. Mild central biliary distention may be due to postcholecystectomy   status. Correlate with LFTs and clinical status of the patient.    --- End of Report ---    SUREKHA YIN M.D., ATTENDING RADIOLOGIST  This document has been electronically signed. Dec  4 2022 12:17PM    EKG:   [facility] - Ramón Rouse Patel, Goodger, Savella  Office Number: [phone]    Initial Consult Note: This is an obese 78 y/o F with no cardiac Hx, s/p cholecystectomy 1 week ago presented to the ED c/o fever and some abdominal tenderness, found to have main pulmonary enlargement and possible volume overload.  Also noted to have possible cellulitis with superimposed edema of BLE. Pt very poor hx.     CHIEF COMPLAINT: Patient is a 77y old  Female who presents with a chief complaint of Fever (04 Dec 2022 14:20)    HPI:     IN THE ED:  Temp  98.2 F , HR 72 ,  / 60 ,RR 18 , SpO2 97 on RA  S/P: Lasix 40 IVP, 1 x IV azithro, 1 x IV rocephin  EKG:  Labs significant for Glucose 155, albumin 2.4, Alk phos 139, procal .25, BNP 2037  Imaging:  CXR: The lungs are clear with parenchymal density at the left CP angle that may reflect focus of atelectasis  Duplex u/s b/l LE - No evidence of deep venous thrombosis in either lower extremity  CT PE Chest + Abd and pelvis: Main pulmonary artery enlargement may reflect pulmonary arterial hypertension. Elevated left hemidiaphragm and partial left lower lobe atelectasis. Superimposed infection cannot be excluded. Follow to resolution with repeat chest CT in one month, or sooner as clinically warranted. Cholecystectomy. No loculated fluid collection along the gallbladder   fossa. Mild central biliary distention may be due to postcholecystectomy status. (04 Dec 2022 14:20)    PAST MEDICAL & SURGICAL HISTORY:  S/P cholecystectomy  11/2022    SOCIAL HISTORY:  No tobacco, ethanol, or drug abuse.  FAMILY HISTORY: no known scd or early cad     No family history of acute MI or sudden cardiac death.       Allergies    penicillin (Hives)    Intolerances    REVIEW OF SYSTEMS:    All other review of systems is negative unless indicated above  VITAL SIGNS:   Vital Signs Last 24 Hrs  T(C): 36.8 (04 Dec 2022 12:30), Max: 37.1 (04 Dec 2022 08:00)  T(F): 98.2 (04 Dec 2022 12:30), Max: 98.8 (04 Dec 2022 08:00)  HR: 72 (04 Dec 2022 12:30) (72 - 81)  BP: 150/60 (04 Dec 2022 12:30) (150/60 - 163/63)  BP(mean): --  RR: 18 (04 Dec 2022 12:30) (18 - 18)  SpO2: 97% (04 Dec 2022 12:30) (97% - 97%)    Parameters below as of 04 Dec 2022 12:30  Patient On (Oxygen Delivery Method): room air      I&O's Summary    On Exam:  Constitutional: NAD, alert and oriented x 3, obese  Lungs:  Decreased breath sounds b/l. No rales, crackles or wheeze appreciated.   Cardiovascular: RRR.  S1 and S2 positive.  +murmurs ll/Vl, norubs, gallops or clicks  Gastrointestinal: Bowel Sounds present, soft, +tenderness.  Healing incisions from milind  Lymph:  2-3+ BLE edema. No cervical lymphadenopathy.  Neurological: Alert, no focal deficits  Skin: No rashes or ulcers.  +erythema BLE  Psych:  Mood & affect appropriate.    LABS: All Labs Reviewed:                        12.2   10.08 )-----------( 292      ( 04 Dec 2022 08:30 )             37.3     04 Dec 2022 08:30    142    |  111    |  17     ----------------------------<  155    4.4     |  24     |  1.20     Ca    8.7        04 Dec 2022 08:30  Mg     2.2       04 Dec 2022 08:30    TPro  6.4    /  Alb  2.4    /  TBili  0.4    /  DBili  x      /  AST  17     /  ALT  47     /  AlkPhos  139    04 Dec 2022 08:30    PT/INR - ( 04 Dec 2022 08:30 )   PT: 11.6 sec;   INR: 0.99 ratio     PTT - ( 04 Dec 2022 08:30 )  PTT:27.7 sec  Blood Culture:   12-04 @ 08:30  Pro Bnp 2037    RADIOLOGY:    ACC: [id number] EXAM:  CT ABDOMEN AND PELVIS IC                        ACC: [id number] EXAM:  CT ANGIO CHEST PULM ART [facility]                          PROCEDURE DATE:  12/04/2022          INTERPRETATION:  CLINICAL INFORMATION: Recent cholecystectomy, abdominal   pain, fever, shortness of breath, leg swelling.    COMPARISON: None.    CONTRAST/COMPLICATIONS:  IV Contrast: Omnipaque 350 (accession [id number]), IV contrast documented   in associated exam (accession [id number])  90 cc administered (accession   [id number]), 0 cc administered (accession [id number])   10 cc discarded   (accession [id number]), 0 cc discarded (accession [id number])  Oral Contrast: NONE  Complications: None reported at time of study completion    PROCEDURE:  CT Angiography of the Chestwas performed followed by portal venous phase   imaging of the Abdomen and Pelvis.  Sagittal and coronal reformats were performed as well as 3D (MIP)   reconstructions.    FINDINGS:    CHEST:  LUNGS AND LARGE AIRWAYS: Central airways are patent. Leftlower lobe   partial atelectasis. Superimposed infection cannot be excluded. Minimal   right dependent lung scarring.  PLEURA: No pleural effusion or pneumothorax.  VESSELS: No pulmonary embolus. Main pulmonary artery is enlarged,   measuring 3.3 cm.This may reflect pulmonary arterial hypertension.  HEART: Heart size is qualitatively within normal limits. Aortic valve and   mitral annular calcification. Coronary artery calcification. Trace   pericardial fluid.  MEDIASTINUM AND AIMEE: Small volumenodes or pneumothorax. Esophagus is   nondistended.  CHEST WALL AND LOWER NECK: Elevated left hemidiaphragm. No chest wall   hematoma. Visualized thyroid is unremarkable.    ABDOMEN AND PELVIS:  LIVER: Liver size is borderline enlarged, 18 cm in craniocaudal dimension.  BILE DUCTS: Mild central biliary distention, measuring up to 1 cm, which   may be reflective of post cholecystectomy status. Correlate with LFTs and   MRCP as warranted.  GALLBLADDER: Cholecystectomy. Trace fluid at the gallbladder fossa. No   loculated fluid collection.  SPLEEN: Spleen size within normal limits  PANCREAS: No acute peripancreatic inflammation  ADRENALS: Slight thickening of the adrenals.  KIDNEYS/URETERS: No hydronephrosis.    BLADDER: Mildly distended.  REPRODUCTIVE ORGANS: Hysterectomy.    BOWEL: Limited evaluation due to lack of oral contrast. Small to moderate   hiatal hernia. Stomach is underdistended. No small bowel distention. No   secondary sign of acute appendicitis. Mild stool burn of the colon limits   evaluation of the likely cause.  PERITONEUM: Trace fluid  VESSELS: No abdominal aortic aneurysm. Atheromatous changes.  RETROPERITONEUM/LYMPH NODES: Small volume nodes.  ABDOMINAL WALL: Postsurgical changes.  BONES: Osseous demineralization and multilevel degenerative changes of   bones. Scoliotic curvature of the spine. Central canal and neural   foramina are not adequately assessed on this study.    IMPRESSION:    CHEST:  1. No pulmonary embolus. Main pulmonary artery enlargement may reflect   pulmonary arterial hypertension.  2. Elevated left hemidiaphragm and partial left lower lobe atelectasis.   Superimposed infection cannot be excluded. Follow to resolution with   repeat chest CT in one month, or sooner as clinically warranted.  3. Aortic valve and coronary artery calcifications.    ABDOMEN/PELVIS:  1. Cholecystectomy. No loculated fluid collection along the gallbladder   fossa.  2. Mild central biliary distention may be due to postcholecystectomy   status. Correlate with LFTs and clinical status of the patient.    --- End of Report ---    SUREKHA YIN M.D., ATTENDING RADIOLOGIST  This document has been electronically signed. Dec  4 2022 12:17PM    EKG: NSR with poor baseline, no ischemic changes   [facility] - Ramón Rouse Patel, Goodger, Savella  Office Number: [phone]    Initial Consult Note: This is an obese 76 y/o F with no cardiac Hx, s/p cholecystectomy 1 week ago presented to the ED c/o fever and some abdominal tenderness, found to have main pulmonary enlargement and possible volume overload.  Also noted to have possible cellulitis with superimposed edema of BLE. Pt very poor hx.     CHIEF COMPLAINT: Patient is a 77y old  Female who presents with a chief complaint of Fever (04 Dec 2022 14:20)    HPI:     IN THE ED:  Temp  98.2 F , HR 72 ,  / 60 ,RR 18 , SpO2 97 on RA  S/P: Lasix 40 IVP, 1 x IV azithro, 1 x IV rocephin  EKG:  Labs significant for Glucose 155, albumin 2.4, Alk phos 139, procal .25, BNP 2037  Imaging:  CXR: The lungs are clear with parenchymal density at the left CP angle that may reflect focus of atelectasis  Duplex u/s b/l LE - No evidence of deep venous thrombosis in either lower extremity  CT PE Chest + Abd and pelvis: Main pulmonary artery enlargement may reflect pulmonary arterial hypertension. Elevated left hemidiaphragm and partial left lower lobe atelectasis. Superimposed infection cannot be excluded. Follow to resolution with repeat chest CT in one month, or sooner as clinically warranted. Cholecystectomy. No loculated fluid collection along the gallbladder   fossa. Mild central biliary distention may be due to postcholecystectomy status. (04 Dec 2022 14:20)    PAST MEDICAL & SURGICAL HISTORY:  S/P cholecystectomy  11/2022    SOCIAL HISTORY:  No tobacco, ethanol, or drug abuse.  FAMILY HISTORY: no known scd or early cad     No family history of acute MI or sudden cardiac death.       Allergies    penicillin (Hives)    Intolerances    REVIEW OF SYSTEMS:    All other review of systems is negative unless indicated above  VITAL SIGNS:   Vital Signs Last 24 Hrs  T(C): 36.8 (04 Dec 2022 12:30), Max: 37.1 (04 Dec 2022 08:00)  T(F): 98.2 (04 Dec 2022 12:30), Max: 98.8 (04 Dec 2022 08:00)  HR: 72 (04 Dec 2022 12:30) (72 - 81)  BP: 150/60 (04 Dec 2022 12:30) (150/60 - 163/63)  BP(mean): --  RR: 18 (04 Dec 2022 12:30) (18 - 18)  SpO2: 97% (04 Dec 2022 12:30) (97% - 97%)    Parameters below as of 04 Dec 2022 12:30  Patient On (Oxygen Delivery Method): room air      I&O's Summary    On Exam:  Constitutional: NAD, alert and oriented x 3, obese  Lungs:  Decreased breath sounds b/l. No rales, crackles or wheeze appreciated.   Cardiovascular: RRR.  S1 and S2 positive.  +murmurs ll/Vl, norubs, gallops or clicks  Gastrointestinal: Bowel Sounds present, soft, +tenderness.  Healing incisions from milind  Lymph:  2-3+ BLE edema. No cervical lymphadenopathy.  Neurological: Alert, no focal deficits  Skin: No rashes or ulcers.  +erythema BLE  Psych:  Mood & affect appropriate.    LABS: All Labs Reviewed:                        12.2   10.08 )-----------( 292      ( 04 Dec 2022 08:30 )             37.3     04 Dec 2022 08:30    142    |  111    |  17     ----------------------------<  155    4.4     |  24     |  1.20     Ca    8.7        04 Dec 2022 08:30  Mg     2.2       04 Dec 2022 08:30    TPro  6.4    /  Alb  2.4    /  TBili  0.4    /  DBili  x      /  AST  17     /  ALT  47     /  AlkPhos  139    04 Dec 2022 08:30    PT/INR - ( 04 Dec 2022 08:30 )   PT: 11.6 sec;   INR: 0.99 ratio     PTT - ( 04 Dec 2022 08:30 )  PTT:27.7 sec  Blood Culture:   12-04 @ 08:30  Pro Bnp 2037    RADIOLOGY:    ACC: [id number] EXAM:  CT ABDOMEN AND PELVIS IC                        ACC: [id number] EXAM:  CT ANGIO CHEST PULM ART [facility]                          PROCEDURE DATE:  12/04/2022          INTERPRETATION:  CLINICAL INFORMATION: Recent cholecystectomy, abdominal   pain, fever, shortness of breath, leg swelling.    COMPARISON: None.    CONTRAST/COMPLICATIONS:  IV Contrast: Omnipaque 350 (accession [id number]), IV contrast documented   in associated exam (accession [id number])  90 cc administered (accession   [id number]), 0 cc administered (accession [id number])   10 cc discarded   (accession [id number]), 0 cc discarded (accession [id number])  Oral Contrast: NONE  Complications: None reported at time of study completion    PROCEDURE:  CT Angiography of the Chestwas performed followed by portal venous phase   imaging of the Abdomen and Pelvis.  Sagittal and coronal reformats were performed as well as 3D (MIP)   reconstructions.    FINDINGS:    CHEST:  LUNGS AND LARGE AIRWAYS: Central airways are patent. Leftlower lobe   partial atelectasis. Superimposed infection cannot be excluded. Minimal   right dependent lung scarring.  PLEURA: No pleural effusion or pneumothorax.  VESSELS: No pulmonary embolus. Main pulmonary artery is enlarged,   measuring 3.3 cm.This may reflect pulmonary arterial hypertension.  HEART: Heart size is qualitatively within normal limits. Aortic valve and   mitral annular calcification. Coronary artery calcification. Trace   pericardial fluid.  MEDIASTINUM AND AIMEE: Small volumenodes or pneumothorax. Esophagus is   nondistended.  CHEST WALL AND LOWER NECK: Elevated left hemidiaphragm. No chest wall   hematoma. Visualized thyroid is unremarkable.    ABDOMEN AND PELVIS:  LIVER: Liver size is borderline enlarged, 18 cm in craniocaudal dimension.  BILE DUCTS: Mild central biliary distention, measuring up to 1 cm, which   may be reflective of post cholecystectomy status. Correlate with LFTs and   MRCP as warranted.  GALLBLADDER: Cholecystectomy. Trace fluid at the gallbladder fossa. No   loculated fluid collection.  SPLEEN: Spleen size within normal limits  PANCREAS: No acute peripancreatic inflammation  ADRENALS: Slight thickening of the adrenals.  KIDNEYS/URETERS: No hydronephrosis.    BLADDER: Mildly distended.  REPRODUCTIVE ORGANS: Hysterectomy.    BOWEL: Limited evaluation due to lack of oral contrast. Small to moderate   hiatal hernia. Stomach is underdistended. No small bowel distention. No   secondary sign of acute appendicitis. Mild stool burn of the colon limits   evaluation of the likely cause.  PERITONEUM: Trace fluid  VESSELS: No abdominal aortic aneurysm. Atheromatous changes.  RETROPERITONEUM/LYMPH NODES: Small volume nodes.  ABDOMINAL WALL: Postsurgical changes.  BONES: Osseous demineralization and multilevel degenerative changes of   bones. Scoliotic curvature of the spine. Central canal and neural   foramina are not adequately assessed on this study.    IMPRESSION:    CHEST:  1. No pulmonary embolus. Main pulmonary artery enlargement may reflect   pulmonary arterial hypertension.  2. Elevated left hemidiaphragm and partial left lower lobe atelectasis.   Superimposed infection cannot be excluded. Follow to resolution with   repeat chest CT in one month, or sooner as clinically warranted.  3. Aortic valve and coronary artery calcifications.    ABDOMEN/PELVIS:  1. Cholecystectomy. No loculated fluid collection along the gallbladder   fossa.  2. Mild central biliary distention may be due to postcholecystectomy   status. Correlate with LFTs and clinical status of the patient.    --- End of Report ---    SUREKHA YIN M.D., ATTENDING RADIOLOGIST  This document has been electronically signed. Dec  4 2022 12:17PM    EKG: NSR with poor baseline, no ischemic changes   [facility] - Ramón Rouse Patel, Goodger, Savella  Office Number: [phone]    Initial Consult Note: This is an obese 78 y/o F with no cardiac Hx, s/p cholecystectomy 1 week ago presented to the ED c/o fever and some abdominal tenderness, found to have main pulmonary enlargement and possible volume overload.  Also noted to have possible cellulitis with superimposed edema of BLE. Pt very poor hx.     CHIEF COMPLAINT: Patient is a 77y old  Female who presents with a chief complaint of Fever (04 Dec 2022 14:20)    HPI:     IN THE ED:  Temp  98.2 F , HR 72 ,  / 60 ,RR 18 , SpO2 97 on RA  S/P: Lasix 40 IVP, 1 x IV azithro, 1 x IV rocephin  EKG:  Labs significant for Glucose 155, albumin 2.4, Alk phos 139, procal .25, BNP 2037  Imaging:  CXR: The lungs are clear with parenchymal density at the left CP angle that may reflect focus of atelectasis  Duplex u/s b/l LE - No evidence of deep venous thrombosis in either lower extremity  CT PE Chest + Abd and pelvis: Main pulmonary artery enlargement may reflect pulmonary arterial hypertension. Elevated left hemidiaphragm and partial left lower lobe atelectasis. Superimposed infection cannot be excluded. Follow to resolution with repeat chest CT in one month, or sooner as clinically warranted. Cholecystectomy. No loculated fluid collection along the gallbladder   fossa. Mild central biliary distention may be due to postcholecystectomy status. (04 Dec 2022 14:20)    PAST MEDICAL & SURGICAL HISTORY:  S/P cholecystectomy  11/2022    SOCIAL HISTORY:  No tobacco, ethanol, or drug abuse.  FAMILY HISTORY: no known scd or early cad     No family history of acute MI or sudden cardiac death.       Allergies    penicillin (Hives)    Intolerances    REVIEW OF SYSTEMS:    All other review of systems is negative unless indicated above  VITAL SIGNS:   Vital Signs Last 24 Hrs  T(C): 36.8 (04 Dec 2022 12:30), Max: 37.1 (04 Dec 2022 08:00)  T(F): 98.2 (04 Dec 2022 12:30), Max: 98.8 (04 Dec 2022 08:00)  HR: 72 (04 Dec 2022 12:30) (72 - 81)  BP: 150/60 (04 Dec 2022 12:30) (150/60 - 163/63)  BP(mean): --  RR: 18 (04 Dec 2022 12:30) (18 - 18)  SpO2: 97% (04 Dec 2022 12:30) (97% - 97%)    Parameters below as of 04 Dec 2022 12:30  Patient On (Oxygen Delivery Method): room air      I&O's Summary    On Exam:  Constitutional: NAD, alert and oriented x 3, obese  Lungs:  Decreased breath sounds b/l. No rales, crackles or wheeze appreciated.   Cardiovascular: RRR.  S1 and S2 positive.  +murmurs ll/Vl, norubs, gallops or clicks  Gastrointestinal: Bowel Sounds present, soft, +tenderness.  Healing incisions from milind  Lymph:  2-3+ BLE edema. No cervical lymphadenopathy.  Neurological: Alert, no focal deficits  Skin: No rashes or ulcers.  +erythema BLE  Psych:  Mood & affect appropriate.    LABS: All Labs Reviewed:                        12.2   10.08 )-----------( 292      ( 04 Dec 2022 08:30 )             37.3     04 Dec 2022 08:30    142    |  111    |  17     ----------------------------<  155    4.4     |  24     |  1.20     Ca    8.7        04 Dec 2022 08:30  Mg     2.2       04 Dec 2022 08:30    TPro  6.4    /  Alb  2.4    /  TBili  0.4    /  DBili  x      /  AST  17     /  ALT  47     /  AlkPhos  139    04 Dec 2022 08:30    PT/INR - ( 04 Dec 2022 08:30 )   PT: 11.6 sec;   INR: 0.99 ratio     PTT - ( 04 Dec 2022 08:30 )  PTT:27.7 sec  Blood Culture:   12-04 @ 08:30  Pro Bnp 2037    RADIOLOGY:    ACC: [id number] EXAM:  CT ABDOMEN AND PELVIS IC                        ACC: [id number] EXAM:  CT ANGIO CHEST PULM ART [facility]                          PROCEDURE DATE:  12/04/2022          INTERPRETATION:  CLINICAL INFORMATION: Recent cholecystectomy, abdominal   pain, fever, shortness of breath, leg swelling.    COMPARISON: None.    CONTRAST/COMPLICATIONS:  IV Contrast: Omnipaque 350 (accession [id number]), IV contrast documented   in associated exam (accession [id number])  90 cc administered (accession   [id number]), 0 cc administered (accession [id number])   10 cc discarded   (accession [id number]), 0 cc discarded (accession [id number])  Oral Contrast: NONE  Complications: None reported at time of study completion    PROCEDURE:  CT Angiography of the Chestwas performed followed by portal venous phase   imaging of the Abdomen and Pelvis.  Sagittal and coronal reformats were performed as well as 3D (MIP)   reconstructions.    FINDINGS:    CHEST:  LUNGS AND LARGE AIRWAYS: Central airways are patent. Leftlower lobe   partial atelectasis. Superimposed infection cannot be excluded. Minimal   right dependent lung scarring.  PLEURA: No pleural effusion or pneumothorax.  VESSELS: No pulmonary embolus. Main pulmonary artery is enlarged,   measuring 3.3 cm.This may reflect pulmonary arterial hypertension.  HEART: Heart size is qualitatively within normal limits. Aortic valve and   mitral annular calcification. Coronary artery calcification. Trace   pericardial fluid.  MEDIASTINUM AND AIMEE: Small volumenodes or pneumothorax. Esophagus is   nondistended.  CHEST WALL AND LOWER NECK: Elevated left hemidiaphragm. No chest wall   hematoma. Visualized thyroid is unremarkable.    ABDOMEN AND PELVIS:  LIVER: Liver size is borderline enlarged, 18 cm in craniocaudal dimension.  BILE DUCTS: Mild central biliary distention, measuring up to 1 cm, which   may be reflective of post cholecystectomy status. Correlate with LFTs and   MRCP as warranted.  GALLBLADDER: Cholecystectomy. Trace fluid at the gallbladder fossa. No   loculated fluid collection.  SPLEEN: Spleen size within normal limits  PANCREAS: No acute peripancreatic inflammation  ADRENALS: Slight thickening of the adrenals.  KIDNEYS/URETERS: No hydronephrosis.    BLADDER: Mildly distended.  REPRODUCTIVE ORGANS: Hysterectomy.    BOWEL: Limited evaluation due to lack of oral contrast. Small to moderate   hiatal hernia. Stomach is underdistended. No small bowel distention. No   secondary sign of acute appendicitis. Mild stool burn of the colon limits   evaluation of the likely cause.  PERITONEUM: Trace fluid  VESSELS: No abdominal aortic aneurysm. Atheromatous changes.  RETROPERITONEUM/LYMPH NODES: Small volume nodes.  ABDOMINAL WALL: Postsurgical changes.  BONES: Osseous demineralization and multilevel degenerative changes of   bones. Scoliotic curvature of the spine. Central canal and neural   foramina are not adequately assessed on this study.    IMPRESSION:    CHEST:  1. No pulmonary embolus. Main pulmonary artery enlargement may reflect   pulmonary arterial hypertension.  2. Elevated left hemidiaphragm and partial left lower lobe atelectasis.   Superimposed infection cannot be excluded. Follow to resolution with   repeat chest CT in one month, or sooner as clinically warranted.  3. Aortic valve and coronary artery calcifications.    ABDOMEN/PELVIS:  1. Cholecystectomy. No loculated fluid collection along the gallbladder   fossa.  2. Mild central biliary distention may be due to postcholecystectomy   status. Correlate with LFTs and clinical status of the patient.    --- End of Report ---    SUREKHA YIN M.D., ATTENDING RADIOLOGIST  This document has been electronically signed. Dec  4 2022 12:17PM    EKG: NSR with poor baseline, no ischemic changes

## 2022-12-05 LAB
A1C WITH ESTIMATED AVERAGE GLUCOSE RESULT: 6.5 % — HIGH (ref 4–5.6)
ALBUMIN SERPL ELPH-MCNC: 2.5 G/DL — LOW (ref 3.3–5)
ALP SERPL-CCNC: 132 U/L — HIGH (ref 40–120)
ALT FLD-CCNC: 42 U/L — SIGNIFICANT CHANGE UP (ref 12–78)
ANION GAP SERPL CALC-SCNC: 8 MMOL/L — SIGNIFICANT CHANGE UP (ref 5–17)
APPEARANCE UR: CLEAR — SIGNIFICANT CHANGE UP
AST SERPL-CCNC: 16 U/L — SIGNIFICANT CHANGE UP (ref 15–37)
BASOPHILS # BLD AUTO: 0.12 K/UL — SIGNIFICANT CHANGE UP (ref 0–0.2)
BASOPHILS NFR BLD AUTO: 1.4 % — SIGNIFICANT CHANGE UP (ref 0–2)
BILIRUB SERPL-MCNC: 0.4 MG/DL — SIGNIFICANT CHANGE UP (ref 0.2–1.2)
BILIRUB UR-MCNC: NEGATIVE — SIGNIFICANT CHANGE UP
BUN SERPL-MCNC: 14 MG/DL — SIGNIFICANT CHANGE UP (ref 7–23)
CALCIUM SERPL-MCNC: 8.6 MG/DL — SIGNIFICANT CHANGE UP (ref 8.5–10.1)
CHLORIDE SERPL-SCNC: 109 MMOL/L — HIGH (ref 96–108)
CHOLEST SERPL-MCNC: 154 MG/DL — SIGNIFICANT CHANGE UP
CO2 SERPL-SCNC: 23 MMOL/L — SIGNIFICANT CHANGE UP (ref 22–31)
COLOR SPEC: YELLOW — SIGNIFICANT CHANGE UP
CREAT SERPL-MCNC: 1.1 MG/DL — SIGNIFICANT CHANGE UP (ref 0.5–1.3)
DIFF PNL FLD: NEGATIVE — SIGNIFICANT CHANGE UP
EGFR: 51 ML/MIN/1.73M2 — LOW
EOSINOPHIL # BLD AUTO: 0.1 K/UL — SIGNIFICANT CHANGE UP (ref 0–0.5)
EOSINOPHIL NFR BLD AUTO: 1.2 % — SIGNIFICANT CHANGE UP (ref 0–6)
ESTIMATED AVERAGE GLUCOSE: 140 MG/DL — HIGH (ref 68–114)
GLUCOSE SERPL-MCNC: 135 MG/DL — HIGH (ref 70–99)
GLUCOSE UR QL: NEGATIVE — SIGNIFICANT CHANGE UP
HCT VFR BLD CALC: 39.5 % — SIGNIFICANT CHANGE UP (ref 34.5–45)
HDLC SERPL-MCNC: 40 MG/DL — LOW
HGB BLD-MCNC: 12.3 G/DL — SIGNIFICANT CHANGE UP (ref 11.5–15.5)
IMM GRANULOCYTES NFR BLD AUTO: 4.7 % — HIGH (ref 0–0.9)
KETONES UR-MCNC: NEGATIVE — SIGNIFICANT CHANGE UP
LEGIONELLA AG UR QL: NEGATIVE — SIGNIFICANT CHANGE UP
LEUKOCYTE ESTERASE UR-ACNC: NEGATIVE — SIGNIFICANT CHANGE UP
LIPID PNL WITH DIRECT LDL SERPL: 93 MG/DL — SIGNIFICANT CHANGE UP
LYMPHOCYTES # BLD AUTO: 1.12 K/UL — SIGNIFICANT CHANGE UP (ref 1–3.3)
LYMPHOCYTES # BLD AUTO: 13.2 % — SIGNIFICANT CHANGE UP (ref 13–44)
MAGNESIUM SERPL-MCNC: 2.1 MG/DL — SIGNIFICANT CHANGE UP (ref 1.6–2.6)
MCHC RBC-ENTMCNC: 29.7 PG — SIGNIFICANT CHANGE UP (ref 27–34)
MCHC RBC-ENTMCNC: 31.1 GM/DL — LOW (ref 32–36)
MCV RBC AUTO: 95.4 FL — SIGNIFICANT CHANGE UP (ref 80–100)
MONOCYTES # BLD AUTO: 0.82 K/UL — SIGNIFICANT CHANGE UP (ref 0–0.9)
MONOCYTES NFR BLD AUTO: 9.7 % — SIGNIFICANT CHANGE UP (ref 2–14)
NEUTROPHILS # BLD AUTO: 5.93 K/UL — SIGNIFICANT CHANGE UP (ref 1.8–7.4)
NEUTROPHILS NFR BLD AUTO: 69.8 % — SIGNIFICANT CHANGE UP (ref 43–77)
NITRITE UR-MCNC: NEGATIVE — SIGNIFICANT CHANGE UP
NON HDL CHOLESTEROL: 114 MG/DL — SIGNIFICANT CHANGE UP
NRBC # BLD: 0 /100 WBCS — SIGNIFICANT CHANGE UP (ref 0–0)
PH UR: 6.5 — SIGNIFICANT CHANGE UP (ref 5–8)
PHOSPHATE SERPL-MCNC: 2.5 MG/DL — SIGNIFICANT CHANGE UP (ref 2.5–4.5)
PLATELET # BLD AUTO: 276 K/UL — SIGNIFICANT CHANGE UP (ref 150–400)
POTASSIUM SERPL-MCNC: 4 MMOL/L — SIGNIFICANT CHANGE UP (ref 3.5–5.3)
POTASSIUM SERPL-SCNC: 4 MMOL/L — SIGNIFICANT CHANGE UP (ref 3.5–5.3)
PROT SERPL-MCNC: 6.8 G/DL — SIGNIFICANT CHANGE UP (ref 6–8.3)
PROT UR-MCNC: NEGATIVE — SIGNIFICANT CHANGE UP
RBC # BLD: 4.14 M/UL — SIGNIFICANT CHANGE UP (ref 3.8–5.2)
RBC # FLD: 14.2 % — SIGNIFICANT CHANGE UP (ref 10.3–14.5)
SODIUM SERPL-SCNC: 140 MMOL/L — SIGNIFICANT CHANGE UP (ref 135–145)
SP GR SPEC: 1.01 — SIGNIFICANT CHANGE UP (ref 1.01–1.02)
TRIGL SERPL-MCNC: 105 MG/DL — SIGNIFICANT CHANGE UP
UROBILINOGEN FLD QL: NEGATIVE — SIGNIFICANT CHANGE UP
WBC # BLD: 8.49 K/UL — SIGNIFICANT CHANGE UP (ref 3.8–10.5)
WBC # FLD AUTO: 8.49 K/UL — SIGNIFICANT CHANGE UP (ref 3.8–10.5)

## 2022-12-05 PROCEDURE — 99233 SBSQ HOSP IP/OBS HIGH 50: CPT

## 2022-12-05 PROCEDURE — 93306 TTE W/DOPPLER COMPLETE: CPT | Mod: 26

## 2022-12-05 RX ORDER — INFLUENZA VIRUS VACCINE 15; 15; 15; 15 UG/.5ML; UG/.5ML; UG/.5ML; UG/.5ML
0.7 SUSPENSION INTRAMUSCULAR ONCE
Refills: 0 | Status: DISCONTINUED | OUTPATIENT
Start: 2022-12-05 | End: 2022-12-09

## 2022-12-05 RX ORDER — INSULIN LISPRO 100/ML
VIAL (ML) SUBCUTANEOUS EVERY 6 HOURS
Refills: 0 | Status: DISCONTINUED | OUTPATIENT
Start: 2022-12-05 | End: 2022-12-07

## 2022-12-05 RX ADMIN — DULOXETINE HYDROCHLORIDE 30 MILLIGRAM(S): 30 CAPSULE, DELAYED RELEASE ORAL at 16:44

## 2022-12-05 RX ADMIN — ATORVASTATIN CALCIUM 20 MILLIGRAM(S): 80 TABLET, FILM COATED ORAL at 21:56

## 2022-12-05 RX ADMIN — Medication 40 MILLIGRAM(S): at 06:16

## 2022-12-05 RX ADMIN — Medication 100 MILLIGRAM(S): at 16:43

## 2022-12-05 RX ADMIN — LISINOPRIL 20 MILLIGRAM(S): 2.5 TABLET ORAL at 06:15

## 2022-12-05 RX ADMIN — AZITHROMYCIN 255 MILLIGRAM(S): 500 TABLET, FILM COATED ORAL at 08:16

## 2022-12-05 RX ADMIN — CEFTRIAXONE 100 MILLIGRAM(S): 500 INJECTION, POWDER, FOR SOLUTION INTRAMUSCULAR; INTRAVENOUS at 07:30

## 2022-12-05 RX ADMIN — Medication 100 MILLIGRAM(S): at 06:16

## 2022-12-05 RX ADMIN — Medication 12.5 MILLIGRAM(S): at 06:15

## 2022-12-05 RX ADMIN — Medication 1: at 12:15

## 2022-12-05 RX ADMIN — Medication 1: at 09:23

## 2022-12-05 RX ADMIN — ENOXAPARIN SODIUM 40 MILLIGRAM(S): 100 INJECTION SUBCUTANEOUS at 07:35

## 2022-12-05 RX ADMIN — Medication 100 MILLIGRAM(S): at 21:56

## 2022-12-05 NOTE — PHYSICAL THERAPY INITIAL EVALUATION ADULT - ADDITIONAL COMMENTS
Pt report was independent and community ambulator prior to procedure last week.  However since then pt has been in bed.  Pt report has a straight cane and commode at home.  Pt lives in a house w/ stairs/rail.

## 2022-12-05 NOTE — PROGRESS NOTE ADULT - ASSESSMENT
76 y/o F with no cardiac Hx, s/p cholecystectomy 1 week ago presented to the ED c/o fever and some abdominal tenderness, found to have main pulmonary enlargement and possible volume overload.  Also noted to have possible cellulitis with superimposed edema of BLE.    Volume Overload/Edema  - No known Hx of CAD or HF  - Pro-BNP is elevated at 2000 and has BLE edema with concurrent cellulitis  - s/p Lasix 40 mg IV, would continue today, pt appears vol OL  - Strict I/O's and daily weights  - Obtain TTE to assess cardiac structures.    - CT chest suggestive of pHTN.  Also has cardiac murmur of mild-moderate in significance    - EKG with poor baseline, appears SR without sig ischemia. repeat EKG pending    - Sepsis w/u per Primary  78 y/o F with no cardiac Hx, s/p cholecystectomy 1 week ago presented to the ED c/o fever and some abdominal tenderness, found to have main pulmonary enlargement and possible volume overload.  Also noted to have possible cellulitis with superimposed edema of BLE.    Volume Overload/Edema  - No known Hx of CAD or HF  - Pro-BNP is elevated at 2000 and has BLE edema with concurrent cellulitis  - s/p Lasix 40 mg IV, would continue today, pt appears vol OL  - Strict I/O's and daily weights  - Obtain TTE to assess cardiac structures.    - CT chest suggestive of pHTN.  Also has cardiac murmur of mild-moderate in significance    - EKG with poor baseline, appears SR without sig ischemia. repeat EKG pending    - Sepsis w/u per Primary

## 2022-12-05 NOTE — CONSULT NOTE ADULT - SUBJECTIVE AND OBJECTIVE BOX
[facility], Division of Infectious Diseases  JERRY Bee S. Shah, Y. Patel, G. Riki  [phone]  ([phone] - weekdays after 5pm and weekends)    OMA SPRING  77y, Female  [id number]    HPI--  HPI:  76 y/o F PMHx of dementia, HTN, HLD s/p cholecystectomy 1 week ago who presented w/ SOB and abd discomfort. Patient unable to provide history therefore pt's daughter at bedside provided history. Daughter reports pt underwent cholecystectomy last wednesday. States after patient returned home has been having persistent cough and SOB. Per daughter patient off Lasix for 2 weeks since her surgery. Patient does not endorse any other symptoms.    ROS: 10 point review of systems completed, pertinent positives and negatives as per HPI.    Allergies: penicillin (Hives)    PMH -- Diabetes mellitus    Hyperlipidemia    Hypertension      PSH -- S/P cholecystectomy      FH -- No pertinent family history in first degree relatives      Social History -- denies tobacco, alcohol or illicit drug use    Physical Exam--  Vital Signs Last 24 Hrs  T(F): 98 (05 Dec 2022 12:42), Max: 98.6 (04 Dec 2022 21:24)  HR: 75 (05 Dec 2022 12:42) (70 - 85)  BP: 135/80 (05 Dec 2022 12:42) (135/80 - 190/84)  RR: 18 (05 Dec 2022 12:42) (16 - 18)  SpO2: 95% (05 Dec 2022 12:42) (95% - 100%)  General: nontoxic-appearing, no acute distress  HEENT: NC/AT, anicteric  Neck: Not rigid. No LAD  Lungs: Clear bilaterally without rales  Heart: S1, S2, normal rate  Abdomen: Soft. Nondistended. Nontender.   Back: No costovertebral angle tenderness.  Extremities: trace LE edema. b/l venous stasis dermatitis  Skin: Warm. Dry. No rash.  Psychiatric: Appropriate affect and mood for situation.   Lines:    Laboratory & Imaging Data--  CBC:                       12.3   8.49  )-----------( 276      ( 05 Dec 2022 05:37 )             39.5     WBC Count: 8.49 K/uL (22 @ 05:37)  WBC Count: 10.08 K/uL (22 @ 08:30)    CMP:     140  |  109<H>  |  14  ----------------------------<  135<H>  4.0   |  23  |  1.10    Ca    8.6      05 Dec 2022 05:37  Phos  2.5       Mg     2.1         TPro  6.8  /  Alb  2.5<L>  /  TBili  0.4  /  DBili  x   /  AST  16  /  ALT  42  /  AlkPhos  132<H>      LIVER FUNCTIONS - ( 05 Dec 2022 05:37 )  Alb: 2.5 g/dL / Pro: 6.8 g/dL / ALK PHOS: 132 U/L / ALT: 42 U/L / AST: 16 U/L / GGT: x           Urinalysis Basic - ( 05 Dec 2022 08:11 )    Color: Yellow / Appearance: Clear / S.010 / pH: x  Gluc: x / Ketone: Negative  / Bili: Negative / Urobili: Negative   Blood: x / Protein: Negative / Nitrite: Negative   Leuk Esterase: Negative / RBC: x / WBC x   Sq Epi: x / Non Sq Epi: x / Bacteria: x      Microbiology:     Culture - Blood (collected 22 @ 08:40)  Source: .Blood Blood-Peripheral  Preliminary Report (22 @ 13:02):    No growth to date.    Culture - Blood (collected 22 @ 08:30)  Source: .Blood Blood-Peripheral  Preliminary Report (22 @ 13:02):    No growth to date.        Radiology--  ***  Active Medications--  acetaminophen     Tablet .. 650 milliGRAM(s) Oral every 6 hours PRN  atorvastatin 20 milliGRAM(s) Oral at bedtime  azithromycin  IVPB 500 milliGRAM(s) IV Intermittent every 24 hours  cefTRIAXone   IVPB 1000 milliGRAM(s) IV Intermittent every 24 hours  dextrose 5%. 1000 milliLiter(s) IV Continuous <Continuous>  dextrose 5%. 1000 milliLiter(s) IV Continuous <Continuous>  dextrose 50% Injectable 25 Gram(s) IV Push once  dextrose 50% Injectable 12.5 Gram(s) IV Push once  dextrose 50% Injectable 25 Gram(s) IV Push once  dextrose Oral Gel 15 Gram(s) Oral once PRN  DULoxetine 30 milliGRAM(s) Oral daily  enoxaparin Injectable 40 milliGRAM(s) SubCutaneous every 24 hours  furosemide   Injectable 40 milliGRAM(s) IV Push daily  glucagon  Injectable 1 milliGRAM(s) IntraMuscular once  influenza  Vaccine (HIGH DOSE) 0.7 milliLiter(s) IntraMuscular once  insulin lispro (ADMELOG) corrective regimen sliding scale   SubCutaneous every 6 hours  lisinopril 20 milliGRAM(s) Oral daily  metoprolol succinate ER 12.5 milliGRAM(s) Oral daily  metroNIDAZOLE  IVPB 500 milliGRAM(s) IV Intermittent every 8 hours  ondansetron Injectable 4 milliGRAM(s) IV Push every 8 hours PRN  traMADol 50 milliGRAM(s) Oral once PRN    Antimicrobials:   azithromycin  IVPB 500 milliGRAM(s) IV Intermittent every 24 hours  cefTRIAXone   IVPB 1000 milliGRAM(s) IV Intermittent every 24 hours  metroNIDAZOLE  IVPB 500 milliGRAM(s) IV Intermittent every 8 hours    Immunologic: influenza  Vaccine (HIGH DOSE) 0.7 milliLiter(s) IntraMuscular once     [facility], Division of Infectious Diseases  JERRY Bee S. Shah, Y. Patel, G. Riki  [phone]  ([phone] - weekdays after 5pm and weekends)    OMA SPRING  77y, Female  [id number]    HPI--  HPI:  76 y/o F PMHx of dementia, HTN, HLD s/p cholecystectomy 1 week ago who presented w/ SOB and abd discomfort. Patient unable to provide history therefore pt's daughter at bedside provided history. Daughter reports pt underwent cholecystectomy last wednesday. States after patient returned home has been having persistent cough and SOB. Per daughter patient off Lasix for 2 weeks since her surgery. Patient does not endorse any other symptoms.    ROS: 10 point review of systems completed, pertinent positives and negatives as per HPI.    Allergies: penicillin (Hives)    PMH -- Diabetes mellitus    Hyperlipidemia    Hypertension      PSH -- S/P cholecystectomy      FH -- No pertinent family history in first degree relatives      Social History -- denies tobacco, alcohol or illicit drug use    Physical Exam--  Vital Signs Last 24 Hrs  T(F): 98 (05 Dec 2022 12:42), Max: 98.6 (04 Dec 2022 21:24)  HR: 75 (05 Dec 2022 12:42) (70 - 85)  BP: 135/80 (05 Dec 2022 12:42) (135/80 - 190/84)  RR: 18 (05 Dec 2022 12:42) (16 - 18)  SpO2: 95% (05 Dec 2022 12:42) (95% - 100%)  General: nontoxic-appearing, no acute distress  HEENT: NC/AT, anicteric  Neck: Not rigid. No LAD  Lungs: Clear bilaterally without rales  Heart: S1, S2, normal rate  Abdomen: Soft. Nondistended. Nontender.   Back: No costovertebral angle tenderness.  Extremities: trace LE edema. b/l venous stasis dermatitis  Skin: Warm. Dry. No rash.  Psychiatric: Appropriate affect and mood for situation.   Lines:    Laboratory & Imaging Data--  CBC:                       12.3   8.49  )-----------( 276      ( 05 Dec 2022 05:37 )             39.5     WBC Count: 8.49 K/uL (22 @ 05:37)  WBC Count: 10.08 K/uL (22 @ 08:30)    CMP:     140  |  109<H>  |  14  ----------------------------<  135<H>  4.0   |  23  |  1.10    Ca    8.6      05 Dec 2022 05:37  Phos  2.5       Mg     2.1         TPro  6.8  /  Alb  2.5<L>  /  TBili  0.4  /  DBili  x   /  AST  16  /  ALT  42  /  AlkPhos  132<H>      LIVER FUNCTIONS - ( 05 Dec 2022 05:37 )  Alb: 2.5 g/dL / Pro: 6.8 g/dL / ALK PHOS: 132 U/L / ALT: 42 U/L / AST: 16 U/L / GGT: x           Urinalysis Basic - ( 05 Dec 2022 08:11 )    Color: Yellow / Appearance: Clear / S.010 / pH: x  Gluc: x / Ketone: Negative  / Bili: Negative / Urobili: Negative   Blood: x / Protein: Negative / Nitrite: Negative   Leuk Esterase: Negative / RBC: x / WBC x   Sq Epi: x / Non Sq Epi: x / Bacteria: x      Microbiology:     Culture - Blood (collected 22 @ 08:40)  Source: .Blood Blood-Peripheral  Preliminary Report (22 @ 13:02):    No growth to date.    Culture - Blood (collected 22 @ 08:30)  Source: .Blood Blood-Peripheral  Preliminary Report (22 @ 13:02):    No growth to date.        Radiology--  ***  Active Medications--  acetaminophen     Tablet .. 650 milliGRAM(s) Oral every 6 hours PRN  atorvastatin 20 milliGRAM(s) Oral at bedtime  azithromycin  IVPB 500 milliGRAM(s) IV Intermittent every 24 hours  cefTRIAXone   IVPB 1000 milliGRAM(s) IV Intermittent every 24 hours  dextrose 5%. 1000 milliLiter(s) IV Continuous <Continuous>  dextrose 5%. 1000 milliLiter(s) IV Continuous <Continuous>  dextrose 50% Injectable 25 Gram(s) IV Push once  dextrose 50% Injectable 12.5 Gram(s) IV Push once  dextrose 50% Injectable 25 Gram(s) IV Push once  dextrose Oral Gel 15 Gram(s) Oral once PRN  DULoxetine 30 milliGRAM(s) Oral daily  enoxaparin Injectable 40 milliGRAM(s) SubCutaneous every 24 hours  furosemide   Injectable 40 milliGRAM(s) IV Push daily  glucagon  Injectable 1 milliGRAM(s) IntraMuscular once  influenza  Vaccine (HIGH DOSE) 0.7 milliLiter(s) IntraMuscular once  insulin lispro (ADMELOG) corrective regimen sliding scale   SubCutaneous every 6 hours  lisinopril 20 milliGRAM(s) Oral daily  metoprolol succinate ER 12.5 milliGRAM(s) Oral daily  metroNIDAZOLE  IVPB 500 milliGRAM(s) IV Intermittent every 8 hours  ondansetron Injectable 4 milliGRAM(s) IV Push every 8 hours PRN  traMADol 50 milliGRAM(s) Oral once PRN    Antimicrobials:   azithromycin  IVPB 500 milliGRAM(s) IV Intermittent every 24 hours  cefTRIAXone   IVPB 1000 milliGRAM(s) IV Intermittent every 24 hours  metroNIDAZOLE  IVPB 500 milliGRAM(s) IV Intermittent every 8 hours    Immunologic: influenza  Vaccine (HIGH DOSE) 0.7 milliLiter(s) IntraMuscular once     [facility], Division of Infectious Diseases  JERRY Bee S. Shah, Y. Patel, G. Riki  [phone]  ([phone] - weekdays after 5pm and weekends)    OMA SPRING  77y, Female  [id number]    HPI--  HPI:  76 y/o F PMHx of dementia, HTN, HLD s/p cholecystectomy 1 week ago who presented w/ SOB and abd discomfort. Patient unable to provide history therefore pt's daughter at bedside provided history. Daughter reports pt underwent cholecystectomy last wednesday. States after patient returned home has been having persistent cough and SOB. Per daughter patient off Lasix for 2 weeks since her surgery. Patient does not endorse any other symptoms.    ROS: 10 point review of systems completed, pertinent positives and negatives as per HPI.    Allergies: penicillin (Hives)    PMH -- Diabetes mellitus    Hyperlipidemia    Hypertension      PSH -- S/P cholecystectomy      FH -- No pertinent family history in first degree relatives      Social History -- denies tobacco, alcohol or illicit drug use    Physical Exam--  Vital Signs Last 24 Hrs  T(F): 98 (05 Dec 2022 12:42), Max: 98.6 (04 Dec 2022 21:24)  HR: 75 (05 Dec 2022 12:42) (70 - 85)  BP: 135/80 (05 Dec 2022 12:42) (135/80 - 190/84)  RR: 18 (05 Dec 2022 12:42) (16 - 18)  SpO2: 95% (05 Dec 2022 12:42) (95% - 100%)  General: nontoxic-appearing, no acute distress  HEENT: NC/AT, anicteric  Neck: Not rigid. No LAD  Lungs: Clear bilaterally without rales  Heart: S1, S2, normal rate  Abdomen: Soft. Nondistended. Nontender.   Back: No costovertebral angle tenderness.  Extremities: trace LE edema. b/l venous stasis dermatitis  Skin: Warm. Dry. No rash.  Psychiatric: Appropriate affect and mood for situation.   Lines:    Laboratory & Imaging Data--  CBC:                       12.3   8.49  )-----------( 276      ( 05 Dec 2022 05:37 )             39.5     WBC Count: 8.49 K/uL (22 @ 05:37)  WBC Count: 10.08 K/uL (22 @ 08:30)    CMP:     140  |  109<H>  |  14  ----------------------------<  135<H>  4.0   |  23  |  1.10    Ca    8.6      05 Dec 2022 05:37  Phos  2.5       Mg     2.1         TPro  6.8  /  Alb  2.5<L>  /  TBili  0.4  /  DBili  x   /  AST  16  /  ALT  42  /  AlkPhos  132<H>      LIVER FUNCTIONS - ( 05 Dec 2022 05:37 )  Alb: 2.5 g/dL / Pro: 6.8 g/dL / ALK PHOS: 132 U/L / ALT: 42 U/L / AST: 16 U/L / GGT: x           Urinalysis Basic - ( 05 Dec 2022 08:11 )    Color: Yellow / Appearance: Clear / S.010 / pH: x  Gluc: x / Ketone: Negative  / Bili: Negative / Urobili: Negative   Blood: x / Protein: Negative / Nitrite: Negative   Leuk Esterase: Negative / RBC: x / WBC x   Sq Epi: x / Non Sq Epi: x / Bacteria: x      Microbiology:     Culture - Blood (collected 22 @ 08:40)  Source: .Blood Blood-Peripheral  Preliminary Report (22 @ 13:02):    No growth to date.    Culture - Blood (collected 22 @ 08:30)  Source: .Blood Blood-Peripheral  Preliminary Report (22 @ 13:02):    No growth to date.        Radiology--  ***  Active Medications--  acetaminophen     Tablet .. 650 milliGRAM(s) Oral every 6 hours PRN  atorvastatin 20 milliGRAM(s) Oral at bedtime  azithromycin  IVPB 500 milliGRAM(s) IV Intermittent every 24 hours  cefTRIAXone   IVPB 1000 milliGRAM(s) IV Intermittent every 24 hours  dextrose 5%. 1000 milliLiter(s) IV Continuous <Continuous>  dextrose 5%. 1000 milliLiter(s) IV Continuous <Continuous>  dextrose 50% Injectable 25 Gram(s) IV Push once  dextrose 50% Injectable 12.5 Gram(s) IV Push once  dextrose 50% Injectable 25 Gram(s) IV Push once  dextrose Oral Gel 15 Gram(s) Oral once PRN  DULoxetine 30 milliGRAM(s) Oral daily  enoxaparin Injectable 40 milliGRAM(s) SubCutaneous every 24 hours  furosemide   Injectable 40 milliGRAM(s) IV Push daily  glucagon  Injectable 1 milliGRAM(s) IntraMuscular once  influenza  Vaccine (HIGH DOSE) 0.7 milliLiter(s) IntraMuscular once  insulin lispro (ADMELOG) corrective regimen sliding scale   SubCutaneous every 6 hours  lisinopril 20 milliGRAM(s) Oral daily  metoprolol succinate ER 12.5 milliGRAM(s) Oral daily  metroNIDAZOLE  IVPB 500 milliGRAM(s) IV Intermittent every 8 hours  ondansetron Injectable 4 milliGRAM(s) IV Push every 8 hours PRN  traMADol 50 milliGRAM(s) Oral once PRN    Antimicrobials:   azithromycin  IVPB 500 milliGRAM(s) IV Intermittent every 24 hours  cefTRIAXone   IVPB 1000 milliGRAM(s) IV Intermittent every 24 hours  metroNIDAZOLE  IVPB 500 milliGRAM(s) IV Intermittent every 8 hours    Immunologic: influenza  Vaccine (HIGH DOSE) 0.7 milliLiter(s) IntraMuscular once

## 2022-12-05 NOTE — PROGRESS NOTE ADULT - ASSESSMENT
78 y/o F PMHx of dementia, T2DM, HTN, HLD, s/p cholecystectomy 1 week ago presented to the ED by ambulance for fever at home, abd discomfort/pain, and cough since last night 12/3 with CT showing mild central biliary distention admitted for cellulitis, PNA, possible choledocholithiasis s/p lap cholecystectomy and apparent volume overload. 76 y/o F PMHx of dementia, T2DM, HTN, HLD, s/p cholecystectomy 1 week ago presented to the ED by ambulance for fever at home, abd discomfort/pain, and cough since last night 12/3 with CT showing mild central biliary distention admitted for cellulitis, PNA, possible choledocholithiasis s/p lap cholecystectomy and apparent volume overload.

## 2022-12-05 NOTE — PHYSICAL THERAPY INITIAL EVALUATION ADULT - PERTINENT HX OF CURRENT PROBLEM, REHAB EVAL
76 y/o F presented to the ED. Pt brought in from home for fever (not documented in ED), abd discomfort, and cough since 12/3. CXR: Lungs are clear with parenchymal density at the left CP angle that may reflect focus of atelectasis. Duplex US BLE - No evidence of DVT. CT PE Chest + Abd and pelvis: Main pulmonary artery enlargement may reflect pulmonary arterial hypertension. Elevated left hemidiaphragm and partial left lower lobe atelectasis. Cholecystectomy. No loculated fluid collection along the gallbladder   fossa. Mild central biliary distention may be due to postcholecystectomy status. 78 y/o F presented to the ED. Pt brought in from home for fever (not documented in ED), abd discomfort, and cough since 12/3. CXR: Lungs are clear with parenchymal density at the left CP angle that may reflect focus of atelectasis. Duplex US BLE - No evidence of DVT. CT PE Chest + Abd and pelvis: Main pulmonary artery enlargement may reflect pulmonary arterial hypertension. Elevated left hemidiaphragm and partial left lower lobe atelectasis. Cholecystectomy. No loculated fluid collection along the gallbladder   fossa. Mild central biliary distention may be due to postcholecystectomy status.

## 2022-12-05 NOTE — PROGRESS NOTE ADULT - ASSESSMENT
cbd dilation  inc lft  sepsis    pl;an  ivf  iv abs  in and out  ppi once a day  may need mrcp/ercp if no source oiof infection is found    Advanced care planning was discussed with patient and family.  Advanced care planning forms were reviewed and discussed.  Risks, benefits and alternatives of gastroenterologic procedures were discussed in detail and all questions were answered.    30 minutes spent.

## 2022-12-05 NOTE — PROGRESS NOTE ADULT - SUBJECTIVE AND OBJECTIVE BOX
Patient is a 77y old  Female who presents with a chief complaint of Fever (04 Dec 2022 15:30)      FROM ADMISSION H+P:   HPI:  76 y/o F PMHx of dementia, diabetes?, HTN, HLD s/p cholecystectomy 1 week ago w/ otherwise unknown medical history presented to the ED by ambulance. History obtained through chart review and patient. Patient is a poor historian and daughter called twice but unable to be reached. Patient allegedly brought in from home for fever (not documented in ED), abd discomfort, and cough since last night 12/3. During interview patient only endorsing lower abdominal pain, patient visibly comfortable and in NAD though rates pain 7/10. Allegedly patient off Lasix for 2 weeks due to prescription issue, no antipyretic used prior to admission. Patient does not endorse any other symptoms.    IN THE ED:  Temp  98.2 F , HR 72 ,  / 60 ,RR 18 , SpO2 97 on RA  S/P: Lasix 40 IVP, 1 x IV azithro, 1 x IV rocephin  EKG: Rate 68, ST&T wave abnormality, likely poor EKG  Labs significant for Glucose 155, albumin 2.4, Alk phos 139, procal .25, BNP 2037  Imaging:  CXR: The lungs are clear with parenchymal density at the left CP angle that may reflect focus of atelectasis  Duplex u/s b/l LE - No evidence of deep venous thrombosis in either lower extremity  CT PE Chest + Abd and pelvis: Main pulmonary artery enlargement may reflect pulmonary arterial hypertension. Elevated left hemidiaphragm and partial left lower lobe atelectasis. Superimposed infection cannot be excluded. Follow to resolution with repeat chest CT in one month, or sooner as clinically warranted. Cholecystectomy. No loculated fluid collection along the gallbladder   fossa. Mild central biliary distention may be due to postcholecystectomy status. (04 Dec 2022 14:20)      INTERVAL HPI/OVERNIGHT EVENTS: Patient was seen and examined. No acute events occurred overnight - had pain in LLE and was given pain medications. Daughter is present at bedside. No new complaints. Denies chest pain, palpitations, dyspnea, headache, dizziness, abdominal pain, n/v/d. Being treated for cellulitis, pneumonia and possible CBD stone post cholecystectomy. Daughter states that she had CCY last Wednesday and was told to watch for vomiting. Noted to be coughing at home for the last few days.    I&O's Summary      PAST MEDICAL & SURGICAL HISTORY:  Diabetes mellitus      Hyperlipidemia      Hypertension      S/P cholecystectomy  2022          LABS:                        12.3   8.49  )-----------( 276      ( 05 Dec 2022 05:37 )             39.5     12-    140  |  109<H>  |  14  ----------------------------<  135<H>  4.0   |  23  |  1.10    Ca    8.6      05 Dec 2022 05:37  Phos  2.5     12-  Mg     2.1     12-    TPro  6.8  /  Alb  2.5<L>  /  TBili  0.4  /  DBili  x   /  AST  16  /  ALT  42  /  AlkPhos  132<H>  12-05    PT/INR - ( 04 Dec 2022 08:30 )   PT: 11.6 sec;   INR: 0.99 ratio         PTT - ( 04 Dec 2022 08:30 )  PTT:27.7 sec  Urinalysis Basic - ( 05 Dec 2022 08:11 )    Color: Yellow / Appearance: Clear / S.010 / pH: x  Gluc: x / Ketone: Negative  / Bili: Negative / Urobili: Negative   Blood: x / Protein: Negative / Nitrite: Negative   Leuk Esterase: Negative / RBC: x / WBC x   Sq Epi: x / Non Sq Epi: x / Bacteria: x      CAPILLARY BLOOD GLUCOSE      POCT Blood Glucose.: 179 mg/dL (05 Dec 2022 12:07)  POCT Blood Glucose.: 187 mg/dL (05 Dec 2022 08:33)  POCT Blood Glucose.: 124 mg/dL (04 Dec 2022 22:00)        Urinalysis Basic - ( 05 Dec 2022 08:11 )    Color: Yellow / Appearance: Clear / S.010 / pH: x  Gluc: x / Ketone: Negative  / Bili: Negative / Urobili: Negative   Blood: x / Protein: Negative / Nitrite: Negative   Leuk Esterase: Negative / RBC: x / WBC x   Sq Epi: x / Non Sq Epi: x / Bacteria: x        MEDICATIONS  (STANDING):  atorvastatin 20 milliGRAM(s) Oral at bedtime  azithromycin  IVPB 500 milliGRAM(s) IV Intermittent every 24 hours  cefTRIAXone   IVPB 1000 milliGRAM(s) IV Intermittent every 24 hours  dextrose 5%. 1000 milliLiter(s) (100 mL/Hr) IV Continuous <Continuous>  dextrose 5%. 1000 milliLiter(s) (50 mL/Hr) IV Continuous <Continuous>  dextrose 50% Injectable 25 Gram(s) IV Push once  dextrose 50% Injectable 12.5 Gram(s) IV Push once  dextrose 50% Injectable 25 Gram(s) IV Push once  DULoxetine 30 milliGRAM(s) Oral daily  enoxaparin Injectable 40 milliGRAM(s) SubCutaneous every 24 hours  furosemide   Injectable 40 milliGRAM(s) IV Push daily  glucagon  Injectable 1 milliGRAM(s) IntraMuscular once  insulin lispro (ADMELOG) corrective regimen sliding scale   SubCutaneous three times a day before meals  insulin lispro (ADMELOG) corrective regimen sliding scale   SubCutaneous at bedtime  lisinopril 20 milliGRAM(s) Oral daily  metoprolol succinate ER 12.5 milliGRAM(s) Oral daily  metroNIDAZOLE  IVPB 500 milliGRAM(s) IV Intermittent every 8 hours    MEDICATIONS  (PRN):  acetaminophen     Tablet .. 650 milliGRAM(s) Oral every 6 hours PRN Temp greater or equal to 38C (100.4F), Mild Pain (1 - 3)  dextrose Oral Gel 15 Gram(s) Oral once PRN Blood Glucose LESS THAN 70 milliGRAM(s)/deciliter  ondansetron Injectable 4 milliGRAM(s) IV Push every 8 hours PRN Nausea and/or Vomiting  traMADol 50 milliGRAM(s) Oral once PRN Severe Pain (7 - 10)      REVIEW OF SYSTEMS:  CONSTITUTIONAL: No fever or chills  HEENT:  No headache, no sore throat  RESPIRATORY: No wheezing, or shortness of breath; admits cough  CARDIOVASCULAR: No chest pain, palpitations  GASTROINTESTINAL: No abdominal pain, nausea, vomiting, or diarrhea  GENITOURINARY: No dysuria, frequency, or hematuria  NEUROLOGICAL: no focal weakness or dizziness  MUSCULOSKELETAL: no myalgias; admits LLE pain, improved now  PSYCH: no recent changes in mood    RADIOLOGY & ADDITIONAL TESTS:    Imaging Personally Reviewed:  [x] YES  [ ] NO    Consultant(s) Notes Reviewed:  [x] YES  [ ] NO    PHYSICAL EXAM:  T(C): 36.3 (22 @ 09:43), Max: 37 (22 @ 21:24)  HR: 70 (22 @ 09:43) (70 - 85)  BP: 147/84 (22 @ 09:43) (147/84 - 190/84)  RR: 18 (22 @ 09:43) (16 - 18)  SpO2: 96% (22 @ 09:43) (96% - 100%)    GENERAL: NAD, well-developed, well-groomed  HEENT:  anicteric, moist mucous membranes  CHEST/LUNG:  decreased breath sounds  HEART:  RRR, S1, S2  ABDOMEN:  BS+, soft, nontender, nondistended  EXTREMITIES: no cyanosis; + peripheral edema  NERVOUS SYSTEM: answers questions and follows commands appropriately  INTEGUMENT: LLE with erythema, warm and tender to palpation  PSYCH: normal affect    Care Discussed with Consultants/Other Providers [x] YES  [ ] NO Patient is a 77y old  Female who presents with a chief complaint of Fever (04 Dec 2022 15:30)      FROM ADMISSION H+P:   HPI:  78 y/o F PMHx of dementia, diabetes?, HTN, HLD s/p cholecystectomy 1 week ago w/ otherwise unknown medical history presented to the ED by ambulance. History obtained through chart review and patient. Patient is a poor historian and daughter called twice but unable to be reached. Patient allegedly brought in from home for fever (not documented in ED), abd discomfort, and cough since last night 12/3. During interview patient only endorsing lower abdominal pain, patient visibly comfortable and in NAD though rates pain 7/10. Allegedly patient off Lasix for 2 weeks due to prescription issue, no antipyretic used prior to admission. Patient does not endorse any other symptoms.    IN THE ED:  Temp  98.2 F , HR 72 ,  / 60 ,RR 18 , SpO2 97 on RA  S/P: Lasix 40 IVP, 1 x IV azithro, 1 x IV rocephin  EKG: Rate 68, ST&T wave abnormality, likely poor EKG  Labs significant for Glucose 155, albumin 2.4, Alk phos 139, procal .25, BNP 2037  Imaging:  CXR: The lungs are clear with parenchymal density at the left CP angle that may reflect focus of atelectasis  Duplex u/s b/l LE - No evidence of deep venous thrombosis in either lower extremity  CT PE Chest + Abd and pelvis: Main pulmonary artery enlargement may reflect pulmonary arterial hypertension. Elevated left hemidiaphragm and partial left lower lobe atelectasis. Superimposed infection cannot be excluded. Follow to resolution with repeat chest CT in one month, or sooner as clinically warranted. Cholecystectomy. No loculated fluid collection along the gallbladder   fossa. Mild central biliary distention may be due to postcholecystectomy status. (04 Dec 2022 14:20)      INTERVAL HPI/OVERNIGHT EVENTS: Patient was seen and examined. No acute events occurred overnight - had pain in LLE and was given pain medications. Daughter is present at bedside. No new complaints. Denies chest pain, palpitations, dyspnea, headache, dizziness, abdominal pain, n/v/d. Being treated for cellulitis, pneumonia and possible CBD stone post cholecystectomy. Daughter states that she had CCY last Wednesday and was told to watch for vomiting. Noted to be coughing at home for the last few days.    I&O's Summary      PAST MEDICAL & SURGICAL HISTORY:  Diabetes mellitus      Hyperlipidemia      Hypertension      S/P cholecystectomy  2022          LABS:                        12.3   8.49  )-----------( 276      ( 05 Dec 2022 05:37 )             39.5     12-    140  |  109<H>  |  14  ----------------------------<  135<H>  4.0   |  23  |  1.10    Ca    8.6      05 Dec 2022 05:37  Phos  2.5     12-  Mg     2.1     12-    TPro  6.8  /  Alb  2.5<L>  /  TBili  0.4  /  DBili  x   /  AST  16  /  ALT  42  /  AlkPhos  132<H>  12-05    PT/INR - ( 04 Dec 2022 08:30 )   PT: 11.6 sec;   INR: 0.99 ratio         PTT - ( 04 Dec 2022 08:30 )  PTT:27.7 sec  Urinalysis Basic - ( 05 Dec 2022 08:11 )    Color: Yellow / Appearance: Clear / S.010 / pH: x  Gluc: x / Ketone: Negative  / Bili: Negative / Urobili: Negative   Blood: x / Protein: Negative / Nitrite: Negative   Leuk Esterase: Negative / RBC: x / WBC x   Sq Epi: x / Non Sq Epi: x / Bacteria: x      CAPILLARY BLOOD GLUCOSE      POCT Blood Glucose.: 179 mg/dL (05 Dec 2022 12:07)  POCT Blood Glucose.: 187 mg/dL (05 Dec 2022 08:33)  POCT Blood Glucose.: 124 mg/dL (04 Dec 2022 22:00)        Urinalysis Basic - ( 05 Dec 2022 08:11 )    Color: Yellow / Appearance: Clear / S.010 / pH: x  Gluc: x / Ketone: Negative  / Bili: Negative / Urobili: Negative   Blood: x / Protein: Negative / Nitrite: Negative   Leuk Esterase: Negative / RBC: x / WBC x   Sq Epi: x / Non Sq Epi: x / Bacteria: x        MEDICATIONS  (STANDING):  atorvastatin 20 milliGRAM(s) Oral at bedtime  azithromycin  IVPB 500 milliGRAM(s) IV Intermittent every 24 hours  cefTRIAXone   IVPB 1000 milliGRAM(s) IV Intermittent every 24 hours  dextrose 5%. 1000 milliLiter(s) (100 mL/Hr) IV Continuous <Continuous>  dextrose 5%. 1000 milliLiter(s) (50 mL/Hr) IV Continuous <Continuous>  dextrose 50% Injectable 25 Gram(s) IV Push once  dextrose 50% Injectable 12.5 Gram(s) IV Push once  dextrose 50% Injectable 25 Gram(s) IV Push once  DULoxetine 30 milliGRAM(s) Oral daily  enoxaparin Injectable 40 milliGRAM(s) SubCutaneous every 24 hours  furosemide   Injectable 40 milliGRAM(s) IV Push daily  glucagon  Injectable 1 milliGRAM(s) IntraMuscular once  insulin lispro (ADMELOG) corrective regimen sliding scale   SubCutaneous three times a day before meals  insulin lispro (ADMELOG) corrective regimen sliding scale   SubCutaneous at bedtime  lisinopril 20 milliGRAM(s) Oral daily  metoprolol succinate ER 12.5 milliGRAM(s) Oral daily  metroNIDAZOLE  IVPB 500 milliGRAM(s) IV Intermittent every 8 hours    MEDICATIONS  (PRN):  acetaminophen     Tablet .. 650 milliGRAM(s) Oral every 6 hours PRN Temp greater or equal to 38C (100.4F), Mild Pain (1 - 3)  dextrose Oral Gel 15 Gram(s) Oral once PRN Blood Glucose LESS THAN 70 milliGRAM(s)/deciliter  ondansetron Injectable 4 milliGRAM(s) IV Push every 8 hours PRN Nausea and/or Vomiting  traMADol 50 milliGRAM(s) Oral once PRN Severe Pain (7 - 10)      REVIEW OF SYSTEMS:  CONSTITUTIONAL: No fever or chills  HEENT:  No headache, no sore throat  RESPIRATORY: No wheezing, or shortness of breath; admits cough  CARDIOVASCULAR: No chest pain, palpitations  GASTROINTESTINAL: No abdominal pain, nausea, vomiting, or diarrhea  GENITOURINARY: No dysuria, frequency, or hematuria  NEUROLOGICAL: no focal weakness or dizziness  MUSCULOSKELETAL: no myalgias; admits LLE pain, improved now  PSYCH: no recent changes in mood    RADIOLOGY & ADDITIONAL TESTS:    Imaging Personally Reviewed:  [x] YES  [ ] NO    Consultant(s) Notes Reviewed:  [x] YES  [ ] NO    PHYSICAL EXAM:  T(C): 36.3 (22 @ 09:43), Max: 37 (22 @ 21:24)  HR: 70 (22 @ 09:43) (70 - 85)  BP: 147/84 (22 @ 09:43) (147/84 - 190/84)  RR: 18 (22 @ 09:43) (16 - 18)  SpO2: 96% (22 @ 09:43) (96% - 100%)    GENERAL: NAD, well-developed, well-groomed  HEENT:  anicteric, moist mucous membranes  CHEST/LUNG:  decreased breath sounds  HEART:  RRR, S1, S2  ABDOMEN:  BS+, soft, nontender, nondistended  EXTREMITIES: no cyanosis; + peripheral edema  NERVOUS SYSTEM: answers questions and follows commands appropriately  INTEGUMENT: LLE with erythema, warm and tender to palpation  PSYCH: normal affect    Care Discussed with Consultants/Other Providers [x] YES  [ ] NO

## 2022-12-05 NOTE — PROGRESS NOTE ADULT - SUBJECTIVE AND OBJECTIVE BOX
INTERVAL HPI/OVERNIGHT EVENTS:  No new overnight event.  No N/V/D.  Tolerating diet.    Allergies    penicillin (Hives)    Intolerances    General:  No wt loss, fevers, chills, night sweats, fatigue,   Eyes:  Good vision, no reported pain  ENT:  No sore throat, pain, runny nose, dysphagia  CV:  No pain, palpitations, hypo/hypertension  Resp:  No dyspnea, cough, tachypnea, wheezing  GI:  No pain, No nausea, No vomiting, No diarrhea, No constipation, No weight loss, No fever, No pruritis, No rectal bleeding, No tarry stools, No dysphagia,  :  No pain, bleeding, incontinence, nocturia  Muscle:  No pain, weakness  Neuro:  No weakness, tingling, memory problems  Psych:  No fatigue, insomnia, mood problems, depression  Endocrine:  No polyuria, polydipsia, cold/heat intolerance  Heme:  No petechiae, ecchymosis, easy bruisability  Skin:  No rash, tattoos, scars, edema      PHYSICAL EXAM:   Vital Signs:  Vital Signs Last 24 Hrs  T(C): 36.7 (05 Dec 2022 12:42), Max: 37 (04 Dec 2022 21:24)  T(F): 98 (05 Dec 2022 12:42), Max: 98.6 (04 Dec 2022 21:24)  HR: 75 (05 Dec 2022 12:42) (70 - 85)  BP: 135/80 (05 Dec 2022 12:42) (135/80 - 190/84)  BP(mean): --  RR: 18 (05 Dec 2022 12:42) (16 - 18)  SpO2: 95% (05 Dec 2022 12:42) (95% - 100%)    Parameters below as of 05 Dec 2022 12:42  Patient On (Oxygen Delivery Method): room air      Daily     Daily I&O's Summary      GENERAL:  Appears stated age, well-groomed, well-nourished, no distress  HEENT:  NC/AT,  conjunctivae clear and pink, no thyromegaly, nodules, adenopathy, no JVD, sclera -anicteric  CHEST:  Full & symmetric excursion, no increased effort, breath sounds clear  HEART:  Regular rhythm, S1, S2, no murmur/rub/S3/S4, no abdominal bruit, no edema  ABDOMEN:  Soft, non-tender, non-distended, normoactive bowel sounds,  no masses ,no hepato-splenomegaly, no signs of chronic liver disease  EXTEREMITIES:  no cyanosis,clubbing or edema  SKIN:  No rash/erythema/ecchymoses/petechiae/wounds/abscess/warm/dry  NEURO:  Alert, oriented, no asterixis, no tremor, no encephalopathy      LABS:                        12.3   8.49  )-----------( 276      ( 05 Dec 2022 05:37 )             39.5     12    140  |  109<H>  |  14  ----------------------------<  135<H>  4.0   |  23  |  1.10    Ca    8.6      05 Dec 2022 05:37  Phos  2.5       Mg     2.1         TPro  6.8  /  Alb  2.5<L>  /  TBili  0.4  /  DBili  x   /  AST  16  /  ALT  42  /  AlkPhos  132<H>  12    PT/INR - ( 04 Dec 2022 08:30 )   PT: 11.6 sec;   INR: 0.99 ratio         PTT - ( 04 Dec 2022 08:30 )  PTT:27.7 sec  Urinalysis Basic - ( 05 Dec 2022 08:11 )    Color: Yellow / Appearance: Clear / S.010 / pH: x  Gluc: x / Ketone: Negative  / Bili: Negative / Urobili: Negative   Blood: x / Protein: Negative / Nitrite: Negative   Leuk Esterase: Negative / RBC: x / WBC x   Sq Epi: x / Non Sq Epi: x / Bacteria: x      amylase   lipaseLipase, Serum: 260 U/L ( @ 08:30)    RADIOLOGY & ADDITIONAL TESTS:

## 2022-12-05 NOTE — PATIENT PROFILE ADULT - FALL HARM RISK - HARM RISK INTERVENTIONS
Assistance with ambulation/Assistance OOB with selected safe patient handling equipment/Communicate Risk of Fall with Harm to all staff/Discuss with provider need for PT consult/Monitor gait and stability/Provide patient with walking aids - walker, cane, crutches/Reinforce activity limits and safety measures with patient and family/Tailored Fall Risk Interventions/Visual Cue: Yellow wristband and red socks/Bed in lowest position, wheels locked, appropriate side rails in place/Call bell, personal items and telephone in reach/Instruct patient to call for assistance before getting out of bed or chair/Non-slip footwear when patient is out of bed/[place] to call system/Physically safe environment - no spills, clutter or unnecessary equipment/Purposeful Proactive Rounding/Room/bathroom lighting operational, light cord in reach Assistance with ambulation/Assistance OOB with selected safe patient handling equipment/Communicate Risk of Fall with Harm to all staff/Discuss with provider need for PT consult/Monitor gait and stability/Provide patient with walking aids - walker, cane, crutches/Reinforce activity limits and safety measures with patient and family/Tailored Fall Risk Interventions/Visual Cue: Yellow wristband and red socks/Bed in lowest position, wheels locked, appropriate side rails in place/Call bell, personal items and telephone in reach/Instruct patient to call for assistance before getting out of bed or chair/Non-slip footwear when patient is out of bed/[place] to call system/Physically safe environment - no spills, clutter or unnecessary equipment/Purposeful Proactive Rounding/Room/bathroom lighting operational, light cord in reach Assistance with ambulation/Assistance OOB with selected safe patient handling equipment/Communicate Risk of Fall with Harm to all staff/Discuss with provider need for PT consult/Monitor gait and stability/Provide patient with walking aids - walker, cane, crutches/Reinforce activity limits and safety measures with patient and family/Tailored Fall Risk Interventions/Visual Cue: Yellow wristband and red socks/Bed in lowest position, wheels locked, appropriate side rails in place/Call bell, personal items and telephone in reach/Instruct patient to call for assistance before getting out of bed or chair/Non-slip footwear when patient is out of bed/[place] to call system/Physically safe environment - no spills, clutter or unnecessary equipment/Purposeful Proactive Rounding/Room/bathroom lighting operational, light cord in reach

## 2022-12-05 NOTE — CONSULT NOTE ADULT - ASSESSMENT
76 y/o F PMHx of dementia, HTN, HLD s/p cholecystectomy 1 week ago who presented w/ SOB and abd discomfort.     SOB, abd pain  US LE neg for DVT  s/p CTA- 1. No pulmonary embolus. Elevated left hemidiaphragm and partial left lower lobe atelectasis.   Superimposed infection cannot be excluded  s/p CT abd/pelvis- cholecystectomy. No loculated fluid collection along the gallbladder fossa. Mild central biliary distention   f/u blood cx- NGTD  MRCP pending  f/u TTE  procal <.5  low suspicion for bacterial PNA at this time  would discontinue azithromycin  ok to continue ceftriaxone/ flagyl while work-up pending    ?cellulitis  daughter reports LLE shin w/ bright red erythema which has improved since starting antibiotics  no erythema present at this time  c/w ceftriaxone for now    Rl Lyn M.D.  [facility], Division of Infectious Diseases  [phone]  After 5pm on weekdays and all day on weekends - please call [phone] 76 y/o F PMHx of dementia, HTN, HLD s/p cholecystectomy 1 week ago who presented w/ SOB and abd discomfort.     SOB, abd pain  US LE neg for DVT  s/p CTA- 1. No pulmonary embolus. Elevated left hemidiaphragm and partial left lower lobe atelectasis.   Superimposed infection cannot be excluded  s/p CT abd/pelvis- cholecystectomy. No loculated fluid collection along the gallbladder fossa. Mild central biliary distention   f/u blood cx- NGTD  MRCP pending  f/u TTE  procal <.5  low suspicion for bacterial PNA at this time  would discontinue azithromycin  ok to continue ceftriaxone/ flagyl while work-up pending    ?cellulitis  daughter reports LLE shin w/ bright red erythema which has improved since starting antibiotics  no erythema present at this time  c/w ceftriaxone for now    Rl Lyn M.D.  [facility], Division of Infectious Diseases  [phone]  After 5pm on weekdays and all day on weekends - please call [phone] 78 y/o F PMHx of dementia, HTN, HLD s/p cholecystectomy 1 week ago who presented w/ SOB and abd discomfort.     SOB, abd pain  US LE neg for DVT  s/p CTA- 1. No pulmonary embolus. Elevated left hemidiaphragm and partial left lower lobe atelectasis.   Superimposed infection cannot be excluded  s/p CT abd/pelvis- cholecystectomy. No loculated fluid collection along the gallbladder fossa. Mild central biliary distention   f/u blood cx- NGTD  MRCP pending  f/u TTE  procal <.5  low suspicion for bacterial PNA at this time  would discontinue azithromycin  ok to continue ceftriaxone/ flagyl while work-up pending    ?cellulitis  daughter reports LLE shin w/ bright red erythema which has improved since starting antibiotics  no erythema present at this time  c/w ceftriaxone for now    Rl Lyn M.D.  [facility], Division of Infectious Diseases  [phone]  After 5pm on weekdays and all day on weekends - please call [phone]

## 2022-12-05 NOTE — PROGRESS NOTE ADULT - SUBJECTIVE AND OBJECTIVE BOX
[facility] -- Jeri Ridley,  Ramón Rouse Patel, Savella, Goodger  Office # [phone]    Follow Up:  Volume Overload/Edema    Subjective/Observations: No events overnight resting comfortably in bed.  No complaints of chest pain, dyspnea, or palpitations reported. No signs of orthopnea or PND.      REVIEW OF SYSTEMS: All other review of systems is negative unless indicated above  PAST MEDICAL & SURGICAL HISTORY:  Diabetes mellitus      Hyperlipidemia      Hypertension      S/P cholecystectomy  11/2022        MEDICATIONS  (STANDING):  atorvastatin 20 milliGRAM(s) Oral at bedtime  azithromycin  IVPB 500 milliGRAM(s) IV Intermittent every 24 hours  cefTRIAXone   IVPB 1000 milliGRAM(s) IV Intermittent every 24 hours  dextrose 5%. 1000 milliLiter(s) (100 mL/Hr) IV Continuous <Continuous>  dextrose 5%. 1000 milliLiter(s) (50 mL/Hr) IV Continuous <Continuous>  dextrose 50% Injectable 25 Gram(s) IV Push once  dextrose 50% Injectable 12.5 Gram(s) IV Push once  dextrose 50% Injectable 25 Gram(s) IV Push once  DULoxetine 30 milliGRAM(s) Oral daily  enoxaparin Injectable 40 milliGRAM(s) SubCutaneous every 24 hours  furosemide   Injectable 40 milliGRAM(s) IV Push daily  glucagon  Injectable 1 milliGRAM(s) IntraMuscular once  influenza  Vaccine (HIGH DOSE) 0.7 milliLiter(s) IntraMuscular once  insulin lispro (ADMELOG) corrective regimen sliding scale   SubCutaneous three times a day before meals  insulin lispro (ADMELOG) corrective regimen sliding scale   SubCutaneous at bedtime  lisinopril 20 milliGRAM(s) Oral daily  metoprolol succinate ER 12.5 milliGRAM(s) Oral daily  metroNIDAZOLE  IVPB 500 milliGRAM(s) IV Intermittent every 8 hours    MEDICATIONS  (PRN):  acetaminophen     Tablet .. 650 milliGRAM(s) Oral every 6 hours PRN Temp greater or equal to 38C (100.4F), Mild Pain (1 - 3)  dextrose Oral Gel 15 Gram(s) Oral once PRN Blood Glucose LESS THAN 70 milliGRAM(s)/deciliter  ondansetron Injectable 4 milliGRAM(s) IV Push every 8 hours PRN Nausea and/or Vomiting  traMADol 50 milliGRAM(s) Oral once PRN Severe Pain (7 - 10)    Allergies    penicillin (Hives)    Intolerances      Vital Signs Last 24 Hrs  T(C): 36.7 (05 Dec 2022 12:42), Max: 37 (04 Dec 2022 21:24)  T(F): 98 (05 Dec 2022 12:42), Max: 98.6 (04 Dec 2022 21:24)  HR: 75 (05 Dec 2022 12:42) (70 - 85)  BP: 135/80 (05 Dec 2022 12:42) (135/80 - 190/84)  BP(mean): --  RR: 18 (05 Dec 2022 12:42) (16 - 18)  SpO2: 95% (05 Dec 2022 12:42) (95% - 100%)    Parameters below as of 05 Dec 2022 12:42  Patient On (Oxygen Delivery Method): room air      I&O's Summary      TELE: off  PHYSICAL EXAM:  Constitutional: NAD, alert and oriented x 3, obese  Lungs:  Decreased breath sounds b/l. No rales, crackles or wheeze appreciated.   Cardiovascular: RRR.  S1 and S2 positive.  +murmurs ll/Vl, norubs, gallops or clicks  Gastrointestinal: Bowel Sounds present, soft, +tenderness.  Healing incisions from milind  Lymph:  2+ BLE edema. No cervical lymphadenopathy.  Neurological: Alert, no focal deficits  Skin: No rashes or ulcers.  +erythema BLE  Psych:  Mood & affect appropriate.    LABS: All Labs Reviewed:                        12.3   8.49  )-----------( 276      ( 05 Dec 2022 05:37 )             39.5                         12.2   10.08 )-----------( 292      ( 04 Dec 2022 08:30 )             37.3     05 Dec 2022 05:37    140    |  109    |  14     ----------------------------<  135    4.0     |  23     |  1.10   04 Dec 2022 08:30    142    |  111    |  17     ----------------------------<  155    4.4     |  24     |  1.20     Ca    8.6        05 Dec 2022 05:37  Ca    8.7        04 Dec 2022 08:30  Phos  2.5       05 Dec 2022 05:37  Mg     2.1       05 Dec 2022 05:37  Mg     2.2       04 Dec 2022 08:30    TPro  6.8    /  Alb  2.5    /  TBili  0.4    /  DBili  x      /  AST  16     /  ALT  42     /  AlkPhos  132    05 Dec 2022 05:37  TPro  6.4    /  Alb  2.4    /  TBili  0.4    /  DBili  x      /  AST  17     /  ALT  47     /  AlkPhos  139    04 Dec 2022 08:30    PT/INR - ( 04 Dec 2022 08:30 )   PT: 11.6 sec;   INR: 0.99 ratio         PTT - ( 04 Dec 2022 08:30 )  PTT:27.7 sec      12 Lead ECG:   Ventricular Rate 68 BPM    Atrial Rate 68 BPM    P-R Interval 146 ms    QRS Duration 76 ms    Q-T Interval 410 ms    QTC Calculation(Bazett) 435 ms    P Axis 61 degrees    R Axis -5 degrees    T Axis 111 degrees    Diagnosis Line *** Poor data quality, interpretation may be adversely affected  Normal sinus rhythm  ST & T wave abnormality, consider lateral ischemia  Abnormal ECG  No previous ECGs available  Confirmed by MARLENY FONSECA ([id number]) on 12/4/2022 4:09:07 PM (12-04-22 @ 09:20)        [facility] -- Jeri Ridley,  Ramón Rouse Patel, Savella, Goodger  Office # [phone]    Follow Up:  Volume Overload/Edema    Subjective/Observations: No events overnight resting comfortably in bed.  No complaints of chest pain, dyspnea, or palpitations reported. No signs of orthopnea or PND.      REVIEW OF SYSTEMS: All other review of systems is negative unless indicated above  PAST MEDICAL & SURGICAL HISTORY:  Diabetes mellitus      Hyperlipidemia      Hypertension      S/P cholecystectomy  11/2022        MEDICATIONS  (STANDING):  atorvastatin 20 milliGRAM(s) Oral at bedtime  azithromycin  IVPB 500 milliGRAM(s) IV Intermittent every 24 hours  cefTRIAXone   IVPB 1000 milliGRAM(s) IV Intermittent every 24 hours  dextrose 5%. 1000 milliLiter(s) (100 mL/Hr) IV Continuous <Continuous>  dextrose 5%. 1000 milliLiter(s) (50 mL/Hr) IV Continuous <Continuous>  dextrose 50% Injectable 25 Gram(s) IV Push once  dextrose 50% Injectable 12.5 Gram(s) IV Push once  dextrose 50% Injectable 25 Gram(s) IV Push once  DULoxetine 30 milliGRAM(s) Oral daily  enoxaparin Injectable 40 milliGRAM(s) SubCutaneous every 24 hours  furosemide   Injectable 40 milliGRAM(s) IV Push daily  glucagon  Injectable 1 milliGRAM(s) IntraMuscular once  influenza  Vaccine (HIGH DOSE) 0.7 milliLiter(s) IntraMuscular once  insulin lispro (ADMELOG) corrective regimen sliding scale   SubCutaneous three times a day before meals  insulin lispro (ADMELOG) corrective regimen sliding scale   SubCutaneous at bedtime  lisinopril 20 milliGRAM(s) Oral daily  metoprolol succinate ER 12.5 milliGRAM(s) Oral daily  metroNIDAZOLE  IVPB 500 milliGRAM(s) IV Intermittent every 8 hours    MEDICATIONS  (PRN):  acetaminophen     Tablet .. 650 milliGRAM(s) Oral every 6 hours PRN Temp greater or equal to 38C (100.4F), Mild Pain (1 - 3)  dextrose Oral Gel 15 Gram(s) Oral once PRN Blood Glucose LESS THAN 70 milliGRAM(s)/deciliter  ondansetron Injectable 4 milliGRAM(s) IV Push every 8 hours PRN Nausea and/or Vomiting  traMADol 50 milliGRAM(s) Oral once PRN Severe Pain (7 - 10)    Allergies    penicillin (Hives)    Intolerances      Vital Signs Last 24 Hrs  T(C): 36.7 (05 Dec 2022 12:42), Max: 37 (04 Dec 2022 21:24)  T(F): 98 (05 Dec 2022 12:42), Max: 98.6 (04 Dec 2022 21:24)  HR: 75 (05 Dec 2022 12:42) (70 - 85)  BP: 135/80 (05 Dec 2022 12:42) (135/80 - 190/84)  BP(mean): --  RR: 18 (05 Dec 2022 12:42) (16 - 18)  SpO2: 95% (05 Dec 2022 12:42) (95% - 100%)    Parameters below as of 05 Dec 2022 12:42  Patient On (Oxygen Delivery Method): room air      I&O's Summary      TELE: off  PHYSICAL EXAM:  Constitutional: NAD, alert and oriented x 3, obese  Lungs:  Decreased breath sounds b/l. No rales, crackles or wheeze appreciated.   Cardiovascular: RRR.  S1 and S2 positive.  +murmurs ll/Vl, norubs, gallops or clicks  Gastrointestinal: Bowel Sounds present, soft, +tenderness.  Healing incisions from milind  Lymph:  2+ BLE edema. No cervical lymphadenopathy.  Neurological: Alert, no focal deficits  Skin: No rashes or ulcers.  +erythema BLE  Psych:  Mood & affect appropriate.    LABS: All Labs Reviewed:                        12.3   8.49  )-----------( 276      ( 05 Dec 2022 05:37 )             39.5                         12.2   10.08 )-----------( 292      ( 04 Dec 2022 08:30 )             37.3     05 Dec 2022 05:37    140    |  109    |  14     ----------------------------<  135    4.0     |  23     |  1.10   04 Dec 2022 08:30    142    |  111    |  17     ----------------------------<  155    4.4     |  24     |  1.20     Ca    8.6        05 Dec 2022 05:37  Ca    8.7        04 Dec 2022 08:30  Phos  2.5       05 Dec 2022 05:37  Mg     2.1       05 Dec 2022 05:37  Mg     2.2       04 Dec 2022 08:30    TPro  6.8    /  Alb  2.5    /  TBili  0.4    /  DBili  x      /  AST  16     /  ALT  42     /  AlkPhos  132    05 Dec 2022 05:37  TPro  6.4    /  Alb  2.4    /  TBili  0.4    /  DBili  x      /  AST  17     /  ALT  47     /  AlkPhos  139    04 Dec 2022 08:30    PT/INR - ( 04 Dec 2022 08:30 )   PT: 11.6 sec;   INR: 0.99 ratio         PTT - ( 04 Dec 2022 08:30 )  PTT:27.7 sec      12 Lead ECG:   Ventricular Rate 68 BPM    Atrial Rate 68 BPM    P-R Interval 146 ms    QRS Duration 76 ms    Q-T Interval 410 ms    QTC Calculation(Bazett) 435 ms    P Axis 61 degrees    R Axis -5 degrees    T Axis 111 degrees    Diagnosis Line *** Poor data quality, interpretation may be adversely affected  Normal sinus rhythm  ST & T wave abnormality, consider lateral ischemia  Abnormal ECG  No previous ECGs available  Confirmed by MARLENY FONSECA ([id number]) on 12/4/2022 4:09:07 PM (12-04-22 @ 09:20)        [facility] -- Jeri Ridley,  Ramón Rouse Patel, Savella, Goodger  Office # [phone]    Follow Up:  Volume Overload/Edema    Subjective/Observations: No events overnight resting comfortably in bed.  No complaints of chest pain, dyspnea, or palpitations reported. No signs of orthopnea or PND.      REVIEW OF SYSTEMS: All other review of systems is negative unless indicated above  PAST MEDICAL & SURGICAL HISTORY:  Diabetes mellitus      Hyperlipidemia      Hypertension      S/P cholecystectomy  11/2022        MEDICATIONS  (STANDING):  atorvastatin 20 milliGRAM(s) Oral at bedtime  azithromycin  IVPB 500 milliGRAM(s) IV Intermittent every 24 hours  cefTRIAXone   IVPB 1000 milliGRAM(s) IV Intermittent every 24 hours  dextrose 5%. 1000 milliLiter(s) (100 mL/Hr) IV Continuous <Continuous>  dextrose 5%. 1000 milliLiter(s) (50 mL/Hr) IV Continuous <Continuous>  dextrose 50% Injectable 25 Gram(s) IV Push once  dextrose 50% Injectable 12.5 Gram(s) IV Push once  dextrose 50% Injectable 25 Gram(s) IV Push once  DULoxetine 30 milliGRAM(s) Oral daily  enoxaparin Injectable 40 milliGRAM(s) SubCutaneous every 24 hours  furosemide   Injectable 40 milliGRAM(s) IV Push daily  glucagon  Injectable 1 milliGRAM(s) IntraMuscular once  influenza  Vaccine (HIGH DOSE) 0.7 milliLiter(s) IntraMuscular once  insulin lispro (ADMELOG) corrective regimen sliding scale   SubCutaneous three times a day before meals  insulin lispro (ADMELOG) corrective regimen sliding scale   SubCutaneous at bedtime  lisinopril 20 milliGRAM(s) Oral daily  metoprolol succinate ER 12.5 milliGRAM(s) Oral daily  metroNIDAZOLE  IVPB 500 milliGRAM(s) IV Intermittent every 8 hours    MEDICATIONS  (PRN):  acetaminophen     Tablet .. 650 milliGRAM(s) Oral every 6 hours PRN Temp greater or equal to 38C (100.4F), Mild Pain (1 - 3)  dextrose Oral Gel 15 Gram(s) Oral once PRN Blood Glucose LESS THAN 70 milliGRAM(s)/deciliter  ondansetron Injectable 4 milliGRAM(s) IV Push every 8 hours PRN Nausea and/or Vomiting  traMADol 50 milliGRAM(s) Oral once PRN Severe Pain (7 - 10)    Allergies    penicillin (Hives)    Intolerances      Vital Signs Last 24 Hrs  T(C): 36.7 (05 Dec 2022 12:42), Max: 37 (04 Dec 2022 21:24)  T(F): 98 (05 Dec 2022 12:42), Max: 98.6 (04 Dec 2022 21:24)  HR: 75 (05 Dec 2022 12:42) (70 - 85)  BP: 135/80 (05 Dec 2022 12:42) (135/80 - 190/84)  BP(mean): --  RR: 18 (05 Dec 2022 12:42) (16 - 18)  SpO2: 95% (05 Dec 2022 12:42) (95% - 100%)    Parameters below as of 05 Dec 2022 12:42  Patient On (Oxygen Delivery Method): room air      I&O's Summary      TELE: off  PHYSICAL EXAM:  Constitutional: NAD, alert and oriented x 3, obese  Lungs:  Decreased breath sounds b/l. No rales, crackles or wheeze appreciated.   Cardiovascular: RRR.  S1 and S2 positive.  +murmurs ll/Vl, norubs, gallops or clicks  Gastrointestinal: Bowel Sounds present, soft, +tenderness.  Healing incisions from milind  Lymph:  2+ BLE edema. No cervical lymphadenopathy.  Neurological: Alert, no focal deficits  Skin: No rashes or ulcers.  +erythema BLE  Psych:  Mood & affect appropriate.    LABS: All Labs Reviewed:                        12.3   8.49  )-----------( 276      ( 05 Dec 2022 05:37 )             39.5                         12.2   10.08 )-----------( 292      ( 04 Dec 2022 08:30 )             37.3     05 Dec 2022 05:37    140    |  109    |  14     ----------------------------<  135    4.0     |  23     |  1.10   04 Dec 2022 08:30    142    |  111    |  17     ----------------------------<  155    4.4     |  24     |  1.20     Ca    8.6        05 Dec 2022 05:37  Ca    8.7        04 Dec 2022 08:30  Phos  2.5       05 Dec 2022 05:37  Mg     2.1       05 Dec 2022 05:37  Mg     2.2       04 Dec 2022 08:30    TPro  6.8    /  Alb  2.5    /  TBili  0.4    /  DBili  x      /  AST  16     /  ALT  42     /  AlkPhos  132    05 Dec 2022 05:37  TPro  6.4    /  Alb  2.4    /  TBili  0.4    /  DBili  x      /  AST  17     /  ALT  47     /  AlkPhos  139    04 Dec 2022 08:30    PT/INR - ( 04 Dec 2022 08:30 )   PT: 11.6 sec;   INR: 0.99 ratio         PTT - ( 04 Dec 2022 08:30 )  PTT:27.7 sec      12 Lead ECG:   Ventricular Rate 68 BPM    Atrial Rate 68 BPM    P-R Interval 146 ms    QRS Duration 76 ms    Q-T Interval 410 ms    QTC Calculation(Bazett) 435 ms    P Axis 61 degrees    R Axis -5 degrees    T Axis 111 degrees    Diagnosis Line *** Poor data quality, interpretation may be adversely affected  Normal sinus rhythm  ST & T wave abnormality, consider lateral ischemia  Abnormal ECG  No previous ECGs available  Confirmed by MARLENY FONSECA ([id number]) on 12/4/2022 4:09:07 PM (12-04-22 @ 09:20)

## 2022-12-06 LAB
ALBUMIN SERPL ELPH-MCNC: 2.2 G/DL — LOW (ref 3.3–5)
ALP SERPL-CCNC: 103 U/L — SIGNIFICANT CHANGE UP (ref 40–120)
ALT FLD-CCNC: 26 U/L — SIGNIFICANT CHANGE UP (ref 12–78)
ANION GAP SERPL CALC-SCNC: 7 MMOL/L — SIGNIFICANT CHANGE UP (ref 5–17)
AST SERPL-CCNC: 14 U/L — LOW (ref 15–37)
BASOPHILS # BLD AUTO: 0.04 K/UL — SIGNIFICANT CHANGE UP (ref 0–0.2)
BASOPHILS NFR BLD AUTO: 0.6 % — SIGNIFICANT CHANGE UP (ref 0–2)
BILIRUB SERPL-MCNC: 0.3 MG/DL — SIGNIFICANT CHANGE UP (ref 0.2–1.2)
BUN SERPL-MCNC: 15 MG/DL — SIGNIFICANT CHANGE UP (ref 7–23)
CALCIUM SERPL-MCNC: 8.7 MG/DL — SIGNIFICANT CHANGE UP (ref 8.5–10.1)
CHLORIDE SERPL-SCNC: 106 MMOL/L — SIGNIFICANT CHANGE UP (ref 96–108)
CO2 SERPL-SCNC: 26 MMOL/L — SIGNIFICANT CHANGE UP (ref 22–31)
CREAT SERPL-MCNC: 1.1 MG/DL — SIGNIFICANT CHANGE UP (ref 0.5–1.3)
CULTURE RESULTS: SIGNIFICANT CHANGE UP
EGFR: 51 ML/MIN/1.73M2 — LOW
EOSINOPHIL # BLD AUTO: 0.27 K/UL — SIGNIFICANT CHANGE UP (ref 0–0.5)
EOSINOPHIL NFR BLD AUTO: 4.1 % — SIGNIFICANT CHANGE UP (ref 0–6)
GLUCOSE SERPL-MCNC: 140 MG/DL — HIGH (ref 70–99)
HCT VFR BLD CALC: 35.4 % — SIGNIFICANT CHANGE UP (ref 34.5–45)
HGB BLD-MCNC: 11.6 G/DL — SIGNIFICANT CHANGE UP (ref 11.5–15.5)
IMM GRANULOCYTES NFR BLD AUTO: 3.8 % — HIGH (ref 0–0.9)
LYMPHOCYTES # BLD AUTO: 1.03 K/UL — SIGNIFICANT CHANGE UP (ref 1–3.3)
LYMPHOCYTES # BLD AUTO: 15.7 % — SIGNIFICANT CHANGE UP (ref 13–44)
MAGNESIUM SERPL-MCNC: 2 MG/DL — SIGNIFICANT CHANGE UP (ref 1.6–2.6)
MCHC RBC-ENTMCNC: 30.3 PG — SIGNIFICANT CHANGE UP (ref 27–34)
MCHC RBC-ENTMCNC: 32.8 GM/DL — SIGNIFICANT CHANGE UP (ref 32–36)
MCV RBC AUTO: 92.4 FL — SIGNIFICANT CHANGE UP (ref 80–100)
MONOCYTES # BLD AUTO: 0.81 K/UL — SIGNIFICANT CHANGE UP (ref 0–0.9)
MONOCYTES NFR BLD AUTO: 12.4 % — SIGNIFICANT CHANGE UP (ref 2–14)
NEUTROPHILS # BLD AUTO: 4.14 K/UL — SIGNIFICANT CHANGE UP (ref 1.8–7.4)
NEUTROPHILS NFR BLD AUTO: 63.4 % — SIGNIFICANT CHANGE UP (ref 43–77)
NRBC # BLD: 0 /100 WBCS — SIGNIFICANT CHANGE UP (ref 0–0)
PLATELET # BLD AUTO: 285 K/UL — SIGNIFICANT CHANGE UP (ref 150–400)
POTASSIUM SERPL-MCNC: 3.8 MMOL/L — SIGNIFICANT CHANGE UP (ref 3.5–5.3)
POTASSIUM SERPL-SCNC: 3.8 MMOL/L — SIGNIFICANT CHANGE UP (ref 3.5–5.3)
PROT SERPL-MCNC: 6 G/DL — SIGNIFICANT CHANGE UP (ref 6–8.3)
RBC # BLD: 3.83 M/UL — SIGNIFICANT CHANGE UP (ref 3.8–5.2)
RBC # FLD: 14.1 % — SIGNIFICANT CHANGE UP (ref 10.3–14.5)
SODIUM SERPL-SCNC: 139 MMOL/L — SIGNIFICANT CHANGE UP (ref 135–145)
SPECIMEN SOURCE: SIGNIFICANT CHANGE UP
WBC # BLD: 6.54 K/UL — SIGNIFICANT CHANGE UP (ref 3.8–10.5)
WBC # FLD AUTO: 6.54 K/UL — SIGNIFICANT CHANGE UP (ref 3.8–10.5)

## 2022-12-06 PROCEDURE — 74183 MRI ABD W/O CNTR FLWD CNTR: CPT | Mod: 26

## 2022-12-06 PROCEDURE — 99232 SBSQ HOSP IP/OBS MODERATE 35: CPT

## 2022-12-06 PROCEDURE — 99239 HOSP IP/OBS DSCHRG MGMT >30: CPT

## 2022-12-06 RX ORDER — CYCLOBENZAPRINE HYDROCHLORIDE 10 MG/1
5 TABLET, FILM COATED ORAL ONCE
Refills: 0 | Status: COMPLETED | OUTPATIENT
Start: 2022-12-06 | End: 2022-12-06

## 2022-12-06 RX ADMIN — DULOXETINE HYDROCHLORIDE 30 MILLIGRAM(S): 30 CAPSULE, DELAYED RELEASE ORAL at 11:00

## 2022-12-06 RX ADMIN — CYCLOBENZAPRINE HYDROCHLORIDE 5 MILLIGRAM(S): 10 TABLET, FILM COATED ORAL at 11:24

## 2022-12-06 RX ADMIN — LISINOPRIL 20 MILLIGRAM(S): 2.5 TABLET ORAL at 05:53

## 2022-12-06 RX ADMIN — Medication 40 MILLIGRAM(S): at 05:52

## 2022-12-06 RX ADMIN — Medication 12.5 MILLIGRAM(S): at 05:52

## 2022-12-06 RX ADMIN — ENOXAPARIN SODIUM 40 MILLIGRAM(S): 100 INJECTION SUBCUTANEOUS at 11:00

## 2022-12-06 RX ADMIN — Medication 100 MILLIGRAM(S): at 15:56

## 2022-12-06 RX ADMIN — Medication 100 MILLIGRAM(S): at 21:17

## 2022-12-06 RX ADMIN — ATORVASTATIN CALCIUM 20 MILLIGRAM(S): 80 TABLET, FILM COATED ORAL at 21:17

## 2022-12-06 RX ADMIN — CEFTRIAXONE 100 MILLIGRAM(S): 500 INJECTION, POWDER, FOR SOLUTION INTRAMUSCULAR; INTRAVENOUS at 10:59

## 2022-12-06 RX ADMIN — Medication 100 MILLIGRAM(S): at 05:52

## 2022-12-06 NOTE — PROGRESS NOTE ADULT - ASSESSMENT
78 y/o F PMHx of dementia, HTN, HLD s/p cholecystectomy 1 week ago who presented w/ SOB and abd discomfort.     SOB, abd pain  US LE neg for DVT  s/p CTA- 1. No pulmonary embolus. Elevated left hemidiaphragm and partial left lower lobe atelectasis.   Superimposed infection cannot be excluded  s/p CT abd/pelvis- cholecystectomy. No loculated fluid collection along the gallbladder fossa. Mild central biliary distention   reports abd pain improved today  f/u blood cx- NGTD  MRCP pending  f/u TTE  c/w ceftriaxone/ flagyl pending blood cx, MRCP    ?cellulitis- resolved  daughter reports LLE shin w/ bright red erythema which has improved since starting antibiotics  no erythema present at this time  c/w ceftriaxone as above    edema  diuresis per cards    Rl Lyn M.D.  [facility], Division of Infectious Diseases  [phone]  After 5pm on weekdays and all day on weekends - please call [phone] 76 y/o F PMHx of dementia, HTN, HLD s/p cholecystectomy 1 week ago who presented w/ SOB and abd discomfort.     SOB, abd pain  US LE neg for DVT  s/p CTA- 1. No pulmonary embolus. Elevated left hemidiaphragm and partial left lower lobe atelectasis.   Superimposed infection cannot be excluded  s/p CT abd/pelvis- cholecystectomy. No loculated fluid collection along the gallbladder fossa. Mild central biliary distention   reports abd pain improved today  f/u blood cx- NGTD  MRCP pending  f/u TTE  c/w ceftriaxone/ flagyl pending blood cx, MRCP    ?cellulitis- resolved  daughter reports LLE shin w/ bright red erythema which has improved since starting antibiotics  no erythema present at this time  c/w ceftriaxone as above    edema  diuresis per cards    Rl Lyn M.D.  [facility], Division of Infectious Diseases  [phone]  After 5pm on weekdays and all day on weekends - please call [phone] 78 y/o F PMHx of dementia, HTN, HLD s/p cholecystectomy 1 week ago who presented w/ SOB and abd discomfort.     SOB, abd pain  US LE neg for DVT  s/p CTA- 1. No pulmonary embolus. Elevated left hemidiaphragm and partial left lower lobe atelectasis.   Superimposed infection cannot be excluded  s/p CT abd/pelvis- cholecystectomy. No loculated fluid collection along the gallbladder fossa. Mild central biliary distention   reports abd pain improved today  f/u blood cx- NGTD  MRCP pending  f/u TTE  c/w ceftriaxone/ flagyl pending blood cx, MRCP    ?cellulitis- resolved  daughter reports LLE shin w/ bright red erythema which has improved since starting antibiotics  no erythema present at this time  c/w ceftriaxone as above    edema  diuresis per cards    Rl Lyn M.D.  [facility], Division of Infectious Diseases  [phone]  After 5pm on weekdays and all day on weekends - please call [phone]

## 2022-12-06 NOTE — PROGRESS NOTE ADULT - SUBJECTIVE AND OBJECTIVE BOX
Patient is a 77y old  Female who presents with a chief complaint of Fever     HPI:  76 y/o F PMHx of dementia, diabetes?, HTN, HLD s/p cholecystectomy 1 week ago w/ otherwise unknown medical history presented to the ED by ambulance. History obtained through chart review and patient. Patient is a poor historian and daughter called twice but unable to be reached. Patient allegedly brought in from home for fever (not documented in ED), abd discomfort, and cough since last night 12/3. During interview patient only endorsing lower abdominal pain, patient visibly comfortable and in NAD though rates pain 7/10. Allegedly patient off Lasix for 2 weeks due to prescription issue, no antipyretic used prior to admission. Patient does not endorse any other symptoms.    INTERVAL HPI/OVERNIGHT EVENTS: Patient was seen and examined. No acute events occurred overnight. No new complaints. Denies chest pain, palpitations, dyspnea, headache, dizziness, abdominal pain, n/v/d. Being treated for cellulitis, pneumonia and CBD stone post cholecystectomy.    LABS:        139  |  106  |  15  ----------------------------<  140<H>  3.8   |  26  |  1.10    Ca    8.7      06 Dec 2022 06:41  Phos  2.5       Mg     2.0         TPro  6.0  /  Alb  2.2<L>  /  TBili  0.3  /  DBili  x   /  AST  14<L>  /  ALT  26  /  AlkPhos  103                            11.6   6.54  )-----------( 285      ( 06 Dec 2022 06:41 )             35.4       CAPILLARY BLOOD GLUCOSE      POCT Blood Glucose.: 135 mg/dL (06 Dec 2022 05:56)  POCT Blood Glucose.: 137 mg/dL (05 Dec 2022 23:48)  POCT Blood Glucose.: 130 mg/dL (05 Dec 2022 18:36)      Urinalysis Basic - ( 05 Dec 2022 08:11 )    Color: Yellow / Appearance: Clear / S.010 / pH: x  Gluc: x / Ketone: Negative  / Bili: Negative / Urobili: Negative   Blood: x / Protein: Negative / Nitrite: Negative   Leuk Esterase: Negative / RBC: x / WBC x   Sq Epi: x / Non Sq Epi: x / Bacteria: x    MEDICATIONS  (STANDING):  atorvastatin 20 milliGRAM(s) Oral at bedtime  cefTRIAXone   IVPB 1000 milliGRAM(s) IV Intermittent every 24 hours  dextrose 5%. 1000 milliLiter(s) (100 mL/Hr) IV Continuous <Continuous>  dextrose 5%. 1000 milliLiter(s) (50 mL/Hr) IV Continuous <Continuous>  dextrose 50% Injectable 25 Gram(s) IV Push once  dextrose 50% Injectable 12.5 Gram(s) IV Push once  dextrose 50% Injectable 25 Gram(s) IV Push once  DULoxetine 30 milliGRAM(s) Oral daily  enoxaparin Injectable 40 milliGRAM(s) SubCutaneous every 24 hours  furosemide   Injectable 40 milliGRAM(s) IV Push daily  glucagon  Injectable 1 milliGRAM(s) IntraMuscular once  influenza  Vaccine (HIGH DOSE) 0.7 milliLiter(s) IntraMuscular once  insulin lispro (ADMELOG) corrective regimen sliding scale   SubCutaneous every 6 hours  lisinopril 20 milliGRAM(s) Oral daily  metoprolol succinate ER 12.5 milliGRAM(s) Oral daily  metroNIDAZOLE  IVPB 500 milliGRAM(s) IV Intermittent every 8 hours    MEDICATIONS  (PRN):  acetaminophen     Tablet .. 650 milliGRAM(s) Oral every 6 hours PRN Temp greater or equal to 38C (100.4F), Mild Pain (1 - 3)  dextrose Oral Gel 15 Gram(s) Oral once PRN Blood Glucose LESS THAN 70 milliGRAM(s)/deciliter  ondansetron Injectable 4 milliGRAM(s) IV Push every 8 hours PRN Nausea and/or Vomiting  traMADol 50 milliGRAM(s) Oral once PRN Severe Pain (7 - 10)        REVIEW OF SYSTEMS:  CONSTITUTIONAL: No fever or chills  HEENT:  No headache, no sore throat  RESPIRATORY: No wheezing, or shortness of breath; admits cough  CARDIOVASCULAR: No chest pain, palpitations  GASTROINTESTINAL: No abdominal pain, nausea, vomiting, or diarrhea  GENITOURINARY: No dysuria, frequency, or hematuria  NEUROLOGICAL: no focal weakness or dizziness  MUSCULOSKELETAL: no myalgias; admits LLE pain, improved now  PSYCH: no recent changes in mood    RADIOLOGY & ADDITIONAL TESTS:    Imaging Personally Reviewed:  [x] YES  [ ] NO    Consultant(s) Notes Reviewed:  [x] YES  [ ] NO    PHYSICAL EXAM:    Vital Signs Last 24 Hrs  T(C): 36.7 (06 Dec 2022 11:33), Max: 36.7 (06 Dec 2022 11:33)  T(F): 98 (06 Dec 2022 11:33), Max: 98 (06 Dec 2022 11:33)  HR: 68 (06 Dec 2022 11:33) (64 - 73)  BP: 147/70 (06 Dec 2022 11:33) (145/75 - 159/75)  BP(mean): --  RR: 18 (06 Dec 2022 11:33) (17 - 18)  SpO2: 93% (06 Dec 2022 11:33) (93% - 95%)    Parameters below as of 06 Dec 2022 11:33  Patient On (Oxygen Delivery Method): room air      GENERAL: NAD, well-developed, well-groomed  HEENT:  anicteric, moist mucous membranes  CHEST/LUNG:  decreased breath sounds  HEART:  RRR, S1, S2  ABDOMEN:  BS+, soft, nontender, nondistended  EXTREMITIES: no cyanosis; + peripheral edema  NERVOUS SYSTEM: answers questions and follows commands appropriately  INTEGUMENT: LLE erythema resolved   PSYCH: normal affect    Care Discussed with Consultants/Other Providers [x] YES  [ ] NO

## 2022-12-06 NOTE — PROGRESS NOTE ADULT - SUBJECTIVE AND OBJECTIVE BOX
[facility], Division of Infectious Diseases  JERRY Bee Y. Patel, S. Shah, G. Riki  [phone]  ([phone] - weekdays after 5pm and weekends)    Name: OMA SPRING  Age/Gender: 77y Female  MRN: [id number]    Interval History:  Patient seen this morning.   Notes reviewed.   No concerning overnight events.  Afebrile.   denies abd pain today  daughter at bedside, reports pt doing better today    Allergies: penicillin (Hives)      Objective:  Vitals:   T(F): 98 (22 @ 11:33), Max: 98 (22 @ 12:42)  HR: 68 (22 @ 11:33) (64 - 75)  BP: 147/70 (22 @ 11:33) (135/80 - 159/75)  RR: 18 (22 @ 11:33) (17 - 18)  SpO2: 93% (22 @ 11:33) (93% - 95%)  Physical Examination:  General: no acute distress  HEENT: anicteric  Cardio: S1, S2, normal rate  Resp: clear to auscultation bilaterally  Abd: soft, NT, ND  Ext: b/l LE edema, venous stasis dermatitis  Skin: warm, dry    Laboratory Studies:  CBC:                       11.6   6.54  )-----------( 285      ( 06 Dec 2022 06:41 )             35.4     WBC Trend:  6.54 22 @ 06:41  8.49 22 @ 05:37  10.08 22 @ 08:30    CMP:     139  |  106  |  15  ----------------------------<  140<H>  3.8   |  26  |  1.10    Ca    8.7      06 Dec 2022 06:41  Phos  2.5       Mg     2.0         TPro  6.0  /  Alb  2.2<L>  /  TBili  0.3  /  DBili  x   /  AST  14<L>  /  ALT  26  /  AlkPhos  103        LIVER FUNCTIONS - ( 06 Dec 2022 06:41 )  Alb: 2.2 g/dL / Pro: 6.0 g/dL / ALK PHOS: 103 U/L / ALT: 26 U/L / AST: 14 U/L / GGT: x             Urinalysis Basic - ( 05 Dec 2022 08:11 )    Color: Yellow / Appearance: Clear / S.010 / pH: x  Gluc: x / Ketone: Negative  / Bili: Negative / Urobili: Negative   Blood: x / Protein: Negative / Nitrite: Negative   Leuk Esterase: Negative / RBC: x / WBC x   Sq Epi: x / Non Sq Epi: x / Bacteria: x      Microbiology: reviewed     Culture - Urine (collected 22 @ 08:11)  Source: Clean Catch Clean Catch (Midstream)  Final Report (22 @ 09:53):    <10,000 CFU/mL Normal Urogenital Deepthi    Culture - Blood (collected 22 @ 08:40)  Source: .Blood Blood-Peripheral  Preliminary Report (22 @ 13:02):    No growth to date.    Culture - Blood (collected 22 @ 08:30)  Source: .Blood Blood-Peripheral  Preliminary Report (22 @ 13:02):    No growth to date.        Radiology: reviewed     Medications:  acetaminophen     Tablet .. 650 milliGRAM(s) Oral every 6 hours PRN  atorvastatin 20 milliGRAM(s) Oral at bedtime  cefTRIAXone   IVPB 1000 milliGRAM(s) IV Intermittent every 24 hours  dextrose 5%. 1000 milliLiter(s) IV Continuous <Continuous>  dextrose 5%. 1000 milliLiter(s) IV Continuous <Continuous>  dextrose 50% Injectable 25 Gram(s) IV Push once  dextrose 50% Injectable 12.5 Gram(s) IV Push once  dextrose 50% Injectable 25 Gram(s) IV Push once  dextrose Oral Gel 15 Gram(s) Oral once PRN  DULoxetine 30 milliGRAM(s) Oral daily  enoxaparin Injectable 40 milliGRAM(s) SubCutaneous every 24 hours  furosemide   Injectable 40 milliGRAM(s) IV Push daily  glucagon  Injectable 1 milliGRAM(s) IntraMuscular once  influenza  Vaccine (HIGH DOSE) 0.7 milliLiter(s) IntraMuscular once  insulin lispro (ADMELOG) corrective regimen sliding scale   SubCutaneous every 6 hours  lisinopril 20 milliGRAM(s) Oral daily  metoprolol succinate ER 12.5 milliGRAM(s) Oral daily  metroNIDAZOLE  IVPB 500 milliGRAM(s) IV Intermittent every 8 hours  ondansetron Injectable 4 milliGRAM(s) IV Push every 8 hours PRN  traMADol 50 milliGRAM(s) Oral once PRN    Antimicrobials:  cefTRIAXone   IVPB 1000 milliGRAM(s) IV Intermittent every 24 hours  metroNIDAZOLE  IVPB 500 milliGRAM(s) IV Intermittent every 8 hours   [facility], Division of Infectious Diseases  JERRY Bee Y. Patel, S. Shah, G. Riki  [phone]  ([phone] - weekdays after 5pm and weekends)    Name: OMA SPRING  Age/Gender: 77y Female  MRN: [id number]    Interval History:  Patient seen this morning.   Notes reviewed.   No concerning overnight events.  Afebrile.   denies abd pain today  daughter at bedside, reports pt doing better today    Allergies: penicillin (Hives)      Objective:  Vitals:   T(F): 98 (22 @ 11:33), Max: 98 (22 @ 12:42)  HR: 68 (22 @ 11:33) (64 - 75)  BP: 147/70 (22 @ 11:33) (135/80 - 159/75)  RR: 18 (22 @ 11:33) (17 - 18)  SpO2: 93% (22 @ 11:33) (93% - 95%)  Physical Examination:  General: no acute distress  HEENT: anicteric  Cardio: S1, S2, normal rate  Resp: clear to auscultation bilaterally  Abd: soft, NT, ND  Ext: b/l LE edema, venous stasis dermatitis  Skin: warm, dry    Laboratory Studies:  CBC:                       11.6   6.54  )-----------( 285      ( 06 Dec 2022 06:41 )             35.4     WBC Trend:  6.54 22 @ 06:41  8.49 22 @ 05:37  10.08 22 @ 08:30    CMP:     139  |  106  |  15  ----------------------------<  140<H>  3.8   |  26  |  1.10    Ca    8.7      06 Dec 2022 06:41  Phos  2.5       Mg     2.0         TPro  6.0  /  Alb  2.2<L>  /  TBili  0.3  /  DBili  x   /  AST  14<L>  /  ALT  26  /  AlkPhos  103        LIVER FUNCTIONS - ( 06 Dec 2022 06:41 )  Alb: 2.2 g/dL / Pro: 6.0 g/dL / ALK PHOS: 103 U/L / ALT: 26 U/L / AST: 14 U/L / GGT: x             Urinalysis Basic - ( 05 Dec 2022 08:11 )    Color: Yellow / Appearance: Clear / S.010 / pH: x  Gluc: x / Ketone: Negative  / Bili: Negative / Urobili: Negative   Blood: x / Protein: Negative / Nitrite: Negative   Leuk Esterase: Negative / RBC: x / WBC x   Sq Epi: x / Non Sq Epi: x / Bacteria: x      Microbiology: reviewed     Culture - Urine (collected 22 @ 08:11)  Source: Clean Catch Clean Catch (Midstream)  Final Report (22 @ 09:53):    <10,000 CFU/mL Normal Urogenital Deepthi    Culture - Blood (collected 22 @ 08:40)  Source: .Blood Blood-Peripheral  Preliminary Report (22 @ 13:02):    No growth to date.    Culture - Blood (collected 22 @ 08:30)  Source: .Blood Blood-Peripheral  Preliminary Report (22 @ 13:02):    No growth to date.        Radiology: reviewed     Medications:  acetaminophen     Tablet .. 650 milliGRAM(s) Oral every 6 hours PRN  atorvastatin 20 milliGRAM(s) Oral at bedtime  cefTRIAXone   IVPB 1000 milliGRAM(s) IV Intermittent every 24 hours  dextrose 5%. 1000 milliLiter(s) IV Continuous <Continuous>  dextrose 5%. 1000 milliLiter(s) IV Continuous <Continuous>  dextrose 50% Injectable 25 Gram(s) IV Push once  dextrose 50% Injectable 12.5 Gram(s) IV Push once  dextrose 50% Injectable 25 Gram(s) IV Push once  dextrose Oral Gel 15 Gram(s) Oral once PRN  DULoxetine 30 milliGRAM(s) Oral daily  enoxaparin Injectable 40 milliGRAM(s) SubCutaneous every 24 hours  furosemide   Injectable 40 milliGRAM(s) IV Push daily  glucagon  Injectable 1 milliGRAM(s) IntraMuscular once  influenza  Vaccine (HIGH DOSE) 0.7 milliLiter(s) IntraMuscular once  insulin lispro (ADMELOG) corrective regimen sliding scale   SubCutaneous every 6 hours  lisinopril 20 milliGRAM(s) Oral daily  metoprolol succinate ER 12.5 milliGRAM(s) Oral daily  metroNIDAZOLE  IVPB 500 milliGRAM(s) IV Intermittent every 8 hours  ondansetron Injectable 4 milliGRAM(s) IV Push every 8 hours PRN  traMADol 50 milliGRAM(s) Oral once PRN    Antimicrobials:  cefTRIAXone   IVPB 1000 milliGRAM(s) IV Intermittent every 24 hours  metroNIDAZOLE  IVPB 500 milliGRAM(s) IV Intermittent every 8 hours   [facility], Division of Infectious Diseases  JERRY Bee Y. Patel, S. Shah, G. Riki  [phone]  ([phone] - weekdays after 5pm and weekends)    Name: OMA SPRING  Age/Gender: 77y Female  MRN: [id number]    Interval History:  Patient seen this morning.   Notes reviewed.   No concerning overnight events.  Afebrile.   denies abd pain today  daughter at bedside, reports pt doing better today    Allergies: penicillin (Hives)      Objective:  Vitals:   T(F): 98 (22 @ 11:33), Max: 98 (22 @ 12:42)  HR: 68 (22 @ 11:33) (64 - 75)  BP: 147/70 (22 @ 11:33) (135/80 - 159/75)  RR: 18 (22 @ 11:33) (17 - 18)  SpO2: 93% (22 @ 11:33) (93% - 95%)  Physical Examination:  General: no acute distress  HEENT: anicteric  Cardio: S1, S2, normal rate  Resp: clear to auscultation bilaterally  Abd: soft, NT, ND  Ext: b/l LE edema, venous stasis dermatitis  Skin: warm, dry    Laboratory Studies:  CBC:                       11.6   6.54  )-----------( 285      ( 06 Dec 2022 06:41 )             35.4     WBC Trend:  6.54 22 @ 06:41  8.49 22 @ 05:37  10.08 22 @ 08:30    CMP:     139  |  106  |  15  ----------------------------<  140<H>  3.8   |  26  |  1.10    Ca    8.7      06 Dec 2022 06:41  Phos  2.5       Mg     2.0         TPro  6.0  /  Alb  2.2<L>  /  TBili  0.3  /  DBili  x   /  AST  14<L>  /  ALT  26  /  AlkPhos  103        LIVER FUNCTIONS - ( 06 Dec 2022 06:41 )  Alb: 2.2 g/dL / Pro: 6.0 g/dL / ALK PHOS: 103 U/L / ALT: 26 U/L / AST: 14 U/L / GGT: x             Urinalysis Basic - ( 05 Dec 2022 08:11 )    Color: Yellow / Appearance: Clear / S.010 / pH: x  Gluc: x / Ketone: Negative  / Bili: Negative / Urobili: Negative   Blood: x / Protein: Negative / Nitrite: Negative   Leuk Esterase: Negative / RBC: x / WBC x   Sq Epi: x / Non Sq Epi: x / Bacteria: x      Microbiology: reviewed     Culture - Urine (collected 22 @ 08:11)  Source: Clean Catch Clean Catch (Midstream)  Final Report (22 @ 09:53):    <10,000 CFU/mL Normal Urogenital Deepthi    Culture - Blood (collected 22 @ 08:40)  Source: .Blood Blood-Peripheral  Preliminary Report (22 @ 13:02):    No growth to date.    Culture - Blood (collected 22 @ 08:30)  Source: .Blood Blood-Peripheral  Preliminary Report (22 @ 13:02):    No growth to date.        Radiology: reviewed     Medications:  acetaminophen     Tablet .. 650 milliGRAM(s) Oral every 6 hours PRN  atorvastatin 20 milliGRAM(s) Oral at bedtime  cefTRIAXone   IVPB 1000 milliGRAM(s) IV Intermittent every 24 hours  dextrose 5%. 1000 milliLiter(s) IV Continuous <Continuous>  dextrose 5%. 1000 milliLiter(s) IV Continuous <Continuous>  dextrose 50% Injectable 25 Gram(s) IV Push once  dextrose 50% Injectable 12.5 Gram(s) IV Push once  dextrose 50% Injectable 25 Gram(s) IV Push once  dextrose Oral Gel 15 Gram(s) Oral once PRN  DULoxetine 30 milliGRAM(s) Oral daily  enoxaparin Injectable 40 milliGRAM(s) SubCutaneous every 24 hours  furosemide   Injectable 40 milliGRAM(s) IV Push daily  glucagon  Injectable 1 milliGRAM(s) IntraMuscular once  influenza  Vaccine (HIGH DOSE) 0.7 milliLiter(s) IntraMuscular once  insulin lispro (ADMELOG) corrective regimen sliding scale   SubCutaneous every 6 hours  lisinopril 20 milliGRAM(s) Oral daily  metoprolol succinate ER 12.5 milliGRAM(s) Oral daily  metroNIDAZOLE  IVPB 500 milliGRAM(s) IV Intermittent every 8 hours  ondansetron Injectable 4 milliGRAM(s) IV Push every 8 hours PRN  traMADol 50 milliGRAM(s) Oral once PRN    Antimicrobials:  cefTRIAXone   IVPB 1000 milliGRAM(s) IV Intermittent every 24 hours  metroNIDAZOLE  IVPB 500 milliGRAM(s) IV Intermittent every 8 hours

## 2022-12-06 NOTE — PROGRESS NOTE ADULT - SUBJECTIVE AND OBJECTIVE BOX
[facility]  John Marcelino PA-C  [address] [place] [address]  [phone]      INTERVAL HPI/OVERNIGHT EVENTS:  Pt s/e with daughter at bedside  Reports no abdominal pain today  Planned for MRCP    MEDICATIONS  (STANDING):  atorvastatin 20 milliGRAM(s) Oral at bedtime  cefTRIAXone   IVPB 1000 milliGRAM(s) IV Intermittent every 24 hours  cyclobenzaprine 5 milliGRAM(s) Oral once  dextrose 5%. 1000 milliLiter(s) (100 mL/Hr) IV Continuous <Continuous>  dextrose 5%. 1000 milliLiter(s) (50 mL/Hr) IV Continuous <Continuous>  dextrose 50% Injectable 25 Gram(s) IV Push once  dextrose 50% Injectable 12.5 Gram(s) IV Push once  dextrose 50% Injectable 25 Gram(s) IV Push once  DULoxetine 30 milliGRAM(s) Oral daily  enoxaparin Injectable 40 milliGRAM(s) SubCutaneous every 24 hours  furosemide   Injectable 40 milliGRAM(s) IV Push daily  glucagon  Injectable 1 milliGRAM(s) IntraMuscular once  influenza  Vaccine (HIGH DOSE) 0.7 milliLiter(s) IntraMuscular once  insulin lispro (ADMELOG) corrective regimen sliding scale   SubCutaneous every 6 hours  lisinopril 20 milliGRAM(s) Oral daily  metoprolol succinate ER 12.5 milliGRAM(s) Oral daily  metroNIDAZOLE  IVPB 500 milliGRAM(s) IV Intermittent every 8 hours    MEDICATIONS  (PRN):  acetaminophen     Tablet .. 650 milliGRAM(s) Oral every 6 hours PRN Temp greater or equal to 38C (100.4F), Mild Pain (1 - 3)  dextrose Oral Gel 15 Gram(s) Oral once PRN Blood Glucose LESS THAN 70 milliGRAM(s)/deciliter  ondansetron Injectable 4 milliGRAM(s) IV Push every 8 hours PRN Nausea and/or Vomiting  traMADol 50 milliGRAM(s) Oral once PRN Severe Pain (7 - 10)      Allergies    penicillin (Hives)      PHYSICAL EXAM:   Vital Signs:  Vital Signs Last 24 Hrs  T(C): 36.6 (06 Dec 2022 05:34), Max: 36.7 (05 Dec 2022 12:42)  T(F): 97.8 (06 Dec 2022 05:34), Max: 98 (05 Dec 2022 12:42)  HR: 64 (06 Dec 2022 05:34) (64 - 75)  BP: 145/75 (06 Dec 2022 05:34) (135/80 - 159/75)  BP(mean): --  RR: 17 (06 Dec 2022 05:34) (17 - 18)  SpO2: 95% (06 Dec 2022 05:34) (93% - 95%)    Parameters below as of 06 Dec 2022 05:34  Patient On (Oxygen Delivery Method): room air      GENERAL:  Appears stated age  ABDOMEN:  Soft, non-tender, non-distended  NEURO:  Alert      LABS:                        11.6   6.54  )-----------( 285      ( 06 Dec 2022 06:41 )             35.4     12-06    139  |  106  |  15  ----------------------------<  140<H>  3.8   |  26  |  1.10    Ca    8.7      06 Dec 2022 06:41  Phos  2.5     12-05  Mg     2.0     12-06    TPro  6.0  /  Alb  2.2<L>  /  TBili  0.3  /  DBili  x   /  AST  14<L>  /  ALT  26  /  AlkPhos  103  12-06      Urinalysis Basic - ( 05 Dec 2022 08:11 )    Color: Yellow / Appearance: Clear / S.010 / pH: x  Gluc: x / Ketone: Negative  / Bili: Negative / Urobili: Negative   Blood: x / Protein: Negative / Nitrite: Negative   Leuk Esterase: Negative / RBC: x / WBC x   Sq Epi: x / Non Sq Epi: x / Bacteria: x     [facility]  John Marcelino PA-C  [address] [place] [address]  [phone]      INTERVAL HPI/OVERNIGHT EVENTS:  Pt s/e with daughter at bedside  Reports no abdominal pain today  Planned for MRCP    MEDICATIONS  (STANDING):  atorvastatin 20 milliGRAM(s) Oral at bedtime  cefTRIAXone   IVPB 1000 milliGRAM(s) IV Intermittent every 24 hours  cyclobenzaprine 5 milliGRAM(s) Oral once  dextrose 5%. 1000 milliLiter(s) (100 mL/Hr) IV Continuous <Continuous>  dextrose 5%. 1000 milliLiter(s) (50 mL/Hr) IV Continuous <Continuous>  dextrose 50% Injectable 25 Gram(s) IV Push once  dextrose 50% Injectable 12.5 Gram(s) IV Push once  dextrose 50% Injectable 25 Gram(s) IV Push once  DULoxetine 30 milliGRAM(s) Oral daily  enoxaparin Injectable 40 milliGRAM(s) SubCutaneous every 24 hours  furosemide   Injectable 40 milliGRAM(s) IV Push daily  glucagon  Injectable 1 milliGRAM(s) IntraMuscular once  influenza  Vaccine (HIGH DOSE) 0.7 milliLiter(s) IntraMuscular once  insulin lispro (ADMELOG) corrective regimen sliding scale   SubCutaneous every 6 hours  lisinopril 20 milliGRAM(s) Oral daily  metoprolol succinate ER 12.5 milliGRAM(s) Oral daily  metroNIDAZOLE  IVPB 500 milliGRAM(s) IV Intermittent every 8 hours    MEDICATIONS  (PRN):  acetaminophen     Tablet .. 650 milliGRAM(s) Oral every 6 hours PRN Temp greater or equal to 38C (100.4F), Mild Pain (1 - 3)  dextrose Oral Gel 15 Gram(s) Oral once PRN Blood Glucose LESS THAN 70 milliGRAM(s)/deciliter  ondansetron Injectable 4 milliGRAM(s) IV Push every 8 hours PRN Nausea and/or Vomiting  traMADol 50 milliGRAM(s) Oral once PRN Severe Pain (7 - 10)      Allergies    penicillin (Hives)      PHYSICAL EXAM:   Vital Signs:  Vital Signs Last 24 Hrs  T(C): 36.6 (06 Dec 2022 05:34), Max: 36.7 (05 Dec 2022 12:42)  T(F): 97.8 (06 Dec 2022 05:34), Max: 98 (05 Dec 2022 12:42)  HR: 64 (06 Dec 2022 05:34) (64 - 75)  BP: 145/75 (06 Dec 2022 05:34) (135/80 - 159/75)  BP(mean): --  RR: 17 (06 Dec 2022 05:34) (17 - 18)  SpO2: 95% (06 Dec 2022 05:34) (93% - 95%)    Parameters below as of 06 Dec 2022 05:34  Patient On (Oxygen Delivery Method): room air      GENERAL:  Appears stated age  ABDOMEN:  Soft, non-tender, non-distended  NEURO:  Alert      LABS:                        11.6   6.54  )-----------( 285      ( 06 Dec 2022 06:41 )             35.4     12-06    139  |  106  |  15  ----------------------------<  140<H>  3.8   |  26  |  1.10    Ca    8.7      06 Dec 2022 06:41  Phos  2.5     12-05  Mg     2.0     12-06    TPro  6.0  /  Alb  2.2<L>  /  TBili  0.3  /  DBili  x   /  AST  14<L>  /  ALT  26  /  AlkPhos  103  12-06      Urinalysis Basic - ( 05 Dec 2022 08:11 )    Color: Yellow / Appearance: Clear / S.010 / pH: x  Gluc: x / Ketone: Negative  / Bili: Negative / Urobili: Negative   Blood: x / Protein: Negative / Nitrite: Negative   Leuk Esterase: Negative / RBC: x / WBC x   Sq Epi: x / Non Sq Epi: x / Bacteria: x     [facility]  John Marcelino PA-C  [address] [place] [address]  [phone]      INTERVAL HPI/OVERNIGHT EVENTS:  Pt s/e with daughter at bedside  Reports no abdominal pain today  Planned for MRCP    MEDICATIONS  (STANDING):  atorvastatin 20 milliGRAM(s) Oral at bedtime  cefTRIAXone   IVPB 1000 milliGRAM(s) IV Intermittent every 24 hours  cyclobenzaprine 5 milliGRAM(s) Oral once  dextrose 5%. 1000 milliLiter(s) (100 mL/Hr) IV Continuous <Continuous>  dextrose 5%. 1000 milliLiter(s) (50 mL/Hr) IV Continuous <Continuous>  dextrose 50% Injectable 25 Gram(s) IV Push once  dextrose 50% Injectable 12.5 Gram(s) IV Push once  dextrose 50% Injectable 25 Gram(s) IV Push once  DULoxetine 30 milliGRAM(s) Oral daily  enoxaparin Injectable 40 milliGRAM(s) SubCutaneous every 24 hours  furosemide   Injectable 40 milliGRAM(s) IV Push daily  glucagon  Injectable 1 milliGRAM(s) IntraMuscular once  influenza  Vaccine (HIGH DOSE) 0.7 milliLiter(s) IntraMuscular once  insulin lispro (ADMELOG) corrective regimen sliding scale   SubCutaneous every 6 hours  lisinopril 20 milliGRAM(s) Oral daily  metoprolol succinate ER 12.5 milliGRAM(s) Oral daily  metroNIDAZOLE  IVPB 500 milliGRAM(s) IV Intermittent every 8 hours    MEDICATIONS  (PRN):  acetaminophen     Tablet .. 650 milliGRAM(s) Oral every 6 hours PRN Temp greater or equal to 38C (100.4F), Mild Pain (1 - 3)  dextrose Oral Gel 15 Gram(s) Oral once PRN Blood Glucose LESS THAN 70 milliGRAM(s)/deciliter  ondansetron Injectable 4 milliGRAM(s) IV Push every 8 hours PRN Nausea and/or Vomiting  traMADol 50 milliGRAM(s) Oral once PRN Severe Pain (7 - 10)      Allergies    penicillin (Hives)      PHYSICAL EXAM:   Vital Signs:  Vital Signs Last 24 Hrs  T(C): 36.6 (06 Dec 2022 05:34), Max: 36.7 (05 Dec 2022 12:42)  T(F): 97.8 (06 Dec 2022 05:34), Max: 98 (05 Dec 2022 12:42)  HR: 64 (06 Dec 2022 05:34) (64 - 75)  BP: 145/75 (06 Dec 2022 05:34) (135/80 - 159/75)  BP(mean): --  RR: 17 (06 Dec 2022 05:34) (17 - 18)  SpO2: 95% (06 Dec 2022 05:34) (93% - 95%)    Parameters below as of 06 Dec 2022 05:34  Patient On (Oxygen Delivery Method): room air      GENERAL:  Appears stated age  ABDOMEN:  Soft, non-tender, non-distended  NEURO:  Alert      LABS:                        11.6   6.54  )-----------( 285      ( 06 Dec 2022 06:41 )             35.4     12-06    139  |  106  |  15  ----------------------------<  140<H>  3.8   |  26  |  1.10    Ca    8.7      06 Dec 2022 06:41  Phos  2.5     12-05  Mg     2.0     12-06    TPro  6.0  /  Alb  2.2<L>  /  TBili  0.3  /  DBili  x   /  AST  14<L>  /  ALT  26  /  AlkPhos  103  12-06      Urinalysis Basic - ( 05 Dec 2022 08:11 )    Color: Yellow / Appearance: Clear / S.010 / pH: x  Gluc: x / Ketone: Negative  / Bili: Negative / Urobili: Negative   Blood: x / Protein: Negative / Nitrite: Negative   Leuk Esterase: Negative / RBC: x / WBC x   Sq Epi: x / Non Sq Epi: x / Bacteria: x

## 2022-12-06 NOTE — PROGRESS NOTE ADULT - SUBJECTIVE AND OBJECTIVE BOX
[facility] -- Jeri Ridley,  Nasim, Antonio Melgar Savella, Goodger  Office # [phone]    Follow Up:  Volume Overload/Edema    Subjective/Observations: No events overnight resting comfortably in bed.  No complaints of chest pain, dyspnea, or palpitations reported. No signs of orthopnea or PND.    REVIEW OF SYSTEMS: All other review of systems is negative unless indicated above  PAST MEDICAL & SURGICAL HISTORY:  Diabetes mellitus      Hyperlipidemia      Hypertension      S/P cholecystectomy  11/2022        MEDICATIONS  (STANDING):  atorvastatin 20 milliGRAM(s) Oral at bedtime  cefTRIAXone   IVPB 1000 milliGRAM(s) IV Intermittent every 24 hours  dextrose 5%. 1000 milliLiter(s) (100 mL/Hr) IV Continuous <Continuous>  dextrose 5%. 1000 milliLiter(s) (50 mL/Hr) IV Continuous <Continuous>  dextrose 50% Injectable 25 Gram(s) IV Push once  dextrose 50% Injectable 12.5 Gram(s) IV Push once  dextrose 50% Injectable 25 Gram(s) IV Push once  DULoxetine 30 milliGRAM(s) Oral daily  enoxaparin Injectable 40 milliGRAM(s) SubCutaneous every 24 hours  furosemide   Injectable 40 milliGRAM(s) IV Push daily  glucagon  Injectable 1 milliGRAM(s) IntraMuscular once  influenza  Vaccine (HIGH DOSE) 0.7 milliLiter(s) IntraMuscular once  insulin lispro (ADMELOG) corrective regimen sliding scale   SubCutaneous every 6 hours  lisinopril 20 milliGRAM(s) Oral daily  metoprolol succinate ER 12.5 milliGRAM(s) Oral daily  metroNIDAZOLE  IVPB 500 milliGRAM(s) IV Intermittent every 8 hours    MEDICATIONS  (PRN):  acetaminophen     Tablet .. 650 milliGRAM(s) Oral every 6 hours PRN Temp greater or equal to 38C (100.4F), Mild Pain (1 - 3)  dextrose Oral Gel 15 Gram(s) Oral once PRN Blood Glucose LESS THAN 70 milliGRAM(s)/deciliter  ondansetron Injectable 4 milliGRAM(s) IV Push every 8 hours PRN Nausea and/or Vomiting  traMADol 50 milliGRAM(s) Oral once PRN Severe Pain (7 - 10)    Allergies    penicillin (Hives)    Intolerances      Vital Signs Last 24 Hrs  T(C): 36.6 (06 Dec 2022 05:34), Max: 36.7 (05 Dec 2022 12:42)  T(F): 97.8 (06 Dec 2022 05:34), Max: 98 (05 Dec 2022 12:42)  HR: 64 (06 Dec 2022 05:34) (64 - 85)  BP: 145/75 (06 Dec 2022 05:34) (135/80 - 161/76)  BP(mean): --  RR: 17 (06 Dec 2022 05:34) (17 - 18)  SpO2: 95% (06 Dec 2022 05:34) (93% - 96%)    Parameters below as of 06 Dec 2022 05:34  Patient On (Oxygen Delivery Method): room air      I&O's Summary    05 Dec 2022 07:01  -  06 Dec 2022 07:00  --------------------------------------------------------  IN: 250 mL / OUT: 1400 mL / NET: -1150 mL        TELE: off  PHYSICAL EXAM:  Constitutional: NAD, alert and oriented x 3, obese  Lungs:  Decreased breath sounds b/l. No rales, crackles or wheeze appreciated.   Cardiovascular: RRR.  S1 and S2 positive.  +murmurs, norubs, gallops or clicks  Gastrointestinal: Bowel Sounds present, soft, +tenderness.  Healing incisions from milind  Lymph:  +trace BLE edema. No cervical lymphadenopathy.  Neurological: Alert, no focal deficits  Skin: No rashes or ulcers.  +erythema BLE  Psych:  Mood & affect appropriate.      LABS: All Labs Reviewed:                        11.6   6.54  )-----------( 285      ( 06 Dec 2022 06:41 )             35.4                         12.3   8.49  )-----------( 276      ( 05 Dec 2022 05:37 )             39.5                         12.2   10.08 )-----------( 292      ( 04 Dec 2022 08:30 )             37.3     06 Dec 2022 06:41    139    |  106    |  15     ----------------------------<  140    3.8     |  26     |  1.10   05 Dec 2022 05:37    140    |  109    |  14     ----------------------------<  135    4.0     |  23     |  1.10   04 Dec 2022 08:30    142    |  111    |  17     ----------------------------<  155    4.4     |  24     |  1.20     Ca    8.7        06 Dec 2022 06:41  Ca    8.6        05 Dec 2022 05:37  Ca    8.7        04 Dec 2022 08:30  Phos  2.5       05 Dec 2022 05:37  Mg     2.0       06 Dec 2022 06:41  Mg     2.1       05 Dec 2022 05:37  Mg     2.2       04 Dec 2022 08:30    TPro  6.0    /  Alb  2.2    /  TBili  0.3    /  DBili  x      /  AST  14     /  ALT  26     /  AlkPhos  103    06 Dec 2022 06:41  TPro  6.8    /  Alb  2.5    /  TBili  0.4    /  DBili  x      /  AST  16     /  ALT  42     /  AlkPhos  132    05 Dec 2022 05:37  TPro  6.4    /  Alb  2.4    /  TBili  0.4    /  DBili  x      /  AST  17     /  ALT  47     /  AlkPhos  139    04 Dec 2022 08:30          12 Lead ECG:   Ventricular Rate 68 BPM    Atrial Rate 68 BPM    P-R Interval 146 ms    QRS Duration 76 ms    Q-T Interval 410 ms    QTC Calculation(Bazett) 435 ms    P Axis 61 degrees    R Axis -5 degrees    T Axis 111 degrees    Diagnosis Line *** Poor data quality, interpretation may be adversely affected  Normal sinus rhythm  ST & T wave abnormality, consider lateral ischemia  Abnormal ECG  No previous ECGs available  Confirmed by MARLENY FONSECA ([id number]) on 12/4/2022 4:09:07 PM (12-04-22 @ 09:20)        [facility] -- Jeri Ridley,  Nasim, Antonio Melgar Savella, Goodger  Office # [phone]    Follow Up:  Volume Overload/Edema    Subjective/Observations: No events overnight resting comfortably in bed.  No complaints of chest pain, dyspnea, or palpitations reported. No signs of orthopnea or PND.    REVIEW OF SYSTEMS: All other review of systems is negative unless indicated above  PAST MEDICAL & SURGICAL HISTORY:  Diabetes mellitus      Hyperlipidemia      Hypertension      S/P cholecystectomy  11/2022        MEDICATIONS  (STANDING):  atorvastatin 20 milliGRAM(s) Oral at bedtime  cefTRIAXone   IVPB 1000 milliGRAM(s) IV Intermittent every 24 hours  dextrose 5%. 1000 milliLiter(s) (100 mL/Hr) IV Continuous <Continuous>  dextrose 5%. 1000 milliLiter(s) (50 mL/Hr) IV Continuous <Continuous>  dextrose 50% Injectable 25 Gram(s) IV Push once  dextrose 50% Injectable 12.5 Gram(s) IV Push once  dextrose 50% Injectable 25 Gram(s) IV Push once  DULoxetine 30 milliGRAM(s) Oral daily  enoxaparin Injectable 40 milliGRAM(s) SubCutaneous every 24 hours  furosemide   Injectable 40 milliGRAM(s) IV Push daily  glucagon  Injectable 1 milliGRAM(s) IntraMuscular once  influenza  Vaccine (HIGH DOSE) 0.7 milliLiter(s) IntraMuscular once  insulin lispro (ADMELOG) corrective regimen sliding scale   SubCutaneous every 6 hours  lisinopril 20 milliGRAM(s) Oral daily  metoprolol succinate ER 12.5 milliGRAM(s) Oral daily  metroNIDAZOLE  IVPB 500 milliGRAM(s) IV Intermittent every 8 hours    MEDICATIONS  (PRN):  acetaminophen     Tablet .. 650 milliGRAM(s) Oral every 6 hours PRN Temp greater or equal to 38C (100.4F), Mild Pain (1 - 3)  dextrose Oral Gel 15 Gram(s) Oral once PRN Blood Glucose LESS THAN 70 milliGRAM(s)/deciliter  ondansetron Injectable 4 milliGRAM(s) IV Push every 8 hours PRN Nausea and/or Vomiting  traMADol 50 milliGRAM(s) Oral once PRN Severe Pain (7 - 10)    Allergies    penicillin (Hives)    Intolerances      Vital Signs Last 24 Hrs  T(C): 36.6 (06 Dec 2022 05:34), Max: 36.7 (05 Dec 2022 12:42)  T(F): 97.8 (06 Dec 2022 05:34), Max: 98 (05 Dec 2022 12:42)  HR: 64 (06 Dec 2022 05:34) (64 - 85)  BP: 145/75 (06 Dec 2022 05:34) (135/80 - 161/76)  BP(mean): --  RR: 17 (06 Dec 2022 05:34) (17 - 18)  SpO2: 95% (06 Dec 2022 05:34) (93% - 96%)    Parameters below as of 06 Dec 2022 05:34  Patient On (Oxygen Delivery Method): room air      I&O's Summary    05 Dec 2022 07:01  -  06 Dec 2022 07:00  --------------------------------------------------------  IN: 250 mL / OUT: 1400 mL / NET: -1150 mL        TELE: off  PHYSICAL EXAM:  Constitutional: NAD, alert and oriented x 3, obese  Lungs:  Decreased breath sounds b/l. No rales, crackles or wheeze appreciated.   Cardiovascular: RRR.  S1 and S2 positive.  +murmurs, norubs, gallops or clicks  Gastrointestinal: Bowel Sounds present, soft, +tenderness.  Healing incisions from milind  Lymph:  +trace BLE edema. No cervical lymphadenopathy.  Neurological: Alert, no focal deficits  Skin: No rashes or ulcers.  +erythema BLE  Psych:  Mood & affect appropriate.      LABS: All Labs Reviewed:                        11.6   6.54  )-----------( 285      ( 06 Dec 2022 06:41 )             35.4                         12.3   8.49  )-----------( 276      ( 05 Dec 2022 05:37 )             39.5                         12.2   10.08 )-----------( 292      ( 04 Dec 2022 08:30 )             37.3     06 Dec 2022 06:41    139    |  106    |  15     ----------------------------<  140    3.8     |  26     |  1.10   05 Dec 2022 05:37    140    |  109    |  14     ----------------------------<  135    4.0     |  23     |  1.10   04 Dec 2022 08:30    142    |  111    |  17     ----------------------------<  155    4.4     |  24     |  1.20     Ca    8.7        06 Dec 2022 06:41  Ca    8.6        05 Dec 2022 05:37  Ca    8.7        04 Dec 2022 08:30  Phos  2.5       05 Dec 2022 05:37  Mg     2.0       06 Dec 2022 06:41  Mg     2.1       05 Dec 2022 05:37  Mg     2.2       04 Dec 2022 08:30    TPro  6.0    /  Alb  2.2    /  TBili  0.3    /  DBili  x      /  AST  14     /  ALT  26     /  AlkPhos  103    06 Dec 2022 06:41  TPro  6.8    /  Alb  2.5    /  TBili  0.4    /  DBili  x      /  AST  16     /  ALT  42     /  AlkPhos  132    05 Dec 2022 05:37  TPro  6.4    /  Alb  2.4    /  TBili  0.4    /  DBili  x      /  AST  17     /  ALT  47     /  AlkPhos  139    04 Dec 2022 08:30          12 Lead ECG:   Ventricular Rate 68 BPM    Atrial Rate 68 BPM    P-R Interval 146 ms    QRS Duration 76 ms    Q-T Interval 410 ms    QTC Calculation(Bazett) 435 ms    P Axis 61 degrees    R Axis -5 degrees    T Axis 111 degrees    Diagnosis Line *** Poor data quality, interpretation may be adversely affected  Normal sinus rhythm  ST & T wave abnormality, consider lateral ischemia  Abnormal ECG  No previous ECGs available  Confirmed by MARLENY FONSECA ([id number]) on 12/4/2022 4:09:07 PM (12-04-22 @ 09:20)        [facility] -- Jeri Ridley,  Naism, Antonio Melgar Savella, Goodger  Office # [phone]    Follow Up:  Volume Overload/Edema    Subjective/Observations: No events overnight resting comfortably in bed.  No complaints of chest pain, dyspnea, or palpitations reported. No signs of orthopnea or PND.    REVIEW OF SYSTEMS: All other review of systems is negative unless indicated above  PAST MEDICAL & SURGICAL HISTORY:  Diabetes mellitus      Hyperlipidemia      Hypertension      S/P cholecystectomy  11/2022        MEDICATIONS  (STANDING):  atorvastatin 20 milliGRAM(s) Oral at bedtime  cefTRIAXone   IVPB 1000 milliGRAM(s) IV Intermittent every 24 hours  dextrose 5%. 1000 milliLiter(s) (100 mL/Hr) IV Continuous <Continuous>  dextrose 5%. 1000 milliLiter(s) (50 mL/Hr) IV Continuous <Continuous>  dextrose 50% Injectable 25 Gram(s) IV Push once  dextrose 50% Injectable 12.5 Gram(s) IV Push once  dextrose 50% Injectable 25 Gram(s) IV Push once  DULoxetine 30 milliGRAM(s) Oral daily  enoxaparin Injectable 40 milliGRAM(s) SubCutaneous every 24 hours  furosemide   Injectable 40 milliGRAM(s) IV Push daily  glucagon  Injectable 1 milliGRAM(s) IntraMuscular once  influenza  Vaccine (HIGH DOSE) 0.7 milliLiter(s) IntraMuscular once  insulin lispro (ADMELOG) corrective regimen sliding scale   SubCutaneous every 6 hours  lisinopril 20 milliGRAM(s) Oral daily  metoprolol succinate ER 12.5 milliGRAM(s) Oral daily  metroNIDAZOLE  IVPB 500 milliGRAM(s) IV Intermittent every 8 hours    MEDICATIONS  (PRN):  acetaminophen     Tablet .. 650 milliGRAM(s) Oral every 6 hours PRN Temp greater or equal to 38C (100.4F), Mild Pain (1 - 3)  dextrose Oral Gel 15 Gram(s) Oral once PRN Blood Glucose LESS THAN 70 milliGRAM(s)/deciliter  ondansetron Injectable 4 milliGRAM(s) IV Push every 8 hours PRN Nausea and/or Vomiting  traMADol 50 milliGRAM(s) Oral once PRN Severe Pain (7 - 10)    Allergies    penicillin (Hives)    Intolerances      Vital Signs Last 24 Hrs  T(C): 36.6 (06 Dec 2022 05:34), Max: 36.7 (05 Dec 2022 12:42)  T(F): 97.8 (06 Dec 2022 05:34), Max: 98 (05 Dec 2022 12:42)  HR: 64 (06 Dec 2022 05:34) (64 - 85)  BP: 145/75 (06 Dec 2022 05:34) (135/80 - 161/76)  BP(mean): --  RR: 17 (06 Dec 2022 05:34) (17 - 18)  SpO2: 95% (06 Dec 2022 05:34) (93% - 96%)    Parameters below as of 06 Dec 2022 05:34  Patient On (Oxygen Delivery Method): room air      I&O's Summary    05 Dec 2022 07:01  -  06 Dec 2022 07:00  --------------------------------------------------------  IN: 250 mL / OUT: 1400 mL / NET: -1150 mL        TELE: off  PHYSICAL EXAM:  Constitutional: NAD, alert and oriented x 3, obese  Lungs:  Decreased breath sounds b/l. No rales, crackles or wheeze appreciated.   Cardiovascular: RRR.  S1 and S2 positive.  +murmurs, norubs, gallops or clicks  Gastrointestinal: Bowel Sounds present, soft, +tenderness.  Healing incisions from milind  Lymph:  +trace BLE edema. No cervical lymphadenopathy.  Neurological: Alert, no focal deficits  Skin: No rashes or ulcers.  +erythema BLE  Psych:  Mood & affect appropriate.      LABS: All Labs Reviewed:                        11.6   6.54  )-----------( 285      ( 06 Dec 2022 06:41 )             35.4                         12.3   8.49  )-----------( 276      ( 05 Dec 2022 05:37 )             39.5                         12.2   10.08 )-----------( 292      ( 04 Dec 2022 08:30 )             37.3     06 Dec 2022 06:41    139    |  106    |  15     ----------------------------<  140    3.8     |  26     |  1.10   05 Dec 2022 05:37    140    |  109    |  14     ----------------------------<  135    4.0     |  23     |  1.10   04 Dec 2022 08:30    142    |  111    |  17     ----------------------------<  155    4.4     |  24     |  1.20     Ca    8.7        06 Dec 2022 06:41  Ca    8.6        05 Dec 2022 05:37  Ca    8.7        04 Dec 2022 08:30  Phos  2.5       05 Dec 2022 05:37  Mg     2.0       06 Dec 2022 06:41  Mg     2.1       05 Dec 2022 05:37  Mg     2.2       04 Dec 2022 08:30    TPro  6.0    /  Alb  2.2    /  TBili  0.3    /  DBili  x      /  AST  14     /  ALT  26     /  AlkPhos  103    06 Dec 2022 06:41  TPro  6.8    /  Alb  2.5    /  TBili  0.4    /  DBili  x      /  AST  16     /  ALT  42     /  AlkPhos  132    05 Dec 2022 05:37  TPro  6.4    /  Alb  2.4    /  TBili  0.4    /  DBili  x      /  AST  17     /  ALT  47     /  AlkPhos  139    04 Dec 2022 08:30          12 Lead ECG:   Ventricular Rate 68 BPM    Atrial Rate 68 BPM    P-R Interval 146 ms    QRS Duration 76 ms    Q-T Interval 410 ms    QTC Calculation(Bazett) 435 ms    P Axis 61 degrees    R Axis -5 degrees    T Axis 111 degrees    Diagnosis Line *** Poor data quality, interpretation may be adversely affected  Normal sinus rhythm  ST & T wave abnormality, consider lateral ischemia  Abnormal ECG  No previous ECGs available  Confirmed by MARLENY FONSECA ([id number]) on 12/4/2022 4:09:07 PM (12-04-22 @ 09:20)

## 2022-12-06 NOTE — PROGRESS NOTE ADULT - ASSESSMENT
76 y/o F with no cardiac Hx, s/p cholecystectomy 1 week ago presented to the ED c/o fever and some abdominal tenderness, found to have main pulmonary enlargement and possible volume overload.  Also noted to have possible cellulitis with superimposed edema of BLE.    Volume Overload/Edema  - No known Hx of CAD or HF  - Pro-BNP is elevated at 2000 and has BLE edema with concurrent cellulitis  - s/p Lasix 40 mg IV  - volume status improved, would switch to Lasix po   - Strict I/O's and daily weights  - Obtain TTE to assess cardiac structures.    - CT chest suggestive of pHTN.  Also has cardiac murmur of mild-moderate in significance    - EKG with poor baseline, appears SR without sig ischemia. repeat EKG pending    - Sepsis w/u per Primary  - Monitor and replete lytes, keep K>4, Mg>2.  - Will continue to follow.    Symone Stuart NP  Nurse Practitioner- Cardiology   Spectra #[phone]  78 y/o F with no cardiac Hx, s/p cholecystectomy 1 week ago presented to the ED c/o fever and some abdominal tenderness, found to have main pulmonary enlargement and possible volume overload.  Also noted to have possible cellulitis with superimposed edema of BLE.    Volume Overload/Edema  - No known Hx of CAD or HF  - Pro-BNP is elevated at 2000 and has BLE edema with concurrent cellulitis  - s/p Lasix 40 mg IV  - volume status improved, would switch to Lasix po   - Strict I/O's and daily weights  - Obtain TTE to assess cardiac structures.    - CT chest suggestive of pHTN.  Also has cardiac murmur of mild-moderate in significance    - EKG with poor baseline, appears SR without sig ischemia. repeat EKG pending    - Sepsis w/u per Primary  - Monitor and replete lytes, keep K>4, Mg>2.  - Will continue to follow.    Symone Stuart NP  Nurse Practitioner- Cardiology   Spectra #[phone]  78 y/o F with no cardiac Hx, s/p cholecystectomy 1 week ago presented to the ED c/o fever and some abdominal tenderness, found to have main pulmonary enlargement and possible volume overload.  Also noted to have possible cellulitis with superimposed edema of BLE.    Volume Overload/Edema  - No known Hx of CAD or HF  - Pro-BNP is elevated at 2000 and has BLE edema with concurrent cellulitis  - s/p Lasix 40 mg IV  - volume status improved, would switch to Lasix po   - Strict I/O's and daily weights  - Obtain TTE to assess cardiac structures.    - CT chest suggestive of pHTN.  Also has cardiac murmur of mild-moderate in significance    - EKG with poor baseline, appears SR without sig ischemia. repeat EKG pending    - Sepsis w/u per Primary  - Monitor and replete lytes, keep K>4, Mg>2.  - Will continue to follow.    Symone Stuart NP  Nurse Practitioner- Cardiology   Spectra #[phone]

## 2022-12-06 NOTE — PROGRESS NOTE ADULT - TIME BILLING
activities including direct patient care, counseling and/or coordinating care, reviewing notes/lab data/imaging, and discussion with multidisciplinary team
activities including direct patient care, counseling and/or coordinating care, reviewing notes/lab data/imaging, and discussion with multidisciplinary team

## 2022-12-06 NOTE — PROGRESS NOTE ADULT - ASSESSMENT
cbd dilation  inc lft  sepsis    LFTs do not suggest biliary obstruction; cont to trend  MRCP ordered; follow up  If positive for stone will need ERCP  Pain control  ID following  D/w pt and daughter at bedside  WIll follow    I reviewed the overnight course of events on the unit, re-confirming the patient history. I discussed the care with the patient and their family  Differential diagnosis and plan of care discussed with patient after the evaluation  35 minutes spent on total encounter of which more than fifty percent of the encounter was spent counseling and/or coordinating care by the attending physician.  Advanced care planning was discussed with patient and family.  Advanced care planning forms were reviewed and discussed.  Risks, benefits and alternatives of gastroenterologic procedures were discussed in detail and all questions were answered.

## 2022-12-07 LAB
ALBUMIN SERPL ELPH-MCNC: 2.3 G/DL — LOW (ref 3.3–5)
ALP SERPL-CCNC: 104 U/L — SIGNIFICANT CHANGE UP (ref 40–120)
ALT FLD-CCNC: 27 U/L — SIGNIFICANT CHANGE UP (ref 12–78)
ANION GAP SERPL CALC-SCNC: 9 MMOL/L — SIGNIFICANT CHANGE UP (ref 5–17)
AST SERPL-CCNC: 14 U/L — LOW (ref 15–37)
BASOPHILS # BLD AUTO: 0.03 K/UL — SIGNIFICANT CHANGE UP (ref 0–0.2)
BASOPHILS NFR BLD AUTO: 0.4 % — SIGNIFICANT CHANGE UP (ref 0–2)
BILIRUB SERPL-MCNC: 0.2 MG/DL — SIGNIFICANT CHANGE UP (ref 0.2–1.2)
BUN SERPL-MCNC: 16 MG/DL — SIGNIFICANT CHANGE UP (ref 7–23)
CALCIUM SERPL-MCNC: 8.7 MG/DL — SIGNIFICANT CHANGE UP (ref 8.5–10.1)
CHLORIDE SERPL-SCNC: 104 MMOL/L — SIGNIFICANT CHANGE UP (ref 96–108)
CO2 SERPL-SCNC: 27 MMOL/L — SIGNIFICANT CHANGE UP (ref 22–31)
CREAT SERPL-MCNC: 1.1 MG/DL — SIGNIFICANT CHANGE UP (ref 0.5–1.3)
EGFR: 51 ML/MIN/1.73M2 — LOW
EOSINOPHIL # BLD AUTO: 0.34 K/UL — SIGNIFICANT CHANGE UP (ref 0–0.5)
EOSINOPHIL NFR BLD AUTO: 5 % — SIGNIFICANT CHANGE UP (ref 0–6)
GLUCOSE SERPL-MCNC: 105 MG/DL — HIGH (ref 70–99)
HCT VFR BLD CALC: 36 % — SIGNIFICANT CHANGE UP (ref 34.5–45)
HGB BLD-MCNC: 11.8 G/DL — SIGNIFICANT CHANGE UP (ref 11.5–15.5)
IMM GRANULOCYTES NFR BLD AUTO: 1.9 % — HIGH (ref 0–0.9)
LYMPHOCYTES # BLD AUTO: 1.23 K/UL — SIGNIFICANT CHANGE UP (ref 1–3.3)
LYMPHOCYTES # BLD AUTO: 18.2 % — SIGNIFICANT CHANGE UP (ref 13–44)
MAGNESIUM SERPL-MCNC: 2.2 MG/DL — SIGNIFICANT CHANGE UP (ref 1.6–2.6)
MCHC RBC-ENTMCNC: 30.5 PG — SIGNIFICANT CHANGE UP (ref 27–34)
MCHC RBC-ENTMCNC: 32.8 GM/DL — SIGNIFICANT CHANGE UP (ref 32–36)
MCV RBC AUTO: 93 FL — SIGNIFICANT CHANGE UP (ref 80–100)
MONOCYTES # BLD AUTO: 0.77 K/UL — SIGNIFICANT CHANGE UP (ref 0–0.9)
MONOCYTES NFR BLD AUTO: 11.4 % — SIGNIFICANT CHANGE UP (ref 2–14)
NEUTROPHILS # BLD AUTO: 4.25 K/UL — SIGNIFICANT CHANGE UP (ref 1.8–7.4)
NEUTROPHILS NFR BLD AUTO: 63.1 % — SIGNIFICANT CHANGE UP (ref 43–77)
NRBC # BLD: 0 /100 WBCS — SIGNIFICANT CHANGE UP (ref 0–0)
PHOSPHATE SERPL-MCNC: 2.8 MG/DL — SIGNIFICANT CHANGE UP (ref 2.5–4.5)
PLATELET # BLD AUTO: 331 K/UL — SIGNIFICANT CHANGE UP (ref 150–400)
POTASSIUM SERPL-MCNC: 3.7 MMOL/L — SIGNIFICANT CHANGE UP (ref 3.5–5.3)
POTASSIUM SERPL-SCNC: 3.7 MMOL/L — SIGNIFICANT CHANGE UP (ref 3.5–5.3)
PROT SERPL-MCNC: 6.1 G/DL — SIGNIFICANT CHANGE UP (ref 6–8.3)
RBC # BLD: 3.87 M/UL — SIGNIFICANT CHANGE UP (ref 3.8–5.2)
RBC # FLD: 13.8 % — SIGNIFICANT CHANGE UP (ref 10.3–14.5)
SODIUM SERPL-SCNC: 140 MMOL/L — SIGNIFICANT CHANGE UP (ref 135–145)
WBC # BLD: 6.75 K/UL — SIGNIFICANT CHANGE UP (ref 3.8–10.5)
WBC # FLD AUTO: 6.75 K/UL — SIGNIFICANT CHANGE UP (ref 3.8–10.5)

## 2022-12-07 PROCEDURE — 99232 SBSQ HOSP IP/OBS MODERATE 35: CPT

## 2022-12-07 PROCEDURE — 99233 SBSQ HOSP IP/OBS HIGH 50: CPT

## 2022-12-07 RX ORDER — SODIUM CHLORIDE 9 MG/ML
1000 INJECTION, SOLUTION INTRAVENOUS
Refills: 0 | Status: DISCONTINUED | OUTPATIENT
Start: 2022-12-07 | End: 2022-12-09

## 2022-12-07 RX ORDER — INSULIN LISPRO 100/ML
VIAL (ML) SUBCUTANEOUS
Refills: 0 | Status: DISCONTINUED | OUTPATIENT
Start: 2022-12-07 | End: 2022-12-09

## 2022-12-07 RX ORDER — DEXTROSE 50 % IN WATER 50 %
25 SYRINGE (ML) INTRAVENOUS ONCE
Refills: 0 | Status: DISCONTINUED | OUTPATIENT
Start: 2022-12-07 | End: 2022-12-09

## 2022-12-07 RX ORDER — DEXTROSE 50 % IN WATER 50 %
15 SYRINGE (ML) INTRAVENOUS ONCE
Refills: 0 | Status: DISCONTINUED | OUTPATIENT
Start: 2022-12-07 | End: 2022-12-09

## 2022-12-07 RX ORDER — INSULIN LISPRO 100/ML
VIAL (ML) SUBCUTANEOUS AT BEDTIME
Refills: 0 | Status: DISCONTINUED | OUTPATIENT
Start: 2022-12-07 | End: 2022-12-09

## 2022-12-07 RX ORDER — GLUCAGON INJECTION, SOLUTION 0.5 MG/.1ML
1 INJECTION, SOLUTION SUBCUTANEOUS ONCE
Refills: 0 | Status: DISCONTINUED | OUTPATIENT
Start: 2022-12-07 | End: 2022-12-09

## 2022-12-07 RX ORDER — DEXTROSE 50 % IN WATER 50 %
12.5 SYRINGE (ML) INTRAVENOUS ONCE
Refills: 0 | Status: DISCONTINUED | OUTPATIENT
Start: 2022-12-07 | End: 2022-12-09

## 2022-12-07 RX ADMIN — ATORVASTATIN CALCIUM 20 MILLIGRAM(S): 80 TABLET, FILM COATED ORAL at 22:34

## 2022-12-07 RX ADMIN — LISINOPRIL 20 MILLIGRAM(S): 2.5 TABLET ORAL at 05:51

## 2022-12-07 RX ADMIN — Medication 12.5 MILLIGRAM(S): at 05:51

## 2022-12-07 RX ADMIN — Medication 40 MILLIGRAM(S): at 05:51

## 2022-12-07 RX ADMIN — Medication 100 MILLIGRAM(S): at 22:34

## 2022-12-07 RX ADMIN — DULOXETINE HYDROCHLORIDE 30 MILLIGRAM(S): 30 CAPSULE, DELAYED RELEASE ORAL at 12:14

## 2022-12-07 RX ADMIN — CEFTRIAXONE 100 MILLIGRAM(S): 500 INJECTION, POWDER, FOR SOLUTION INTRAMUSCULAR; INTRAVENOUS at 09:30

## 2022-12-07 RX ADMIN — Medication 100 MILLIGRAM(S): at 14:00

## 2022-12-07 RX ADMIN — Medication 100 MILLIGRAM(S): at 05:51

## 2022-12-07 RX ADMIN — ENOXAPARIN SODIUM 40 MILLIGRAM(S): 100 INJECTION SUBCUTANEOUS at 12:14

## 2022-12-07 NOTE — DIETITIAN INITIAL EVALUATION ADULT - PERTINENT LABORATORY DATA
12-07    140  |  104  |  16  ----------------------------<  105<H>  3.7   |  27  |  1.10    Ca    8.7      07 Dec 2022 06:50  Phos  2.8     12-07  Mg     2.2     12-07    TPro  6.1  /  Alb  2.3<L>  /  TBili  0.2  /  DBili  x   /  AST  14<L>  /  ALT  27  /  AlkPhos  104  12-07  POCT Blood Glucose.: 117 mg/dL (12-07-22 @ 11:38)  A1C with Estimated Average Glucose Result: 6.5 % (12-05-22 @ 05:37)

## 2022-12-07 NOTE — PROGRESS NOTE ADULT - ASSESSMENT
76 y/o F with no cardiac Hx, s/p cholecystectomy 1 week ago presented to the ED c/o fever and some abdominal tenderness, found to have main pulmonary enlargement and possible volume overload.  Also noted to have possible cellulitis with superimposed edema of BLE.    Volume Overload/Edema  - No known Hx of CAD or HF  - Pro-BNP is elevated at 2000 and has BLE edema with concurrent cellulitis  - s/p Lasix 40 mg IV  - volume status improved, switch to Lasix po   - Strict I/O's and daily weights  - Obtain TTE to assess cardiac structures.    - CT chest suggestive of pHTN.  Also has cardiac murmur of mild-moderate in significance    - EKG with poor baseline, appears SR without sig ischemia.  - continue statin    - BP mostly controlled, continue BB and lisinopril. Can increase BB.     - Sepsis w/u per Primary  - Monitor and replete lytes, keep K>4, Mg>2.  - Will continue to follow.    Symone Stuart NP  Nurse Practitioner- Cardiology   Spectra #[phone]  76 y/o F with no cardiac Hx, s/p cholecystectomy 1 week ago presented to the ED c/o fever and some abdominal tenderness, found to have main pulmonary enlargement and possible volume overload.  Also noted to have possible cellulitis with superimposed edema of BLE.    Volume Overload/Edema  - No known Hx of CAD or HF  - Pro-BNP is elevated at 2000 and has BLE edema with concurrent cellulitis  - s/p Lasix 40 mg IV  - volume status improved, switch to Lasix po   - Strict I/O's and daily weights  - Obtain TTE to assess cardiac structures.    - CT chest suggestive of pHTN.  Also has cardiac murmur of mild-moderate in significance    - EKG with poor baseline, appears SR without sig ischemia.  - continue statin    - BP mostly controlled, continue BB and lisinopril. Can increase BB.     - Sepsis w/u per Primary  - Monitor and replete lytes, keep K>4, Mg>2.  - Will continue to follow.    Symone Stuart NP  Nurse Practitioner- Cardiology   Spectra #[phone]  76 y/o F with no cardiac Hx, s/p cholecystectomy 1 week ago presented to the ED c/o fever and some abdominal tenderness, found to have main pulmonary enlargement and possible volume overload.  Also noted to have possible cellulitis with superimposed edema of BLE.    Volume Overload/Edema  - No known Hx of CAD or HF  - Pro-BNP is elevated at 2000 and has BLE edema with concurrent cellulitis  - s/p Lasix 40 mg IV  - volume status improved, switch to Lasix po   - Strict I/O's and daily weights  - Obtain TTE to assess cardiac structures.    - CT chest suggestive of pHTN.  Also has cardiac murmur of mild-moderate in significance    - EKG with poor baseline, appears SR without sig ischemia.  - continue statin    - BP mostly controlled, continue BB and lisinopril. Can increase BB.     - Sepsis w/u per Primary  - Monitor and replete lytes, keep K>4, Mg>2.  - Will continue to follow.    Symone Stuart NP  Nurse Practitioner- Cardiology   Spectra #[phone]

## 2022-12-07 NOTE — PROGRESS NOTE ADULT - ASSESSMENT
78 y/o F PMHx of dementia, HTN, HLD s/p cholecystectomy 1 week ago who presented w/ SOB and abd discomfort.     SOB- resolved  abd pain- resolved  US LE neg for DVT  s/p CTA- 1. No pulmonary embolus. Elevated left hemidiaphragm and partial left lower lobe atelectasis. Superimposed infection cannot be excluded  s/p CT abd/pelvis- cholecystectomy. No loculated fluid collection along the gallbladder fossa. Mild central biliary distention   s/p MRCP- Status post cholecystectomy with mild central intrahepatic biliary ductal dilatation, periductal enhancement and edema/ hyperenhancement in the gallbladder fossa; No choledocholithiasis.  s/p TTE  f/u blood cx- NGTD  low suspicion for PNA but will plan to complete empiric 5 day course of antibiotics for PNA and for intra-abdominal coverage  c/w ceftriaxone/ flagyl to complete empiric 5 day course of antibiotics, last day tomorrow  can transition to cefpodoxime 200mg PO bid and flagyl 500mg PO every 8 hours on discharge    cellulitis- resolved  daughter reports LLE shin w/ bright red erythema which has improved since starting antibiotics  no erythema present at this time  c/w ceftriaxone as above    edema  diuresis per cards    Rl Lyn M.D.  [facility], Division of Infectious Diseases  [phone]  After 5pm on weekdays and all day on weekends - please call [phone] 76 y/o F PMHx of dementia, HTN, HLD s/p cholecystectomy 1 week ago who presented w/ SOB and abd discomfort.     SOB- resolved  abd pain- resolved  US LE neg for DVT  s/p CTA- 1. No pulmonary embolus. Elevated left hemidiaphragm and partial left lower lobe atelectasis. Superimposed infection cannot be excluded  s/p CT abd/pelvis- cholecystectomy. No loculated fluid collection along the gallbladder fossa. Mild central biliary distention   s/p MRCP- Status post cholecystectomy with mild central intrahepatic biliary ductal dilatation, periductal enhancement and edema/ hyperenhancement in the gallbladder fossa; No choledocholithiasis.  s/p TTE  f/u blood cx- NGTD  low suspicion for PNA but will plan to complete empiric 5 day course of antibiotics for PNA and for intra-abdominal coverage  c/w ceftriaxone/ flagyl to complete empiric 5 day course of antibiotics, last day tomorrow  can transition to cefpodoxime 200mg PO bid and flagyl 500mg PO every 8 hours on discharge    cellulitis- resolved  daughter reports LLE shin w/ bright red erythema which has improved since starting antibiotics  no erythema present at this time  c/w ceftriaxone as above    edema  diuresis per cards    Rl Lyn M.D.  [facility], Division of Infectious Diseases  [phone]  After 5pm on weekdays and all day on weekends - please call [phone] 76 y/o F PMHx of dementia, HTN, HLD s/p cholecystectomy 1 week ago who presented w/ SOB and abd discomfort.     SOB- resolved  abd pain- resolved  US LE neg for DVT  s/p CTA- 1. No pulmonary embolus. Elevated left hemidiaphragm and partial left lower lobe atelectasis. Superimposed infection cannot be excluded  s/p CT abd/pelvis- cholecystectomy. No loculated fluid collection along the gallbladder fossa. Mild central biliary distention   s/p MRCP- Status post cholecystectomy with mild central intrahepatic biliary ductal dilatation, periductal enhancement and edema/ hyperenhancement in the gallbladder fossa; No choledocholithiasis.  s/p TTE  f/u blood cx- NGTD  low suspicion for PNA but will plan to complete empiric 5 day course of antibiotics for PNA and for intra-abdominal coverage  c/w ceftriaxone/ flagyl to complete empiric 5 day course of antibiotics, last day tomorrow  can transition to cefpodoxime 200mg PO bid and flagyl 500mg PO every 8 hours on discharge    cellulitis- resolved  daughter reports LLE shin w/ bright red erythema which has improved since starting antibiotics  no erythema present at this time  c/w ceftriaxone as above    edema  diuresis per cards    Rl Lyn M.D.  [facility], Division of Infectious Diseases  [phone]  After 5pm on weekdays and all day on weekends - please call [phone]

## 2022-12-07 NOTE — DIETITIAN INITIAL EVALUATION ADULT - PERTINENT MEDS FT
MEDICATIONS  (STANDING):  atorvastatin 20 milliGRAM(s) Oral at bedtime  cefTRIAXone   IVPB 1000 milliGRAM(s) IV Intermittent every 24 hours  dextrose 5%. 1000 milliLiter(s) (100 mL/Hr) IV Continuous <Continuous>  dextrose 5%. 1000 milliLiter(s) (50 mL/Hr) IV Continuous <Continuous>  dextrose 50% Injectable 25 Gram(s) IV Push once  dextrose 50% Injectable 12.5 Gram(s) IV Push once  dextrose 50% Injectable 25 Gram(s) IV Push once  DULoxetine 30 milliGRAM(s) Oral daily  enoxaparin Injectable 40 milliGRAM(s) SubCutaneous every 24 hours  furosemide   Injectable 40 milliGRAM(s) IV Push daily  glucagon  Injectable 1 milliGRAM(s) IntraMuscular once  influenza  Vaccine (HIGH DOSE) 0.7 milliLiter(s) IntraMuscular once  insulin lispro (ADMELOG) corrective regimen sliding scale   SubCutaneous every 6 hours  lisinopril 20 milliGRAM(s) Oral daily  metoprolol succinate ER 12.5 milliGRAM(s) Oral daily  metroNIDAZOLE  IVPB 500 milliGRAM(s) IV Intermittent every 8 hours    MEDICATIONS  (PRN):  acetaminophen     Tablet .. 650 milliGRAM(s) Oral every 6 hours PRN Temp greater or equal to 38C (100.4F), Mild Pain (1 - 3)  dextrose Oral Gel 15 Gram(s) Oral once PRN Blood Glucose LESS THAN 70 milliGRAM(s)/deciliter  ondansetron Injectable 4 milliGRAM(s) IV Push every 8 hours PRN Nausea and/or Vomiting  traMADol 50 milliGRAM(s) Oral once PRN Severe Pain (7 - 10)

## 2022-12-07 NOTE — DIETITIAN INITIAL EVALUATION ADULT - OTHER INFO
s/p MRCP  Dtr states UBW ~200#, current wt 186#; states it is possible pt lost a few lbs due to hospitalization last week and now being NPO x 3 days here.

## 2022-12-07 NOTE — PROGRESS NOTE ADULT - SUBJECTIVE AND OBJECTIVE BOX
[facility] -- Jeri Ridley,  Nasim, Antonio Melgar Savella, [place]  Office # [phone]    Follow Up:  Volume Overload/Edema    Subjective/Observations: No events overnight resting comfortably in bed.  No complaints of chest pain, dyspnea, or palpitations reported. No signs of orthopnea or PND.  Daughter at the bedside.     REVIEW OF SYSTEMS: All other review of systems is negative unless indicated above  PAST MEDICAL & SURGICAL HISTORY:  Diabetes mellitus      Hyperlipidemia      Hypertension      Right cataract  surgery 11/29/22      Glaucoma      S/P cholecystectomy  11/29/2022        MEDICATIONS  (STANDING):  atorvastatin 20 milliGRAM(s) Oral at bedtime  cefTRIAXone   IVPB 1000 milliGRAM(s) IV Intermittent every 24 hours  dextrose 5%. 1000 milliLiter(s) (50 mL/Hr) IV Continuous <Continuous>  dextrose 5%. 1000 milliLiter(s) (100 mL/Hr) IV Continuous <Continuous>  dextrose 50% Injectable 25 Gram(s) IV Push once  dextrose 50% Injectable 12.5 Gram(s) IV Push once  dextrose 50% Injectable 25 Gram(s) IV Push once  DULoxetine 30 milliGRAM(s) Oral daily  enoxaparin Injectable 40 milliGRAM(s) SubCutaneous every 24 hours  furosemide   Injectable 40 milliGRAM(s) IV Push daily  glucagon  Injectable 1 milliGRAM(s) IntraMuscular once  influenza  Vaccine (HIGH DOSE) 0.7 milliLiter(s) IntraMuscular once  insulin lispro (ADMELOG) corrective regimen sliding scale   SubCutaneous every 6 hours  lisinopril 20 milliGRAM(s) Oral daily  metoprolol succinate ER 12.5 milliGRAM(s) Oral daily  metroNIDAZOLE  IVPB 500 milliGRAM(s) IV Intermittent every 8 hours    MEDICATIONS  (PRN):  acetaminophen     Tablet .. 650 milliGRAM(s) Oral every 6 hours PRN Temp greater or equal to 38C (100.4F), Mild Pain (1 - 3)  dextrose Oral Gel 15 Gram(s) Oral once PRN Blood Glucose LESS THAN 70 milliGRAM(s)/deciliter  ondansetron Injectable 4 milliGRAM(s) IV Push every 8 hours PRN Nausea and/or Vomiting  traMADol 50 milliGRAM(s) Oral once PRN Severe Pain (7 - 10)    Allergies    penicillin (Hives)    Intolerances      Vital Signs Last 24 Hrs  T(C): 36.9 (07 Dec 2022 05:23), Max: 36.9 (07 Dec 2022 05:23)  T(F): 98.4 (07 Dec 2022 05:23), Max: 98.4 (07 Dec 2022 05:23)  HR: 70 (07 Dec 2022 05:23) (68 - 70)  BP: 156/78 (07 Dec 2022 05:23) (140/68 - 156/78)  BP(mean): --  RR: 18 (07 Dec 2022 05:23) (18 - 18)  SpO2: 95% (07 Dec 2022 05:23) (93% - 95%)    Parameters below as of 07 Dec 2022 05:23  Patient On (Oxygen Delivery Method): room air      I&O's Summary    06 Dec 2022 07:01  -  07 Dec 2022 07:00  --------------------------------------------------------  IN: 0 mL / OUT: 2400 mL / NET: -2400 mL    07 Dec 2022 07:01  -  07 Dec 2022 10:33  --------------------------------------------------------  IN: 0 mL / OUT: 0 mL / NET: 0 mL        TELE: off  PHYSICAL EXAM:  Constitutional: NAD, alert and oriented x 3, obese  Lungs:  Decreased breath sounds b/l. No rales, crackles or wheeze appreciated.   Cardiovascular: RRR.  S1 and S2 positive.  +murmurs, norubs, gallops or clicks  Gastrointestinal: Bowel Sounds present, soft, +tenderness.  Healing incisions from milind  Lymph:  +trace BLE edema. No cervical lymphadenopathy.  Neurological: Alert, no focal deficits  Skin: No rashes or ulcers.  +erythema BLE  Psych:  Mood & affect appropriate.    LABS: All Labs Reviewed:                        11.8   6.75  )-----------( 331      ( 07 Dec 2022 06:50 )             36.0                         11.6   6.54  )-----------( 285      ( 06 Dec 2022 06:41 )             35.4                         12.3   8.49  )-----------( 276      ( 05 Dec 2022 05:37 )             39.5     07 Dec 2022 06:50    140    |  104    |  16     ----------------------------<  105    3.7     |  27     |  1.10   06 Dec 2022 06:41    139    |  106    |  15     ----------------------------<  140    3.8     |  26     |  1.10   05 Dec 2022 05:37    140    |  109    |  14     ----------------------------<  135    4.0     |  23     |  1.10     Ca    8.7        07 Dec 2022 06:50  Ca    8.7        06 Dec 2022 06:41  Ca    8.6        05 Dec 2022 05:37  Phos  2.8       07 Dec 2022 06:50  Phos  2.5       05 Dec 2022 05:37  Mg     2.2       07 Dec 2022 06:50  Mg     2.0       06 Dec 2022 06:41  Mg     2.1       05 Dec 2022 05:37    TPro  6.1    /  Alb  2.3    /  TBili  0.2    /  DBili  x      /  AST  14     /  ALT  27     /  AlkPhos  104    07 Dec 2022 06:50  TPro  6.0    /  Alb  2.2    /  TBili  0.3    /  DBili  x      /  AST  14     /  ALT  26     /  AlkPhos  103    06 Dec 2022 06:41  TPro  6.8    /  Alb  2.5    /  TBili  0.4    /  DBili  x      /  AST  16     /  ALT  42     /  AlkPhos  132    05 Dec 2022 05:37          12 Lead ECG:   Ventricular Rate 68 BPM    Atrial Rate 68 BPM    P-R Interval 146 ms    QRS Duration 76 ms    Q-T Interval 410 ms    QTC Calculation(Bazett) 435 ms    P Axis 61 degrees    R Axis -5 degrees    T Axis 111 degrees    Diagnosis Line *** Poor data quality, interpretation may be adversely affected  Normal sinus rhythm  ST & T wave abnormality, consider lateral ischemia  Abnormal ECG  No previous ECGs available  Confirmed by MARLENY FONSECA ([id number]) on 12/4/2022 4:09:07 PM (12-04-22 @ 09:20)      ACC: [id number] EXAM:  ECHO TTE WO CON COMP W DOPP                          PROCEDURE DATE:  12/05/2022          INTERPRETATION:  INDICATION: Edema  Sonographer AS    Blood Pressure 135/80    Height 162 cm     Weight 90 kg       BSA 1.9 sq m    Dimensions:  LA 3.4       Normal Values: 2.0 - 4.0 cm  Ao 3.1        Normal Values: 2.0 - 3.8 cm  SEPTUM 1.1       Normal Values: 0.6 - 1.2 cm  PWT 1.1       Normal Values: 0.6 - 1.1 cm  LVIDd 3.3         Normal Values: 3.0 - 5.6 cm  LVIDs 2.2         Normal Values: 1.8 - 4.0 cm      OBSERVATIONS:  Technically difficult and limited study  Mitral Valve: Mitral annular calcification with thickened leaflets, trace   physiologic MR.  Aortic Valve/Aorta: The aortic valve is not well-visualized but appears   calcified. Peak transaortic valve gradient is 29 mmHg with a mean   transaortic valve gradient of 17 mmHg. The aortic valve area is   calculated to be 1.6 sq cm by continuity equation. This consistent with   mild aortic stenosis.  Tricuspid Valve: normal with trace TR  Pulmonic Valve: Trace PI  Left Atrium: normal  Right Atrium: Not well-visualized  Left Ventricle: normal LV size and systolic function, estimated LVEF of   60-65%.  Right Ventricle: Grossly normal size and systolic function.  Pericardium: no significant pericardial effusion.  Pulmonary/RV Pressure: estimated PA systolic pressure of 21 mmHg assuming   an RA pressure of 3 mmHg.  The IVC measures 0.9 cm        IMPRESSION:  Technically difficult and limited study  Normal left ventricular internal dimensions and systolic function,   estimated LVEF of 60-65%.  Grossly normal RV size and systolic function.  Calcified aortic valve with mild aortic stenosis, without AI.  Trace physiologic MR and TR.  No significant pericardial effusion.    --- End of Report ---            DARRON LAMA MD; Attending Cardiologist  This document has been electronically signed. Dec  6 2022  5:51PM      [facility] -- Jeri Ridley,  Nasim, Antonio Melgar Savella, [place]  Office # [phone]    Follow Up:  Volume Overload/Edema    Subjective/Observations: No events overnight resting comfortably in bed.  No complaints of chest pain, dyspnea, or palpitations reported. No signs of orthopnea or PND.  Daughter at the bedside.     REVIEW OF SYSTEMS: All other review of systems is negative unless indicated above  PAST MEDICAL & SURGICAL HISTORY:  Diabetes mellitus      Hyperlipidemia      Hypertension      Right cataract  surgery 11/29/22      Glaucoma      S/P cholecystectomy  11/29/2022        MEDICATIONS  (STANDING):  atorvastatin 20 milliGRAM(s) Oral at bedtime  cefTRIAXone   IVPB 1000 milliGRAM(s) IV Intermittent every 24 hours  dextrose 5%. 1000 milliLiter(s) (50 mL/Hr) IV Continuous <Continuous>  dextrose 5%. 1000 milliLiter(s) (100 mL/Hr) IV Continuous <Continuous>  dextrose 50% Injectable 25 Gram(s) IV Push once  dextrose 50% Injectable 12.5 Gram(s) IV Push once  dextrose 50% Injectable 25 Gram(s) IV Push once  DULoxetine 30 milliGRAM(s) Oral daily  enoxaparin Injectable 40 milliGRAM(s) SubCutaneous every 24 hours  furosemide   Injectable 40 milliGRAM(s) IV Push daily  glucagon  Injectable 1 milliGRAM(s) IntraMuscular once  influenza  Vaccine (HIGH DOSE) 0.7 milliLiter(s) IntraMuscular once  insulin lispro (ADMELOG) corrective regimen sliding scale   SubCutaneous every 6 hours  lisinopril 20 milliGRAM(s) Oral daily  metoprolol succinate ER 12.5 milliGRAM(s) Oral daily  metroNIDAZOLE  IVPB 500 milliGRAM(s) IV Intermittent every 8 hours    MEDICATIONS  (PRN):  acetaminophen     Tablet .. 650 milliGRAM(s) Oral every 6 hours PRN Temp greater or equal to 38C (100.4F), Mild Pain (1 - 3)  dextrose Oral Gel 15 Gram(s) Oral once PRN Blood Glucose LESS THAN 70 milliGRAM(s)/deciliter  ondansetron Injectable 4 milliGRAM(s) IV Push every 8 hours PRN Nausea and/or Vomiting  traMADol 50 milliGRAM(s) Oral once PRN Severe Pain (7 - 10)    Allergies    penicillin (Hives)    Intolerances      Vital Signs Last 24 Hrs  T(C): 36.9 (07 Dec 2022 05:23), Max: 36.9 (07 Dec 2022 05:23)  T(F): 98.4 (07 Dec 2022 05:23), Max: 98.4 (07 Dec 2022 05:23)  HR: 70 (07 Dec 2022 05:23) (68 - 70)  BP: 156/78 (07 Dec 2022 05:23) (140/68 - 156/78)  BP(mean): --  RR: 18 (07 Dec 2022 05:23) (18 - 18)  SpO2: 95% (07 Dec 2022 05:23) (93% - 95%)    Parameters below as of 07 Dec 2022 05:23  Patient On (Oxygen Delivery Method): room air      I&O's Summary    06 Dec 2022 07:01  -  07 Dec 2022 07:00  --------------------------------------------------------  IN: 0 mL / OUT: 2400 mL / NET: -2400 mL    07 Dec 2022 07:01  -  07 Dec 2022 10:33  --------------------------------------------------------  IN: 0 mL / OUT: 0 mL / NET: 0 mL        TELE: off  PHYSICAL EXAM:  Constitutional: NAD, alert and oriented x 3, obese  Lungs:  Decreased breath sounds b/l. No rales, crackles or wheeze appreciated.   Cardiovascular: RRR.  S1 and S2 positive.  +murmurs, norubs, gallops or clicks  Gastrointestinal: Bowel Sounds present, soft, +tenderness.  Healing incisions from milind  Lymph:  +trace BLE edema. No cervical lymphadenopathy.  Neurological: Alert, no focal deficits  Skin: No rashes or ulcers.  +erythema BLE  Psych:  Mood & affect appropriate.    LABS: All Labs Reviewed:                        11.8   6.75  )-----------( 331      ( 07 Dec 2022 06:50 )             36.0                         11.6   6.54  )-----------( 285      ( 06 Dec 2022 06:41 )             35.4                         12.3   8.49  )-----------( 276      ( 05 Dec 2022 05:37 )             39.5     07 Dec 2022 06:50    140    |  104    |  16     ----------------------------<  105    3.7     |  27     |  1.10   06 Dec 2022 06:41    139    |  106    |  15     ----------------------------<  140    3.8     |  26     |  1.10   05 Dec 2022 05:37    140    |  109    |  14     ----------------------------<  135    4.0     |  23     |  1.10     Ca    8.7        07 Dec 2022 06:50  Ca    8.7        06 Dec 2022 06:41  Ca    8.6        05 Dec 2022 05:37  Phos  2.8       07 Dec 2022 06:50  Phos  2.5       05 Dec 2022 05:37  Mg     2.2       07 Dec 2022 06:50  Mg     2.0       06 Dec 2022 06:41  Mg     2.1       05 Dec 2022 05:37    TPro  6.1    /  Alb  2.3    /  TBili  0.2    /  DBili  x      /  AST  14     /  ALT  27     /  AlkPhos  104    07 Dec 2022 06:50  TPro  6.0    /  Alb  2.2    /  TBili  0.3    /  DBili  x      /  AST  14     /  ALT  26     /  AlkPhos  103    06 Dec 2022 06:41  TPro  6.8    /  Alb  2.5    /  TBili  0.4    /  DBili  x      /  AST  16     /  ALT  42     /  AlkPhos  132    05 Dec 2022 05:37          12 Lead ECG:   Ventricular Rate 68 BPM    Atrial Rate 68 BPM    P-R Interval 146 ms    QRS Duration 76 ms    Q-T Interval 410 ms    QTC Calculation(Bazett) 435 ms    P Axis 61 degrees    R Axis -5 degrees    T Axis 111 degrees    Diagnosis Line *** Poor data quality, interpretation may be adversely affected  Normal sinus rhythm  ST & T wave abnormality, consider lateral ischemia  Abnormal ECG  No previous ECGs available  Confirmed by MARLENY FONSECA ([id number]) on 12/4/2022 4:09:07 PM (12-04-22 @ 09:20)      ACC: [id number] EXAM:  ECHO TTE WO CON COMP W DOPP                          PROCEDURE DATE:  12/05/2022          INTERPRETATION:  INDICATION: Edema  Sonographer AS    Blood Pressure 135/80    Height 162 cm     Weight 90 kg       BSA 1.9 sq m    Dimensions:  LA 3.4       Normal Values: 2.0 - 4.0 cm  Ao 3.1        Normal Values: 2.0 - 3.8 cm  SEPTUM 1.1       Normal Values: 0.6 - 1.2 cm  PWT 1.1       Normal Values: 0.6 - 1.1 cm  LVIDd 3.3         Normal Values: 3.0 - 5.6 cm  LVIDs 2.2         Normal Values: 1.8 - 4.0 cm      OBSERVATIONS:  Technically difficult and limited study  Mitral Valve: Mitral annular calcification with thickened leaflets, trace   physiologic MR.  Aortic Valve/Aorta: The aortic valve is not well-visualized but appears   calcified. Peak transaortic valve gradient is 29 mmHg with a mean   transaortic valve gradient of 17 mmHg. The aortic valve area is   calculated to be 1.6 sq cm by continuity equation. This consistent with   mild aortic stenosis.  Tricuspid Valve: normal with trace TR  Pulmonic Valve: Trace PI  Left Atrium: normal  Right Atrium: Not well-visualized  Left Ventricle: normal LV size and systolic function, estimated LVEF of   60-65%.  Right Ventricle: Grossly normal size and systolic function.  Pericardium: no significant pericardial effusion.  Pulmonary/RV Pressure: estimated PA systolic pressure of 21 mmHg assuming   an RA pressure of 3 mmHg.  The IVC measures 0.9 cm        IMPRESSION:  Technically difficult and limited study  Normal left ventricular internal dimensions and systolic function,   estimated LVEF of 60-65%.  Grossly normal RV size and systolic function.  Calcified aortic valve with mild aortic stenosis, without AI.  Trace physiologic MR and TR.  No significant pericardial effusion.    --- End of Report ---            DARRON LAMA MD; Attending Cardiologist  This document has been electronically signed. Dec  6 2022  5:51PM      [facility] -- Jeri Ridley,  Nasim, Antonio Melgar Savella, Goodger  Office # [phone]    Follow Up:  Volume Overload/Edema    Subjective/Observations: No events overnight resting comfortably in bed.  No complaints of chest pain, dyspnea, or palpitations reported. No signs of orthopnea or PND.  Daughter at the bedside.     REVIEW OF SYSTEMS: All other review of systems is negative unless indicated above  PAST MEDICAL & SURGICAL HISTORY:  Diabetes mellitus      Hyperlipidemia      Hypertension      Right cataract  surgery 11/29/22      Glaucoma      S/P cholecystectomy  11/29/2022        MEDICATIONS  (STANDING):  atorvastatin 20 milliGRAM(s) Oral at bedtime  cefTRIAXone   IVPB 1000 milliGRAM(s) IV Intermittent every 24 hours  dextrose 5%. 1000 milliLiter(s) (50 mL/Hr) IV Continuous <Continuous>  dextrose 5%. 1000 milliLiter(s) (100 mL/Hr) IV Continuous <Continuous>  dextrose 50% Injectable 25 Gram(s) IV Push once  dextrose 50% Injectable 12.5 Gram(s) IV Push once  dextrose 50% Injectable 25 Gram(s) IV Push once  DULoxetine 30 milliGRAM(s) Oral daily  enoxaparin Injectable 40 milliGRAM(s) SubCutaneous every 24 hours  furosemide   Injectable 40 milliGRAM(s) IV Push daily  glucagon  Injectable 1 milliGRAM(s) IntraMuscular once  influenza  Vaccine (HIGH DOSE) 0.7 milliLiter(s) IntraMuscular once  insulin lispro (ADMELOG) corrective regimen sliding scale   SubCutaneous every 6 hours  lisinopril 20 milliGRAM(s) Oral daily  metoprolol succinate ER 12.5 milliGRAM(s) Oral daily  metroNIDAZOLE  IVPB 500 milliGRAM(s) IV Intermittent every 8 hours    MEDICATIONS  (PRN):  acetaminophen     Tablet .. 650 milliGRAM(s) Oral every 6 hours PRN Temp greater or equal to 38C (100.4F), Mild Pain (1 - 3)  dextrose Oral Gel 15 Gram(s) Oral once PRN Blood Glucose LESS THAN 70 milliGRAM(s)/deciliter  ondansetron Injectable 4 milliGRAM(s) IV Push every 8 hours PRN Nausea and/or Vomiting  traMADol 50 milliGRAM(s) Oral once PRN Severe Pain (7 - 10)    Allergies    penicillin (Hives)    Intolerances      Vital Signs Last 24 Hrs  T(C): 36.9 (07 Dec 2022 05:23), Max: 36.9 (07 Dec 2022 05:23)  T(F): 98.4 (07 Dec 2022 05:23), Max: 98.4 (07 Dec 2022 05:23)  HR: 70 (07 Dec 2022 05:23) (68 - 70)  BP: 156/78 (07 Dec 2022 05:23) (140/68 - 156/78)  BP(mean): --  RR: 18 (07 Dec 2022 05:23) (18 - 18)  SpO2: 95% (07 Dec 2022 05:23) (93% - 95%)    Parameters below as of 07 Dec 2022 05:23  Patient On (Oxygen Delivery Method): room air      I&O's Summary    06 Dec 2022 07:01  -  07 Dec 2022 07:00  --------------------------------------------------------  IN: 0 mL / OUT: 2400 mL / NET: -2400 mL    07 Dec 2022 07:01  -  07 Dec 2022 10:33  --------------------------------------------------------  IN: 0 mL / OUT: 0 mL / NET: 0 mL        TELE: off  PHYSICAL EXAM:  Constitutional: NAD, alert and oriented x 3, obese  Lungs:  Decreased breath sounds b/l. No rales, crackles or wheeze appreciated.   Cardiovascular: RRR.  S1 and S2 positive.  +murmurs, norubs, gallops or clicks  Gastrointestinal: Bowel Sounds present, soft, +tenderness.  Healing incisions from milind  Lymph:  +trace BLE edema. No cervical lymphadenopathy.  Neurological: Alert, no focal deficits  Skin: No rashes or ulcers.  +erythema BLE  Psych:  Mood & affect appropriate.    LABS: All Labs Reviewed:                        11.8   6.75  )-----------( 331      ( 07 Dec 2022 06:50 )             36.0                         11.6   6.54  )-----------( 285      ( 06 Dec 2022 06:41 )             35.4                         12.3   8.49  )-----------( 276      ( 05 Dec 2022 05:37 )             39.5     07 Dec 2022 06:50    140    |  104    |  16     ----------------------------<  105    3.7     |  27     |  1.10   06 Dec 2022 06:41    139    |  106    |  15     ----------------------------<  140    3.8     |  26     |  1.10   05 Dec 2022 05:37    140    |  109    |  14     ----------------------------<  135    4.0     |  23     |  1.10     Ca    8.7        07 Dec 2022 06:50  Ca    8.7        06 Dec 2022 06:41  Ca    8.6        05 Dec 2022 05:37  Phos  2.8       07 Dec 2022 06:50  Phos  2.5       05 Dec 2022 05:37  Mg     2.2       07 Dec 2022 06:50  Mg     2.0       06 Dec 2022 06:41  Mg     2.1       05 Dec 2022 05:37    TPro  6.1    /  Alb  2.3    /  TBili  0.2    /  DBili  x      /  AST  14     /  ALT  27     /  AlkPhos  104    07 Dec 2022 06:50  TPro  6.0    /  Alb  2.2    /  TBili  0.3    /  DBili  x      /  AST  14     /  ALT  26     /  AlkPhos  103    06 Dec 2022 06:41  TPro  6.8    /  Alb  2.5    /  TBili  0.4    /  DBili  x      /  AST  16     /  ALT  42     /  AlkPhos  132    05 Dec 2022 05:37          12 Lead ECG:   Ventricular Rate 68 BPM    Atrial Rate 68 BPM    P-R Interval 146 ms    QRS Duration 76 ms    Q-T Interval 410 ms    QTC Calculation(Bazett) 435 ms    P Axis 61 degrees    R Axis -5 degrees    T Axis 111 degrees    Diagnosis Line *** Poor data quality, interpretation may be adversely affected  Normal sinus rhythm  ST & T wave abnormality, consider lateral ischemia  Abnormal ECG  No previous ECGs available  Confirmed by MARLENY FONSECA ([id number]) on 12/4/2022 4:09:07 PM (12-04-22 @ 09:20)      ACC: [id number] EXAM:  ECHO TTE WO CON COMP W DOPP                          PROCEDURE DATE:  12/05/2022          INTERPRETATION:  INDICATION: Edema  Sonographer AS    Blood Pressure 135/80    Height 162 cm     Weight 90 kg       BSA 1.9 sq m    Dimensions:  LA 3.4       Normal Values: 2.0 - 4.0 cm  Ao 3.1        Normal Values: 2.0 - 3.8 cm  SEPTUM 1.1       Normal Values: 0.6 - 1.2 cm  PWT 1.1       Normal Values: 0.6 - 1.1 cm  LVIDd 3.3         Normal Values: 3.0 - 5.6 cm  LVIDs 2.2         Normal Values: 1.8 - 4.0 cm      OBSERVATIONS:  Technically difficult and limited study  Mitral Valve: Mitral annular calcification with thickened leaflets, trace   physiologic MR.  Aortic Valve/Aorta: The aortic valve is not well-visualized but appears   calcified. Peak transaortic valve gradient is 29 mmHg with a mean   transaortic valve gradient of 17 mmHg. The aortic valve area is   calculated to be 1.6 sq cm by continuity equation. This consistent with   mild aortic stenosis.  Tricuspid Valve: normal with trace TR  Pulmonic Valve: Trace PI  Left Atrium: normal  Right Atrium: Not well-visualized  Left Ventricle: normal LV size and systolic function, estimated LVEF of   60-65%.  Right Ventricle: Grossly normal size and systolic function.  Pericardium: no significant pericardial effusion.  Pulmonary/RV Pressure: estimated PA systolic pressure of 21 mmHg assuming   an RA pressure of 3 mmHg.  The IVC measures 0.9 cm        IMPRESSION:  Technically difficult and limited study  Normal left ventricular internal dimensions and systolic function,   estimated LVEF of 60-65%.  Grossly normal RV size and systolic function.  Calcified aortic valve with mild aortic stenosis, without AI.  Trace physiologic MR and TR.  No significant pericardial effusion.    --- End of Report ---            DARRON LAMA MD; Attending Cardiologist  This document has been electronically signed. Dec  6 2022  5:51PM

## 2022-12-07 NOTE — DIETITIAN INITIAL EVALUATION ADULT - REASON FOR ADMISSION
Fever due to unspecified condition    76 y/o F PMHx of dementia, T2DM, HTN, HLD, s/p cholecystectomy 1 week ago presented to the ED by ambulance for fever at home, abd discomfort/pain, and cough since last night 12/3 with CT showing mild central biliary distention admitted for cellulitis, PNA, possible choledocholithiasis s/p lap cholecystectomy and apparent volume overload. Fever due to unspecified condition    78 y/o F PMHx of dementia, T2DM, HTN, HLD, s/p cholecystectomy 1 week ago presented to the ED by ambulance for fever at home, abd discomfort/pain, and cough since last night 12/3 with CT showing mild central biliary distention admitted for cellulitis, PNA, possible choledocholithiasis s/p lap cholecystectomy and apparent volume overload.

## 2022-12-07 NOTE — DIETITIAN INITIAL EVALUATION ADULT - ORAL INTAKE PTA/DIET HISTORY
Pt avoids sweets at home, doesn't really check BS at home anymore. Last a1c was 6% ~5 mo ago per dtr.  Appetite usually good at home, pt here last week therefore intake not great then. Eats 3 meals a day usually.  Doesn't like fish.

## 2022-12-07 NOTE — PROGRESS NOTE ADULT - SUBJECTIVE AND OBJECTIVE BOX
[facility]  John Marcelino PA-C  [address] [place] [address]  [phone]      INTERVAL HPI/OVERNIGHT EVENTS:  Pt s/e  Reports no new GI events  Awaiting MRCP results    MEDICATIONS  (STANDING):  atorvastatin 20 milliGRAM(s) Oral at bedtime  cefTRIAXone   IVPB 1000 milliGRAM(s) IV Intermittent every 24 hours  dextrose 5%. 1000 milliLiter(s) (50 mL/Hr) IV Continuous <Continuous>  dextrose 5%. 1000 milliLiter(s) (100 mL/Hr) IV Continuous <Continuous>  dextrose 50% Injectable 25 Gram(s) IV Push once  dextrose 50% Injectable 12.5 Gram(s) IV Push once  dextrose 50% Injectable 25 Gram(s) IV Push once  DULoxetine 30 milliGRAM(s) Oral daily  enoxaparin Injectable 40 milliGRAM(s) SubCutaneous every 24 hours  furosemide   Injectable 40 milliGRAM(s) IV Push daily  glucagon  Injectable 1 milliGRAM(s) IntraMuscular once  influenza  Vaccine (HIGH DOSE) 0.7 milliLiter(s) IntraMuscular once  insulin lispro (ADMELOG) corrective regimen sliding scale   SubCutaneous every 6 hours  lisinopril 20 milliGRAM(s) Oral daily  metoprolol succinate ER 12.5 milliGRAM(s) Oral daily  metroNIDAZOLE  IVPB 500 milliGRAM(s) IV Intermittent every 8 hours    MEDICATIONS  (PRN):  acetaminophen     Tablet .. 650 milliGRAM(s) Oral every 6 hours PRN Temp greater or equal to 38C (100.4F), Mild Pain (1 - 3)  dextrose Oral Gel 15 Gram(s) Oral once PRN Blood Glucose LESS THAN 70 milliGRAM(s)/deciliter  ondansetron Injectable 4 milliGRAM(s) IV Push every 8 hours PRN Nausea and/or Vomiting  traMADol 50 milliGRAM(s) Oral once PRN Severe Pain (7 - 10)      Allergies    penicillin (Hives)    PHYSICAL EXAM:   Vital Signs:  Vital Signs Last 24 Hrs  T(C): 36.9 (07 Dec 2022 05:23), Max: 36.9 (07 Dec 2022 05:23)  T(F): 98.4 (07 Dec 2022 05:23), Max: 98.4 (07 Dec 2022 05:23)  HR: 70 (07 Dec 2022 05:23) (68 - 70)  BP: 156/78 (07 Dec 2022 05:23) (140/68 - 156/78)  BP(mean): --  RR: 18 (07 Dec 2022 05:23) (18 - 18)  SpO2: 95% (07 Dec 2022 05:) (93% - 95%)    Parameters below as of 07 Dec 2022 05:23  Patient On (Oxygen Delivery Method): room air      Daily     Daily Weight in k.4 (07 Dec 2022 05:23)    GENERAL:  Appears stated age  ABDOMEN:  Soft, non-tender, non-distended  NEURO:  Alert      LABS:                        11.8   6.75  )-----------( 331      ( 07 Dec 2022 06:50 )             36.0     12-07    140  |  104  |  16  ----------------------------<  105<H>  3.7   |  27  |  1.10    Ca    8.7      07 Dec 2022 06:50  Phos  2.8     12-  Mg     2.2     12-    TPro  6.1  /  Alb  2.3<L>  /  TBili  0.2  /  DBili  x   /  AST  14<L>  /  ALT  27  /  AlkPhos  104  12-     [facility]  John Marcelino PA-C  [address] [place] [address]  [phone]      INTERVAL HPI/OVERNIGHT EVENTS:  Pt s/e  Reports no new GI events  Awaiting MRCP results    MEDICATIONS  (STANDING):  atorvastatin 20 milliGRAM(s) Oral at bedtime  cefTRIAXone   IVPB 1000 milliGRAM(s) IV Intermittent every 24 hours  dextrose 5%. 1000 milliLiter(s) (50 mL/Hr) IV Continuous <Continuous>  dextrose 5%. 1000 milliLiter(s) (100 mL/Hr) IV Continuous <Continuous>  dextrose 50% Injectable 25 Gram(s) IV Push once  dextrose 50% Injectable 12.5 Gram(s) IV Push once  dextrose 50% Injectable 25 Gram(s) IV Push once  DULoxetine 30 milliGRAM(s) Oral daily  enoxaparin Injectable 40 milliGRAM(s) SubCutaneous every 24 hours  furosemide   Injectable 40 milliGRAM(s) IV Push daily  glucagon  Injectable 1 milliGRAM(s) IntraMuscular once  influenza  Vaccine (HIGH DOSE) 0.7 milliLiter(s) IntraMuscular once  insulin lispro (ADMELOG) corrective regimen sliding scale   SubCutaneous every 6 hours  lisinopril 20 milliGRAM(s) Oral daily  metoprolol succinate ER 12.5 milliGRAM(s) Oral daily  metroNIDAZOLE  IVPB 500 milliGRAM(s) IV Intermittent every 8 hours    MEDICATIONS  (PRN):  acetaminophen     Tablet .. 650 milliGRAM(s) Oral every 6 hours PRN Temp greater or equal to 38C (100.4F), Mild Pain (1 - 3)  dextrose Oral Gel 15 Gram(s) Oral once PRN Blood Glucose LESS THAN 70 milliGRAM(s)/deciliter  ondansetron Injectable 4 milliGRAM(s) IV Push every 8 hours PRN Nausea and/or Vomiting  traMADol 50 milliGRAM(s) Oral once PRN Severe Pain (7 - 10)      Allergies    penicillin (Hives)    PHYSICAL EXAM:   Vital Signs:  Vital Signs Last 24 Hrs  T(C): 36.9 (07 Dec 2022 05:23), Max: 36.9 (07 Dec 2022 05:23)  T(F): 98.4 (07 Dec 2022 05:23), Max: 98.4 (07 Dec 2022 05:23)  HR: 70 (07 Dec 2022 05:23) (68 - 70)  BP: 156/78 (07 Dec 2022 05:23) (140/68 - 156/78)  BP(mean): --  RR: 18 (07 Dec 2022 05:23) (18 - 18)  SpO2: 95% (07 Dec 2022 05:) (93% - 95%)    Parameters below as of 07 Dec 2022 05:23  Patient On (Oxygen Delivery Method): room air      Daily     Daily Weight in k.4 (07 Dec 2022 05:23)    GENERAL:  Appears stated age  ABDOMEN:  Soft, non-tender, non-distended  NEURO:  Alert      LABS:                        11.8   6.75  )-----------( 331      ( 07 Dec 2022 06:50 )             36.0     12-07    140  |  104  |  16  ----------------------------<  105<H>  3.7   |  27  |  1.10    Ca    8.7      07 Dec 2022 06:50  Phos  2.8     12-  Mg     2.2     12-    TPro  6.1  /  Alb  2.3<L>  /  TBili  0.2  /  DBili  x   /  AST  14<L>  /  ALT  27  /  AlkPhos  104  12-     [facility]  John Marcelino PA-C  [address] [place] [address]  [phone]      INTERVAL HPI/OVERNIGHT EVENTS:  Pt s/e  Reports no new GI events  Awaiting MRCP results    MEDICATIONS  (STANDING):  atorvastatin 20 milliGRAM(s) Oral at bedtime  cefTRIAXone   IVPB 1000 milliGRAM(s) IV Intermittent every 24 hours  dextrose 5%. 1000 milliLiter(s) (50 mL/Hr) IV Continuous <Continuous>  dextrose 5%. 1000 milliLiter(s) (100 mL/Hr) IV Continuous <Continuous>  dextrose 50% Injectable 25 Gram(s) IV Push once  dextrose 50% Injectable 12.5 Gram(s) IV Push once  dextrose 50% Injectable 25 Gram(s) IV Push once  DULoxetine 30 milliGRAM(s) Oral daily  enoxaparin Injectable 40 milliGRAM(s) SubCutaneous every 24 hours  furosemide   Injectable 40 milliGRAM(s) IV Push daily  glucagon  Injectable 1 milliGRAM(s) IntraMuscular once  influenza  Vaccine (HIGH DOSE) 0.7 milliLiter(s) IntraMuscular once  insulin lispro (ADMELOG) corrective regimen sliding scale   SubCutaneous every 6 hours  lisinopril 20 milliGRAM(s) Oral daily  metoprolol succinate ER 12.5 milliGRAM(s) Oral daily  metroNIDAZOLE  IVPB 500 milliGRAM(s) IV Intermittent every 8 hours    MEDICATIONS  (PRN):  acetaminophen     Tablet .. 650 milliGRAM(s) Oral every 6 hours PRN Temp greater or equal to 38C (100.4F), Mild Pain (1 - 3)  dextrose Oral Gel 15 Gram(s) Oral once PRN Blood Glucose LESS THAN 70 milliGRAM(s)/deciliter  ondansetron Injectable 4 milliGRAM(s) IV Push every 8 hours PRN Nausea and/or Vomiting  traMADol 50 milliGRAM(s) Oral once PRN Severe Pain (7 - 10)      Allergies    penicillin (Hives)    PHYSICAL EXAM:   Vital Signs:  Vital Signs Last 24 Hrs  T(C): 36.9 (07 Dec 2022 05:23), Max: 36.9 (07 Dec 2022 05:23)  T(F): 98.4 (07 Dec 2022 05:23), Max: 98.4 (07 Dec 2022 05:23)  HR: 70 (07 Dec 2022 05:23) (68 - 70)  BP: 156/78 (07 Dec 2022 05:23) (140/68 - 156/78)  BP(mean): --  RR: 18 (07 Dec 2022 05:23) (18 - 18)  SpO2: 95% (07 Dec 2022 05:) (93% - 95%)    Parameters below as of 07 Dec 2022 05:23  Patient On (Oxygen Delivery Method): room air      Daily     Daily Weight in k.4 (07 Dec 2022 05:23)    GENERAL:  Appears stated age  ABDOMEN:  Soft, non-tender, non-distended  NEURO:  Alert      LABS:                        11.8   6.75  )-----------( 331      ( 07 Dec 2022 06:50 )             36.0     12-07    140  |  104  |  16  ----------------------------<  105<H>  3.7   |  27  |  1.10    Ca    8.7      07 Dec 2022 06:50  Phos  2.8     12-  Mg     2.2     12-    TPro  6.1  /  Alb  2.3<L>  /  TBili  0.2  /  DBili  x   /  AST  14<L>  /  ALT  27  /  AlkPhos  104  12-

## 2022-12-07 NOTE — DIETITIAN INITIAL EVALUATION ADULT - IDEAL BODY WEIGHT (LBS)
120 CONSTITUTIONAL: no fever, no chills   EYES: no visual changes, no eye pain   ENMT: no nasal congestion, no throat pain  CARDIOVASCULAR: no chest pain, no edema, no palpitations   RESPIRATORY: no shortness of breath, no cough   GASTROINTESTINAL: no abdominal pain, no nausea, no vomiting, no diarrhea, no constipation   GENITOURINARY: no dysuria, no frequency  MUSCULOSKELETAL: no joint pains, no myalgias, no back pain   SKIN: no rashes  NEUROLOGICAL: no weakness, +headache, no dizziness, no slurred speech, no syncope   PSYCHIATRIC: no known mental health illness   HEME/LYMPH: no lymphadenopathy      All other ROS negative except as per HPI

## 2022-12-07 NOTE — PROGRESS NOTE ADULT - SUBJECTIVE AND OBJECTIVE BOX
[facility], Division of Infectious Diseases  JERRY Bee Y. Patel, S. Shah, G. Riki  [phone]  ([phone] - weekdays after 5pm and weekends)    Name: OMA SPRING  Age/Gender: 77y Female  MRN: [id number]    Interval History:  Patient seen this morning.   Notes reviewed.   No concerning overnight events.  Afebrile.   denies any complaints today    Allergies: penicillin (Hives)      Objective:  Vitals:   T(F): 98.4 (12-07-22 @ 05:23), Max: 98.4 (12-07-22 @ 05:23)  HR: 70 (12-07-22 @ 05:23) (68 - 70)  BP: 156/78 (12-07-22 @ 05:23) (140/68 - 156/78)  RR: 18 (12-07-22 @ 05:23) (18 - 18)  SpO2: 95% (12-07-22 @ 05:23) (93% - 95%)  Physical Examination:  General: no acute distress  HEENT: anicteric  Cardio: S1, S2, normal rate  Resp: minimal wheezes R upper lung fields  Abd: soft, NT, ND  Ext: b/l LE edema, venous stasis dermatitis  Skin: warm, dry    Laboratory Studies:  CBC:                       11.8   6.75  )-----------( 331      ( 07 Dec 2022 06:50 )             36.0     WBC Trend:  6.75 12-07-22 @ 06:50  6.54 12-06-22 @ 06:41  8.49 12-05-22 @ 05:37  10.08 12-04-22 @ 08:30    CMP: 12-07    140  |  104  |  16  ----------------------------<  105<H>  3.7   |  27  |  1.10    Ca    8.7      07 Dec 2022 06:50  Phos  2.8     12-07  Mg     2.2     12-07    TPro  6.1  /  Alb  2.3<L>  /  TBili  0.2  /  DBili  x   /  AST  14<L>  /  ALT  27  /  AlkPhos  104  12-07      LIVER FUNCTIONS - ( 07 Dec 2022 06:50 )  Alb: 2.3 g/dL / Pro: 6.1 g/dL / ALK PHOS: 104 U/L / ALT: 27 U/L / AST: 14 U/L / GGT: x               Microbiology: reviewed     Culture - Urine (collected 12-05-22 @ 08:11)  Source: Clean Catch Clean Catch (Midstream)  Final Report (12-06-22 @ 09:53):    <10,000 CFU/mL Normal Urogenital Deepthi    Culture - Blood (collected 12-04-22 @ 08:40)  Source: .Blood Blood-Peripheral  Preliminary Report (12-05-22 @ 13:02):    No growth to date.    Culture - Blood (collected 12-04-22 @ 08:30)  Source: .Blood Blood-Peripheral  Preliminary Report (12-05-22 @ 13:02):    No growth to date.        Radiology: reviewed     Medications:  acetaminophen     Tablet .. 650 milliGRAM(s) Oral every 6 hours PRN  atorvastatin 20 milliGRAM(s) Oral at bedtime  cefTRIAXone   IVPB 1000 milliGRAM(s) IV Intermittent every 24 hours  dextrose 5%. 1000 milliLiter(s) IV Continuous <Continuous>  dextrose 5%. 1000 milliLiter(s) IV Continuous <Continuous>  dextrose 50% Injectable 25 Gram(s) IV Push once  dextrose 50% Injectable 12.5 Gram(s) IV Push once  dextrose 50% Injectable 25 Gram(s) IV Push once  dextrose Oral Gel 15 Gram(s) Oral once PRN  DULoxetine 30 milliGRAM(s) Oral daily  enoxaparin Injectable 40 milliGRAM(s) SubCutaneous every 24 hours  furosemide   Injectable 40 milliGRAM(s) IV Push daily  glucagon  Injectable 1 milliGRAM(s) IntraMuscular once  influenza  Vaccine (HIGH DOSE) 0.7 milliLiter(s) IntraMuscular once  insulin lispro (ADMELOG) corrective regimen sliding scale   SubCutaneous every 6 hours  lisinopril 20 milliGRAM(s) Oral daily  metoprolol succinate ER 12.5 milliGRAM(s) Oral daily  metroNIDAZOLE  IVPB 500 milliGRAM(s) IV Intermittent every 8 hours  ondansetron Injectable 4 milliGRAM(s) IV Push every 8 hours PRN  traMADol 50 milliGRAM(s) Oral once PRN    Antimicrobials:  cefTRIAXone   IVPB 1000 milliGRAM(s) IV Intermittent every 24 hours  metroNIDAZOLE  IVPB 500 milliGRAM(s) IV Intermittent every 8 hours   [facility] Division of Infectious Diseases  JERRY Bee Y. Patel, S. Shah, G. Riki  [phone]  ([phone] - weekdays after 5pm and weekends)    Name: OMA SPRING  Age/Gender: 77y Female  MRN: [id number]    Interval History:  Patient seen this morning.   Notes reviewed.   No concerning overnight events.  Afebrile.   denies any complaints today    Allergies: penicillin (Hives)      Objective:  Vitals:   T(F): 98.4 (12-07-22 @ 05:23), Max: 98.4 (12-07-22 @ 05:23)  HR: 70 (12-07-22 @ 05:23) (68 - 70)  BP: 156/78 (12-07-22 @ 05:23) (140/68 - 156/78)  RR: 18 (12-07-22 @ 05:23) (18 - 18)  SpO2: 95% (12-07-22 @ 05:23) (93% - 95%)  Physical Examination:  General: no acute distress  HEENT: anicteric  Cardio: S1, S2, normal rate  Resp: minimal wheezes R upper lung fields  Abd: soft, NT, ND  Ext: b/l LE edema, venous stasis dermatitis  Skin: warm, dry    Laboratory Studies:  CBC:                       11.8   6.75  )-----------( 331      ( 07 Dec 2022 06:50 )             36.0     WBC Trend:  6.75 12-07-22 @ 06:50  6.54 12-06-22 @ 06:41  8.49 12-05-22 @ 05:37  10.08 12-04-22 @ 08:30    CMP: 12-07    140  |  104  |  16  ----------------------------<  105<H>  3.7   |  27  |  1.10    Ca    8.7      07 Dec 2022 06:50  Phos  2.8     12-07  Mg     2.2     12-07    TPro  6.1  /  Alb  2.3<L>  /  TBili  0.2  /  DBili  x   /  AST  14<L>  /  ALT  27  /  AlkPhos  104  12-07      LIVER FUNCTIONS - ( 07 Dec 2022 06:50 )  Alb: 2.3 g/dL / Pro: 6.1 g/dL / ALK PHOS: 104 U/L / ALT: 27 U/L / AST: 14 U/L / GGT: x               Microbiology: reviewed     Culture - Urine (collected 12-05-22 @ 08:11)  Source: Clean Catch Clean Catch (Midstream)  Final Report (12-06-22 @ 09:53):    <10,000 CFU/mL Normal Urogenital Deepthi    Culture - Blood (collected 12-04-22 @ 08:40)  Source: .Blood Blood-Peripheral  Preliminary Report (12-05-22 @ 13:02):    No growth to date.    Culture - Blood (collected 12-04-22 @ 08:30)  Source: .Blood Blood-Peripheral  Preliminary Report (12-05-22 @ 13:02):    No growth to date.        Radiology: reviewed     Medications:  acetaminophen     Tablet .. 650 milliGRAM(s) Oral every 6 hours PRN  atorvastatin 20 milliGRAM(s) Oral at bedtime  cefTRIAXone   IVPB 1000 milliGRAM(s) IV Intermittent every 24 hours  dextrose 5%. 1000 milliLiter(s) IV Continuous <Continuous>  dextrose 5%. 1000 milliLiter(s) IV Continuous <Continuous>  dextrose 50% Injectable 25 Gram(s) IV Push once  dextrose 50% Injectable 12.5 Gram(s) IV Push once  dextrose 50% Injectable 25 Gram(s) IV Push once  dextrose Oral Gel 15 Gram(s) Oral once PRN  DULoxetine 30 milliGRAM(s) Oral daily  enoxaparin Injectable 40 milliGRAM(s) SubCutaneous every 24 hours  furosemide   Injectable 40 milliGRAM(s) IV Push daily  glucagon  Injectable 1 milliGRAM(s) IntraMuscular once  influenza  Vaccine (HIGH DOSE) 0.7 milliLiter(s) IntraMuscular once  insulin lispro (ADMELOG) corrective regimen sliding scale   SubCutaneous every 6 hours  lisinopril 20 milliGRAM(s) Oral daily  metoprolol succinate ER 12.5 milliGRAM(s) Oral daily  metroNIDAZOLE  IVPB 500 milliGRAM(s) IV Intermittent every 8 hours  ondansetron Injectable 4 milliGRAM(s) IV Push every 8 hours PRN  traMADol 50 milliGRAM(s) Oral once PRN    Antimicrobials:  cefTRIAXone   IVPB 1000 milliGRAM(s) IV Intermittent every 24 hours  metroNIDAZOLE  IVPB 500 milliGRAM(s) IV Intermittent every 8 hours   [facility] Division of Infectious Diseases  JERRY Bee Y. Patel, S. Shah, G. Riki  [phone]  ([phone] - weekdays after 5pm and weekends)    Name: OMA SPRING  Age/Gender: 77y Female  MRN: [id number]    Interval History:  Patient seen this morning.   Notes reviewed.   No concerning overnight events.  Afebrile.   denies any complaints today    Allergies: penicillin (Hives)      Objective:  Vitals:   T(F): 98.4 (12-07-22 @ 05:23), Max: 98.4 (12-07-22 @ 05:23)  HR: 70 (12-07-22 @ 05:23) (68 - 70)  BP: 156/78 (12-07-22 @ 05:23) (140/68 - 156/78)  RR: 18 (12-07-22 @ 05:23) (18 - 18)  SpO2: 95% (12-07-22 @ 05:23) (93% - 95%)  Physical Examination:  General: no acute distress  HEENT: anicteric  Cardio: S1, S2, normal rate  Resp: minimal wheezes R upper lung fields  Abd: soft, NT, ND  Ext: b/l LE edema, venous stasis dermatitis  Skin: warm, dry    Laboratory Studies:  CBC:                       11.8   6.75  )-----------( 331      ( 07 Dec 2022 06:50 )             36.0     WBC Trend:  6.75 12-07-22 @ 06:50  6.54 12-06-22 @ 06:41  8.49 12-05-22 @ 05:37  10.08 12-04-22 @ 08:30    CMP: 12-07    140  |  104  |  16  ----------------------------<  105<H>  3.7   |  27  |  1.10    Ca    8.7      07 Dec 2022 06:50  Phos  2.8     12-07  Mg     2.2     12-07    TPro  6.1  /  Alb  2.3<L>  /  TBili  0.2  /  DBili  x   /  AST  14<L>  /  ALT  27  /  AlkPhos  104  12-07      LIVER FUNCTIONS - ( 07 Dec 2022 06:50 )  Alb: 2.3 g/dL / Pro: 6.1 g/dL / ALK PHOS: 104 U/L / ALT: 27 U/L / AST: 14 U/L / GGT: x               Microbiology: reviewed     Culture - Urine (collected 12-05-22 @ 08:11)  Source: Clean Catch Clean Catch (Midstream)  Final Report (12-06-22 @ 09:53):    <10,000 CFU/mL Normal Urogenital Deepthi    Culture - Blood (collected 12-04-22 @ 08:40)  Source: .Blood Blood-Peripheral  Preliminary Report (12-05-22 @ 13:02):    No growth to date.    Culture - Blood (collected 12-04-22 @ 08:30)  Source: .Blood Blood-Peripheral  Preliminary Report (12-05-22 @ 13:02):    No growth to date.        Radiology: reviewed     Medications:  acetaminophen     Tablet .. 650 milliGRAM(s) Oral every 6 hours PRN  atorvastatin 20 milliGRAM(s) Oral at bedtime  cefTRIAXone   IVPB 1000 milliGRAM(s) IV Intermittent every 24 hours  dextrose 5%. 1000 milliLiter(s) IV Continuous <Continuous>  dextrose 5%. 1000 milliLiter(s) IV Continuous <Continuous>  dextrose 50% Injectable 25 Gram(s) IV Push once  dextrose 50% Injectable 12.5 Gram(s) IV Push once  dextrose 50% Injectable 25 Gram(s) IV Push once  dextrose Oral Gel 15 Gram(s) Oral once PRN  DULoxetine 30 milliGRAM(s) Oral daily  enoxaparin Injectable 40 milliGRAM(s) SubCutaneous every 24 hours  furosemide   Injectable 40 milliGRAM(s) IV Push daily  glucagon  Injectable 1 milliGRAM(s) IntraMuscular once  influenza  Vaccine (HIGH DOSE) 0.7 milliLiter(s) IntraMuscular once  insulin lispro (ADMELOG) corrective regimen sliding scale   SubCutaneous every 6 hours  lisinopril 20 milliGRAM(s) Oral daily  metoprolol succinate ER 12.5 milliGRAM(s) Oral daily  metroNIDAZOLE  IVPB 500 milliGRAM(s) IV Intermittent every 8 hours  ondansetron Injectable 4 milliGRAM(s) IV Push every 8 hours PRN  traMADol 50 milliGRAM(s) Oral once PRN    Antimicrobials:  cefTRIAXone   IVPB 1000 milliGRAM(s) IV Intermittent every 24 hours  metroNIDAZOLE  IVPB 500 milliGRAM(s) IV Intermittent every 8 hours

## 2022-12-07 NOTE — PROGRESS NOTE ADULT - SUBJECTIVE AND OBJECTIVE BOX
Subjective: Patient see and examined. No overnight events.  MRCP was negative.    MEDICATIONS  (STANDING):  atorvastatin 20 milliGRAM(s) Oral at bedtime  cefTRIAXone   IVPB 1000 milliGRAM(s) IV Intermittent every 24 hours  dextrose 5%. 1000 milliLiter(s) (100 mL/Hr) IV Continuous <Continuous>  dextrose 5%. 1000 milliLiter(s) (50 mL/Hr) IV Continuous <Continuous>  dextrose 50% Injectable 25 Gram(s) IV Push once  dextrose 50% Injectable 12.5 Gram(s) IV Push once  dextrose 50% Injectable 25 Gram(s) IV Push once  DULoxetine 30 milliGRAM(s) Oral daily  enoxaparin Injectable 40 milliGRAM(s) SubCutaneous every 24 hours  furosemide   Injectable 40 milliGRAM(s) IV Push daily  glucagon  Injectable 1 milliGRAM(s) IntraMuscular once  influenza  Vaccine (HIGH DOSE) 0.7 milliLiter(s) IntraMuscular once  insulin lispro (ADMELOG) corrective regimen sliding scale   SubCutaneous every 6 hours  lisinopril 20 milliGRAM(s) Oral daily  metoprolol succinate ER 12.5 milliGRAM(s) Oral daily  metroNIDAZOLE  IVPB 500 milliGRAM(s) IV Intermittent every 8 hours    MEDICATIONS  (PRN):  acetaminophen     Tablet .. 650 milliGRAM(s) Oral every 6 hours PRN Temp greater or equal to 38C (100.4F), Mild Pain (1 - 3)  dextrose Oral Gel 15 Gram(s) Oral once PRN Blood Glucose LESS THAN 70 milliGRAM(s)/deciliter  ondansetron Injectable 4 milliGRAM(s) IV Push every 8 hours PRN Nausea and/or Vomiting  traMADol 50 milliGRAM(s) Oral once PRN Severe Pain (7 - 10)      Allergies    penicillin (Hives)    Intolerances        Vital Signs Last 24 Hrs  T(C): 36.8 (07 Dec 2022 11:58), Max: 36.9 (07 Dec 2022 05:23)  T(F): 98.2 (07 Dec 2022 11:58), Max: 98.4 (07 Dec 2022 05:23)  HR: 63 (07 Dec 2022 11:58) (63 - 70)  BP: 131/76 (07 Dec 2022 11:58) (131/76 - 156/78)  BP(mean): --  RR: 18 (07 Dec 2022 11:58) (18 - 18)  SpO2: 94% (07 Dec 2022 11:58) (93% - 95%)    Parameters below as of 07 Dec 2022 11:58  Patient On (Oxygen Delivery Method): room air        PHYSICAL EXAM:  GENERAL: NAD, well-groomed, well-developed  HEAD:  Atraumatic, Normocephalic  ENMT: Moist mucous membranes,   NECK: Supple, No JVD, Normal thyroid  NERVOUS SYSTEM:  All 4 extremities mobile, no gross sensory deficits.   CHEST/LUNG: Clear to auscultation bilaterally; No rales, rhonchi, wheezing, or rubs  HEART: Regular rate and rhythm; No murmurs, rubs, or gallops  ABDOMEN: Soft, Nontender, Nondistended; Bowel sounds present  EXTREMITIES:  2+ Peripheral Pulses, No clubbing, cyanosis, or edema      LABS:                        11.8   6.75  )-----------( 331      ( 07 Dec 2022 06:50 )             36.0     07 Dec 2022 06:50    140    |  104    |  16     ----------------------------<  105    3.7     |  27     |  1.10     Ca    8.7        07 Dec 2022 06:50  Phos  2.8       07 Dec 2022 06:50  Mg     2.2       07 Dec 2022 06:50    TPro  6.1    /  Alb  2.3    /  TBili  0.2    /  DBili  x      /  AST  14     /  ALT  27     /  AlkPhos  104    07 Dec 2022 06:50        CAPILLARY BLOOD GLUCOSE      POCT Blood Glucose.: 117 mg/dL (07 Dec 2022 11:38)  POCT Blood Glucose.: 93 mg/dL (07 Dec 2022 06:13)  POCT Blood Glucose.: 117 mg/dL (06 Dec 2022 23:29)  POCT Blood Glucose.: 122 mg/dL (06 Dec 2022 16:34)      RADIOLOGY & ADDITIONAL TESTS:    Imaging Personally Reviewed:  [ ] YES     Consultant(s) Notes Reviewed:      Care Discussed with Consultants/Other Providers:    Advanced Directives: [ ] DNR  [ ] No feeding tube  [ ] MOLST in chart  [ ] MOLST completed today  [ ] Unknown

## 2022-12-08 LAB
ANION GAP SERPL CALC-SCNC: 8 MMOL/L — SIGNIFICANT CHANGE UP (ref 5–17)
BUN SERPL-MCNC: 24 MG/DL — HIGH (ref 7–23)
CALCIUM SERPL-MCNC: 8.8 MG/DL — SIGNIFICANT CHANGE UP (ref 8.5–10.1)
CHLORIDE SERPL-SCNC: 104 MMOL/L — SIGNIFICANT CHANGE UP (ref 96–108)
CO2 SERPL-SCNC: 27 MMOL/L — SIGNIFICANT CHANGE UP (ref 22–31)
CREAT SERPL-MCNC: 1.3 MG/DL — SIGNIFICANT CHANGE UP (ref 0.5–1.3)
EGFR: 42 ML/MIN/1.73M2 — LOW
GLUCOSE SERPL-MCNC: 132 MG/DL — HIGH (ref 70–99)
HCT VFR BLD CALC: 38.3 % — SIGNIFICANT CHANGE UP (ref 34.5–45)
HGB BLD-MCNC: 12.5 G/DL — SIGNIFICANT CHANGE UP (ref 11.5–15.5)
MCHC RBC-ENTMCNC: 30.5 PG — SIGNIFICANT CHANGE UP (ref 27–34)
MCHC RBC-ENTMCNC: 32.6 GM/DL — SIGNIFICANT CHANGE UP (ref 32–36)
MCV RBC AUTO: 93.4 FL — SIGNIFICANT CHANGE UP (ref 80–100)
NRBC # BLD: 0 /100 WBCS — SIGNIFICANT CHANGE UP (ref 0–0)
PLATELET # BLD AUTO: 351 K/UL — SIGNIFICANT CHANGE UP (ref 150–400)
POTASSIUM SERPL-MCNC: 4.2 MMOL/L — SIGNIFICANT CHANGE UP (ref 3.5–5.3)
POTASSIUM SERPL-SCNC: 4.2 MMOL/L — SIGNIFICANT CHANGE UP (ref 3.5–5.3)
RBC # BLD: 4.1 M/UL — SIGNIFICANT CHANGE UP (ref 3.8–5.2)
RBC # FLD: 13.7 % — SIGNIFICANT CHANGE UP (ref 10.3–14.5)
SODIUM SERPL-SCNC: 139 MMOL/L — SIGNIFICANT CHANGE UP (ref 135–145)
WBC # BLD: 8.28 K/UL — SIGNIFICANT CHANGE UP (ref 3.8–10.5)
WBC # FLD AUTO: 8.28 K/UL — SIGNIFICANT CHANGE UP (ref 3.8–10.5)

## 2022-12-08 PROCEDURE — 99233 SBSQ HOSP IP/OBS HIGH 50: CPT

## 2022-12-08 PROCEDURE — 99232 SBSQ HOSP IP/OBS MODERATE 35: CPT

## 2022-12-08 RX ORDER — FUROSEMIDE 40 MG
40 TABLET ORAL DAILY
Refills: 0 | Status: DISCONTINUED | OUTPATIENT
Start: 2022-12-08 | End: 2022-12-09

## 2022-12-08 RX ADMIN — ATORVASTATIN CALCIUM 20 MILLIGRAM(S): 80 TABLET, FILM COATED ORAL at 21:34

## 2022-12-08 RX ADMIN — CEFTRIAXONE 100 MILLIGRAM(S): 500 INJECTION, POWDER, FOR SOLUTION INTRAMUSCULAR; INTRAVENOUS at 06:44

## 2022-12-08 RX ADMIN — DULOXETINE HYDROCHLORIDE 30 MILLIGRAM(S): 30 CAPSULE, DELAYED RELEASE ORAL at 11:31

## 2022-12-08 RX ADMIN — Medication 100 MILLIGRAM(S): at 21:34

## 2022-12-08 RX ADMIN — Medication 100 MILLIGRAM(S): at 05:38

## 2022-12-08 RX ADMIN — Medication 100 MILLIGRAM(S): at 13:06

## 2022-12-08 RX ADMIN — Medication 650 MILLIGRAM(S): at 11:37

## 2022-12-08 RX ADMIN — Medication 650 MILLIGRAM(S): at 12:37

## 2022-12-08 RX ADMIN — Medication 12.5 MILLIGRAM(S): at 05:41

## 2022-12-08 RX ADMIN — LISINOPRIL 20 MILLIGRAM(S): 2.5 TABLET ORAL at 05:41

## 2022-12-08 RX ADMIN — Medication 40 MILLIGRAM(S): at 05:41

## 2022-12-08 RX ADMIN — Medication 1: at 11:57

## 2022-12-08 RX ADMIN — ENOXAPARIN SODIUM 40 MILLIGRAM(S): 100 INJECTION SUBCUTANEOUS at 06:45

## 2022-12-08 NOTE — PROGRESS NOTE ADULT - ASSESSMENT
76 y/o F with no cardiac Hx, s/p cholecystectomy 1 week ago presented to the ED c/o fever and some abdominal tenderness, found to have main pulmonary enlargement and possible volume overload.  Also noted to have possible cellulitis with superimposed edema of BLE.    Volume Overload/Edema  - No known Hx of CAD or HF  - Pro-BNP is elevated at 2000 and has BLE edema with concurrent cellulitis  - Pt now on Lasix 40 mg IV daily. Can switch to PO  - Volume status improved,  - Strict I/O's and daily weights  - Technically difficult ECHO showed normal LV & RV size and function EF 60-65%, trace MR and TR, mild AS, PASP 21    - EKG with poor baseline, appears SR without sig ischemia.  - Continue statin    - BP stable and controlled, continue BB and lisinopril.    - Sepsis w/u per Primary  - Monitor and replete lytes, keep K>4, Mg>2.  - Will continue to follow.    Jada Delgadillo, MS FNP, AGAP  Nurse Practitioner- Cardiology   Spectra #[phone] 78 y/o F with no cardiac Hx, s/p cholecystectomy 1 week ago presented to the ED c/o fever and some abdominal tenderness, found to have main pulmonary enlargement and possible volume overload.  Also noted to have possible cellulitis with superimposed edema of BLE.    Volume Overload/Edema  - No known Hx of CAD or HF  - Pro-BNP is elevated at 2000 and has BLE edema with concurrent cellulitis  - Pt now on Lasix 40 mg IV daily. Can switch to PO  - Volume status improved,  - Strict I/O's and daily weights  - Technically difficult ECHO showed normal LV & RV size and function EF 60-65%, trace MR and TR, mild AS, PASP 21    - EKG with poor baseline, appears SR without sig ischemia.  - Continue statin    - BP stable and controlled, continue BB and lisinopril.    - Sepsis w/u per Primary  - Monitor and replete lytes, keep K>4, Mg>2.  - Will continue to follow.    Jada Delgadillo, MS FNP, AGAP  Nurse Practitioner- Cardiology   Spectra #[phone] 78 y/o F with no cardiac Hx, s/p cholecystectomy 1 week ago presented to the ED c/o fever and some abdominal tenderness, found to have main pulmonary enlargement and possible volume overload.  Also noted to have possible cellulitis with superimposed edema of BLE.    Volume Overload/Edema  - No known Hx of CAD or HF  - Pro-BNP is elevated at 2000 and has BLE edema with concurrent cellulitis  - Pt now on Lasix 40 mg IV daily. Can switch to PO  - Volume status improved,  - Strict I/O's and daily weights  - Technically difficult ECHO showed normal LV & RV size and function EF 60-65%, trace MR and TR, mild AS, PASP 21    - EKG with poor baseline, appears SR without sig ischemia.  - Continue statin    - BP stable and controlled, continue BB and lisinopril.    - Sepsis w/u per Primary  - Monitor and replete lytes, keep K>4, Mg>2.  - Will continue to follow.    Jada Delgadillo, MS FNP, AGAP  Nurse Practitioner- Cardiology   Spectra #[phone]

## 2022-12-08 NOTE — PROGRESS NOTE ADULT - SUBJECTIVE AND OBJECTIVE BOX
[facility] -- Nasim Wilcox Pannella, Patel, Savella, Goodger: Office # [phone]    Follow Up:  Volume Overload/Edema    Subjective/Observations: Patient seen and examined. Patient awake, alert, resting in bed. No complaints of chest pain, dyspnea, or palpitations reported. No signs of orthopnea or PND. Tolerating room air.       REVIEW OF SYSTEMS: All other review of systems are negative unless indicated above    PAST MEDICAL & SURGICAL HISTORY:  Diabetes mellitus      Hyperlipidemia      Hypertension      Hypertension      Right cataract  surgery 11/29/22      Glaucoma      S/P cholecystectomy  11/29/2022    MEDICATIONS  (STANDING):  atorvastatin 20 milliGRAM(s) Oral at bedtime  cefTRIAXone   IVPB 1000 milliGRAM(s) IV Intermittent every 24 hours  dextrose 5%. 1000 milliLiter(s) (50 mL/Hr) IV Continuous <Continuous>  dextrose 5%. 1000 milliLiter(s) (100 mL/Hr) IV Continuous <Continuous>  dextrose 50% Injectable 25 Gram(s) IV Push once  dextrose 50% Injectable 12.5 Gram(s) IV Push once  dextrose 50% Injectable 25 Gram(s) IV Push once  DULoxetine 30 milliGRAM(s) Oral daily  enoxaparin Injectable 40 milliGRAM(s) SubCutaneous every 24 hours  furosemide   Injectable 40 milliGRAM(s) IV Push daily  glucagon  Injectable 1 milliGRAM(s) IntraMuscular once  influenza  Vaccine (HIGH DOSE) 0.7 milliLiter(s) IntraMuscular once  insulin lispro (ADMELOG) corrective regimen sliding scale   SubCutaneous three times a day before meals  insulin lispro (ADMELOG) corrective regimen sliding scale   SubCutaneous at bedtime  lisinopril 20 milliGRAM(s) Oral daily  metoprolol succinate ER 12.5 milliGRAM(s) Oral daily  metroNIDAZOLE  IVPB 500 milliGRAM(s) IV Intermittent every 8 hours    MEDICATIONS  (PRN):  acetaminophen     Tablet .. 650 milliGRAM(s) Oral every 6 hours PRN Temp greater or equal to 38C (100.4F), Mild Pain (1 - 3)  dextrose Oral Gel 15 Gram(s) Oral once PRN Blood Glucose LESS THAN 70 milliGRAM(s)/deciliter  ondansetron Injectable 4 milliGRAM(s) IV Push every 8 hours PRN Nausea and/or Vomiting  traMADol 50 milliGRAM(s) Oral once PRN Severe Pain (7 - 10)    Allergies    penicillin (Hives)    Intolerances      Vital Signs Last 24 Hrs  T(C): 36.9 (08 Dec 2022 05:08), Max: 36.9 (08 Dec 2022 05:08)  T(F): 98.4 (08 Dec 2022 05:08), Max: 98.4 (08 Dec 2022 05:08)  HR: 66 (08 Dec 2022 05:08) (63 - 74)  BP: 112/72 (08 Dec 2022 05:08) (112/72 - 131/76)  BP(mean): --  RR: 18 (08 Dec 2022 05:08) (18 - 18)  SpO2: 95% (08 Dec 2022 05:08) (92% - 95%)    Parameters below as of 08 Dec 2022 05:08  Patient On (Oxygen Delivery Method): room air      I&O's Summary    07 Dec 2022 07:01  -  08 Dec 2022 07:00  --------------------------------------------------------  IN: 450 mL / OUT: 750 mL / NET: -300 mL          TELE: Not on telemetry   PHYSICAL EXAM:  Constitutional: NAD, awake and alert, Obese   HEENT: Moist Mucous Membranes, Anicteric  Pulmonary: Non-labored, breath sounds are clear bilaterally, Diminished at bases No wheezing, rales or rhonchi  Cardiovascular: Regular, S1 and S2, No murmurs, No rubs, gallops or clicks  Gastrointestinal:  soft, nontender, nondistended   Lymph: trace peripheral edema. No lymphadenopathy.   Skin: No visible rashes or ulcers.   +erythema BLE  Psych:  Mood & affect appropriate      LABS: All Labs Reviewed:                        12.5   8.28  )-----------( 351      ( 08 Dec 2022 07:00 )             38.3                         11.8   6.75  )-----------( 331      ( 07 Dec 2022 06:50 )             36.0                         11.6   6.54  )-----------( 285      ( 06 Dec 2022 06:41 )             35.4     08 Dec 2022 07:00    139    |  104    |  24     ----------------------------<  132    4.2     |  27     |  1.30   07 Dec 2022 06:50    140    |  104    |  16     ----------------------------<  105    3.7     |  27     |  1.10   06 Dec 2022 06:41    139    |  106    |  15     ----------------------------<  140    3.8     |  26     |  1.10     Ca    8.8        08 Dec 2022 07:00  Ca    8.7        07 Dec 2022 06:50  Ca    8.7        06 Dec 2022 06:41  Phos  2.8       07 Dec 2022 06:50  Mg     2.2       07 Dec 2022 06:50  Mg     2.0       06 Dec 2022 06:41    TPro  6.1    /  Alb  2.3    /  TBili  0.2    /  DBili  x      /  AST  14     /  ALT  27     /  AlkPhos  104    07 Dec 2022 06:50  TPro  6.0    /  Alb  2.2    /  TBili  0.3    /  DBili  x      /  AST  14     /  ALT  26     /  AlkPhos  103    06 Dec 2022 06:41   LIVER FUNCTIONS - ( 07 Dec 2022 06:50 )  Alb: 2.3 g/dL / Pro: 6.1 g/dL / ALK PHOS: 104 U/L / ALT: 27 U/L / AST: 14 U/L / GGT: x           Troponin I, High Sensitivity Result: 13.9 ng/L (12-04-22 @ 08:30)  Cholesterol, Serum: 154 mg/dL (12-05-22 @ 05:37)  HDL Cholesterol, Serum: 40 mg/dL (12-05-22 @ 05:37)  Triglycerides, Serum: 105 mg/dL (12-05-22 @ 05:37)    12 Lead ECG:   Ventricular Rate 68 BPM    Atrial Rate 68 BPM    P-R Interval 146 ms    QRS Duration 76 ms    Q-T Interval 410 ms    QTC Calculation(Bazett) 435 ms    P Axis 61 degrees    R Axis -5 degrees    T Axis 111 degrees    Diagnosis Line *** Poor data quality, interpretation may be adversely affected  Normal sinus rhythm  ST & T wave abnormality, consider lateral ischemia  Abnormal ECG  No previous ECGs available  Confirmed by MARLENY FONSECA ([id number]) on 12/4/2022 4:09:07 PM (12-04-22 @ 09:20)      ACC: [id number] EXAM:  ECHO TTE WO CON COMP W DOPP                          PROCEDURE DATE:  12/05/2022          INTERPRETATION:  INDICATION: Edema  Sonographer AS    Blood Pressure 135/80    Height 162 cm     Weight 90 kg       BSA 1.9 sq m    Dimensions:  LA 3.4       Normal Values: 2.0 - 4.0 cm  Ao 3.1        Normal Values: 2.0 - 3.8 cm  SEPTUM 1.1       Normal Values: 0.6 - 1.2 cm  PWT 1.1       Normal Values: 0.6 - 1.1 cm  LVIDd 3.3         Normal Values: 3.0 - 5.6 cm  LVIDs 2.2         Normal Values: 1.8 - 4.0 cm      OBSERVATIONS:  Technically difficult and limited study  Mitral Valve: Mitral annular calcification with thickened leaflets, trace   physiologic MR.  Aortic Valve/Aorta: The aortic valve is not well-visualized but appears   calcified. Peak transaortic valve gradient is 29 mmHg with a mean   transaortic valve gradient of 17 mmHg. The aortic valve area is   calculated to be 1.6 sq cm by continuity equation. This consistent with   mild aortic stenosis.  Tricuspid Valve: normal with trace TR  Pulmonic Valve: Trace PI  Left Atrium: normal  Right Atrium: Not well-visualized  Left Ventricle: normal LV size and systolic function, estimated LVEF of   60-65%.  Right Ventricle: Grossly normal size and systolic function.  Pericardium: no significant pericardial effusion.  Pulmonary/RV Pressure: estimated PA systolic pressure of 21 mmHg assuming   an RA pressure of 3 mmHg.  The IVC measures 0.9 cm        IMPRESSION:  Technically difficult and limited study  Normal left ventricular internal dimensions and systolic function,   estimated LVEF of 60-65%.  Grossly normal RV size and systolic function.  Calcified aortic valve with mild aortic stenosis, without AI.  Trace physiologic MR and TR.  No significant pericardial effusion.  --- End of Report ---  DARRON LAMA MD; Attending Cardiologist  This document has been electronically signed. Dec  6 2022  5:51PM   [facility] -- Nasim Wilcox Pannella, Patel, Savella, Goodger: Office # [phone]    Follow Up:  Volume Overload/Edema    Subjective/Observations: Patient seen and examined. Patient awake, alert, resting in bed. No complaints of chest pain, dyspnea, or palpitations reported. No signs of orthopnea or PND. Tolerating room air.       REVIEW OF SYSTEMS: All other review of systems are negative unless indicated above    PAST MEDICAL & SURGICAL HISTORY:  Diabetes mellitus      Hyperlipidemia      Hypertension      Hypertension      Right cataract  surgery 11/29/22      Glaucoma      S/P cholecystectomy  11/29/2022    MEDICATIONS  (STANDING):  atorvastatin 20 milliGRAM(s) Oral at bedtime  cefTRIAXone   IVPB 1000 milliGRAM(s) IV Intermittent every 24 hours  dextrose 5%. 1000 milliLiter(s) (50 mL/Hr) IV Continuous <Continuous>  dextrose 5%. 1000 milliLiter(s) (100 mL/Hr) IV Continuous <Continuous>  dextrose 50% Injectable 25 Gram(s) IV Push once  dextrose 50% Injectable 12.5 Gram(s) IV Push once  dextrose 50% Injectable 25 Gram(s) IV Push once  DULoxetine 30 milliGRAM(s) Oral daily  enoxaparin Injectable 40 milliGRAM(s) SubCutaneous every 24 hours  furosemide   Injectable 40 milliGRAM(s) IV Push daily  glucagon  Injectable 1 milliGRAM(s) IntraMuscular once  influenza  Vaccine (HIGH DOSE) 0.7 milliLiter(s) IntraMuscular once  insulin lispro (ADMELOG) corrective regimen sliding scale   SubCutaneous three times a day before meals  insulin lispro (ADMELOG) corrective regimen sliding scale   SubCutaneous at bedtime  lisinopril 20 milliGRAM(s) Oral daily  metoprolol succinate ER 12.5 milliGRAM(s) Oral daily  metroNIDAZOLE  IVPB 500 milliGRAM(s) IV Intermittent every 8 hours    MEDICATIONS  (PRN):  acetaminophen     Tablet .. 650 milliGRAM(s) Oral every 6 hours PRN Temp greater or equal to 38C (100.4F), Mild Pain (1 - 3)  dextrose Oral Gel 15 Gram(s) Oral once PRN Blood Glucose LESS THAN 70 milliGRAM(s)/deciliter  ondansetron Injectable 4 milliGRAM(s) IV Push every 8 hours PRN Nausea and/or Vomiting  traMADol 50 milliGRAM(s) Oral once PRN Severe Pain (7 - 10)    Allergies    penicillin (Hives)    Intolerances      Vital Signs Last 24 Hrs  T(C): 36.9 (08 Dec 2022 05:08), Max: 36.9 (08 Dec 2022 05:08)  T(F): 98.4 (08 Dec 2022 05:08), Max: 98.4 (08 Dec 2022 05:08)  HR: 66 (08 Dec 2022 05:08) (63 - 74)  BP: 112/72 (08 Dec 2022 05:08) (112/72 - 131/76)  BP(mean): --  RR: 18 (08 Dec 2022 05:08) (18 - 18)  SpO2: 95% (08 Dec 2022 05:08) (92% - 95%)    Parameters below as of 08 Dec 2022 05:08  Patient On (Oxygen Delivery Method): room air      I&O's Summary    07 Dec 2022 07:01  -  08 Dec 2022 07:00  --------------------------------------------------------  IN: 450 mL / OUT: 750 mL / NET: -300 mL          TELE: Not on telemetry   PHYSICAL EXAM:  Constitutional: NAD, awake and alert, Obese   HEENT: Moist Mucous Membranes, Anicteric  Pulmonary: Non-labored, breath sounds are clear bilaterally, Diminished at bases No wheezing, rales or rhonchi  Cardiovascular: Regular, S1 and S2, No murmurs, No rubs, gallops or clicks  Gastrointestinal:  soft, nontender, nondistended   Lymph: trace peripheral edema. No lymphadenopathy.   Skin: No visible rashes or ulcers.   +erythema BLE  Psych:  Mood & affect appropriate      LABS: All Labs Reviewed:                        12.5   8.28  )-----------( 351      ( 08 Dec 2022 07:00 )             38.3                         11.8   6.75  )-----------( 331      ( 07 Dec 2022 06:50 )             36.0                         11.6   6.54  )-----------( 285      ( 06 Dec 2022 06:41 )             35.4     08 Dec 2022 07:00    139    |  104    |  24     ----------------------------<  132    4.2     |  27     |  1.30   07 Dec 2022 06:50    140    |  104    |  16     ----------------------------<  105    3.7     |  27     |  1.10   06 Dec 2022 06:41    139    |  106    |  15     ----------------------------<  140    3.8     |  26     |  1.10     Ca    8.8        08 Dec 2022 07:00  Ca    8.7        07 Dec 2022 06:50  Ca    8.7        06 Dec 2022 06:41  Phos  2.8       07 Dec 2022 06:50  Mg     2.2       07 Dec 2022 06:50  Mg     2.0       06 Dec 2022 06:41    TPro  6.1    /  Alb  2.3    /  TBili  0.2    /  DBili  x      /  AST  14     /  ALT  27     /  AlkPhos  104    07 Dec 2022 06:50  TPro  6.0    /  Alb  2.2    /  TBili  0.3    /  DBili  x      /  AST  14     /  ALT  26     /  AlkPhos  103    06 Dec 2022 06:41   LIVER FUNCTIONS - ( 07 Dec 2022 06:50 )  Alb: 2.3 g/dL / Pro: 6.1 g/dL / ALK PHOS: 104 U/L / ALT: 27 U/L / AST: 14 U/L / GGT: x           Troponin I, High Sensitivity Result: 13.9 ng/L (12-04-22 @ 08:30)  Cholesterol, Serum: 154 mg/dL (12-05-22 @ 05:37)  HDL Cholesterol, Serum: 40 mg/dL (12-05-22 @ 05:37)  Triglycerides, Serum: 105 mg/dL (12-05-22 @ 05:37)    12 Lead ECG:   Ventricular Rate 68 BPM    Atrial Rate 68 BPM    P-R Interval 146 ms    QRS Duration 76 ms    Q-T Interval 410 ms    QTC Calculation(Bazett) 435 ms    P Axis 61 degrees    R Axis -5 degrees    T Axis 111 degrees    Diagnosis Line *** Poor data quality, interpretation may be adversely affected  Normal sinus rhythm  ST & T wave abnormality, consider lateral ischemia  Abnormal ECG  No previous ECGs available  Confirmed by MARLENY FONSECA ([id number]) on 12/4/2022 4:09:07 PM (12-04-22 @ 09:20)      ACC: [id number] EXAM:  ECHO TTE WO CON COMP W DOPP                          PROCEDURE DATE:  12/05/2022          INTERPRETATION:  INDICATION: Edema  Sonographer AS    Blood Pressure 135/80    Height 162 cm     Weight 90 kg       BSA 1.9 sq m    Dimensions:  LA 3.4       Normal Values: 2.0 - 4.0 cm  Ao 3.1        Normal Values: 2.0 - 3.8 cm  SEPTUM 1.1       Normal Values: 0.6 - 1.2 cm  PWT 1.1       Normal Values: 0.6 - 1.1 cm  LVIDd 3.3         Normal Values: 3.0 - 5.6 cm  LVIDs 2.2         Normal Values: 1.8 - 4.0 cm      OBSERVATIONS:  Technically difficult and limited study  Mitral Valve: Mitral annular calcification with thickened leaflets, trace   physiologic MR.  Aortic Valve/Aorta: The aortic valve is not well-visualized but appears   calcified. Peak transaortic valve gradient is 29 mmHg with a mean   transaortic valve gradient of 17 mmHg. The aortic valve area is   calculated to be 1.6 sq cm by continuity equation. This consistent with   mild aortic stenosis.  Tricuspid Valve: normal with trace TR  Pulmonic Valve: Trace PI  Left Atrium: normal  Right Atrium: Not well-visualized  Left Ventricle: normal LV size and systolic function, estimated LVEF of   60-65%.  Right Ventricle: Grossly normal size and systolic function.  Pericardium: no significant pericardial effusion.  Pulmonary/RV Pressure: estimated PA systolic pressure of 21 mmHg assuming   an RA pressure of 3 mmHg.  The IVC measures 0.9 cm        IMPRESSION:  Technically difficult and limited study  Normal left ventricular internal dimensions and systolic function,   estimated LVEF of 60-65%.  Grossly normal RV size and systolic function.  Calcified aortic valve with mild aortic stenosis, without AI.  Trace physiologic MR and TR.  No significant pericardial effusion.  --- End of Report ---  DARRON LAMA MD; Attending Cardiologist  This document has been electronically signed. Dec  6 2022  5:51PM   [facility] -- Nasim Wilcox Pannella, Patel, Savella, Goodger: Office # [phone]    Follow Up:  Volume Overload/Edema    Subjective/Observations: Patient seen and examined. Patient awake, alert, resting in bed. No complaints of chest pain, dyspnea, or palpitations reported. No signs of orthopnea or PND. Tolerating room air.       REVIEW OF SYSTEMS: All other review of systems are negative unless indicated above    PAST MEDICAL & SURGICAL HISTORY:  Diabetes mellitus      Hyperlipidemia      Hypertension      Hypertension      Right cataract  surgery 11/29/22      Glaucoma      S/P cholecystectomy  11/29/2022    MEDICATIONS  (STANDING):  atorvastatin 20 milliGRAM(s) Oral at bedtime  cefTRIAXone   IVPB 1000 milliGRAM(s) IV Intermittent every 24 hours  dextrose 5%. 1000 milliLiter(s) (50 mL/Hr) IV Continuous <Continuous>  dextrose 5%. 1000 milliLiter(s) (100 mL/Hr) IV Continuous <Continuous>  dextrose 50% Injectable 25 Gram(s) IV Push once  dextrose 50% Injectable 12.5 Gram(s) IV Push once  dextrose 50% Injectable 25 Gram(s) IV Push once  DULoxetine 30 milliGRAM(s) Oral daily  enoxaparin Injectable 40 milliGRAM(s) SubCutaneous every 24 hours  furosemide   Injectable 40 milliGRAM(s) IV Push daily  glucagon  Injectable 1 milliGRAM(s) IntraMuscular once  influenza  Vaccine (HIGH DOSE) 0.7 milliLiter(s) IntraMuscular once  insulin lispro (ADMELOG) corrective regimen sliding scale   SubCutaneous three times a day before meals  insulin lispro (ADMELOG) corrective regimen sliding scale   SubCutaneous at bedtime  lisinopril 20 milliGRAM(s) Oral daily  metoprolol succinate ER 12.5 milliGRAM(s) Oral daily  metroNIDAZOLE  IVPB 500 milliGRAM(s) IV Intermittent every 8 hours    MEDICATIONS  (PRN):  acetaminophen     Tablet .. 650 milliGRAM(s) Oral every 6 hours PRN Temp greater or equal to 38C (100.4F), Mild Pain (1 - 3)  dextrose Oral Gel 15 Gram(s) Oral once PRN Blood Glucose LESS THAN 70 milliGRAM(s)/deciliter  ondansetron Injectable 4 milliGRAM(s) IV Push every 8 hours PRN Nausea and/or Vomiting  traMADol 50 milliGRAM(s) Oral once PRN Severe Pain (7 - 10)    Allergies    penicillin (Hives)    Intolerances      Vital Signs Last 24 Hrs  T(C): 36.9 (08 Dec 2022 05:08), Max: 36.9 (08 Dec 2022 05:08)  T(F): 98.4 (08 Dec 2022 05:08), Max: 98.4 (08 Dec 2022 05:08)  HR: 66 (08 Dec 2022 05:08) (63 - 74)  BP: 112/72 (08 Dec 2022 05:08) (112/72 - 131/76)  BP(mean): --  RR: 18 (08 Dec 2022 05:08) (18 - 18)  SpO2: 95% (08 Dec 2022 05:08) (92% - 95%)    Parameters below as of 08 Dec 2022 05:08  Patient On (Oxygen Delivery Method): room air      I&O's Summary    07 Dec 2022 07:01  -  08 Dec 2022 07:00  --------------------------------------------------------  IN: 450 mL / OUT: 750 mL / NET: -300 mL          TELE: Not on telemetry   PHYSICAL EXAM:  Constitutional: NAD, awake and alert, Obese   HEENT: Moist Mucous Membranes, Anicteric  Pulmonary: Non-labored, breath sounds are clear bilaterally, Diminished at bases No wheezing, rales or rhonchi  Cardiovascular: Regular, S1 and S2, No murmurs, No rubs, gallops or clicks  Gastrointestinal:  soft, nontender, nondistended   Lymph: trace peripheral edema. No lymphadenopathy.   Skin: No visible rashes or ulcers.   +erythema BLE  Psych:  Mood & affect appropriate      LABS: All Labs Reviewed:                        12.5   8.28  )-----------( 351      ( 08 Dec 2022 07:00 )             38.3                         11.8   6.75  )-----------( 331      ( 07 Dec 2022 06:50 )             36.0                         11.6   6.54  )-----------( 285      ( 06 Dec 2022 06:41 )             35.4     08 Dec 2022 07:00    139    |  104    |  24     ----------------------------<  132    4.2     |  27     |  1.30   07 Dec 2022 06:50    140    |  104    |  16     ----------------------------<  105    3.7     |  27     |  1.10   06 Dec 2022 06:41    139    |  106    |  15     ----------------------------<  140    3.8     |  26     |  1.10     Ca    8.8        08 Dec 2022 07:00  Ca    8.7        07 Dec 2022 06:50  Ca    8.7        06 Dec 2022 06:41  Phos  2.8       07 Dec 2022 06:50  Mg     2.2       07 Dec 2022 06:50  Mg     2.0       06 Dec 2022 06:41    TPro  6.1    /  Alb  2.3    /  TBili  0.2    /  DBili  x      /  AST  14     /  ALT  27     /  AlkPhos  104    07 Dec 2022 06:50  TPro  6.0    /  Alb  2.2    /  TBili  0.3    /  DBili  x      /  AST  14     /  ALT  26     /  AlkPhos  103    06 Dec 2022 06:41   LIVER FUNCTIONS - ( 07 Dec 2022 06:50 )  Alb: 2.3 g/dL / Pro: 6.1 g/dL / ALK PHOS: 104 U/L / ALT: 27 U/L / AST: 14 U/L / GGT: x           Troponin I, High Sensitivity Result: 13.9 ng/L (12-04-22 @ 08:30)  Cholesterol, Serum: 154 mg/dL (12-05-22 @ 05:37)  HDL Cholesterol, Serum: 40 mg/dL (12-05-22 @ 05:37)  Triglycerides, Serum: 105 mg/dL (12-05-22 @ 05:37)    12 Lead ECG:   Ventricular Rate 68 BPM    Atrial Rate 68 BPM    P-R Interval 146 ms    QRS Duration 76 ms    Q-T Interval 410 ms    QTC Calculation(Bazett) 435 ms    P Axis 61 degrees    R Axis -5 degrees    T Axis 111 degrees    Diagnosis Line *** Poor data quality, interpretation may be adversely affected  Normal sinus rhythm  ST & T wave abnormality, consider lateral ischemia  Abnormal ECG  No previous ECGs available  Confirmed by MARLENY FONESCA ([id number]) on 12/4/2022 4:09:07 PM (12-04-22 @ 09:20)      ACC: [id number] EXAM:  ECHO TTE WO CON COMP W DOPP                          PROCEDURE DATE:  12/05/2022          INTERPRETATION:  INDICATION: Edema  Sonographer AS    Blood Pressure 135/80    Height 162 cm     Weight 90 kg       BSA 1.9 sq m    Dimensions:  LA 3.4       Normal Values: 2.0 - 4.0 cm  Ao 3.1        Normal Values: 2.0 - 3.8 cm  SEPTUM 1.1       Normal Values: 0.6 - 1.2 cm  PWT 1.1       Normal Values: 0.6 - 1.1 cm  LVIDd 3.3         Normal Values: 3.0 - 5.6 cm  LVIDs 2.2         Normal Values: 1.8 - 4.0 cm      OBSERVATIONS:  Technically difficult and limited study  Mitral Valve: Mitral annular calcification with thickened leaflets, trace   physiologic MR.  Aortic Valve/Aorta: The aortic valve is not well-visualized but appears   calcified. Peak transaortic valve gradient is 29 mmHg with a mean   transaortic valve gradient of 17 mmHg. The aortic valve area is   calculated to be 1.6 sq cm by continuity equation. This consistent with   mild aortic stenosis.  Tricuspid Valve: normal with trace TR  Pulmonic Valve: Trace PI  Left Atrium: normal  Right Atrium: Not well-visualized  Left Ventricle: normal LV size and systolic function, estimated LVEF of   60-65%.  Right Ventricle: Grossly normal size and systolic function.  Pericardium: no significant pericardial effusion.  Pulmonary/RV Pressure: estimated PA systolic pressure of 21 mmHg assuming   an RA pressure of 3 mmHg.  The IVC measures 0.9 cm        IMPRESSION:  Technically difficult and limited study  Normal left ventricular internal dimensions and systolic function,   estimated LVEF of 60-65%.  Grossly normal RV size and systolic function.  Calcified aortic valve with mild aortic stenosis, without AI.  Trace physiologic MR and TR.  No significant pericardial effusion.  --- End of Report ---  DARRON LAMA MD; Attending Cardiologist  This document has been electronically signed. Dec  6 2022  5:51PM

## 2022-12-08 NOTE — PROGRESS NOTE ADULT - SUBJECTIVE AND OBJECTIVE BOX
Subjective: Patient seen and examined. Resting in the bed and tolerating oral intake. Confused.     MEDICATIONS  (STANDING):  atorvastatin 20 milliGRAM(s) Oral at bedtime  cefTRIAXone   IVPB 1000 milliGRAM(s) IV Intermittent every 24 hours  dextrose 5%. 1000 milliLiter(s) (50 mL/Hr) IV Continuous <Continuous>  dextrose 5%. 1000 milliLiter(s) (100 mL/Hr) IV Continuous <Continuous>  dextrose 50% Injectable 25 Gram(s) IV Push once  dextrose 50% Injectable 12.5 Gram(s) IV Push once  dextrose 50% Injectable 25 Gram(s) IV Push once  DULoxetine 30 milliGRAM(s) Oral daily  enoxaparin Injectable 40 milliGRAM(s) SubCutaneous every 24 hours  furosemide    Tablet 40 milliGRAM(s) Oral daily  glucagon  Injectable 1 milliGRAM(s) IntraMuscular once  influenza  Vaccine (HIGH DOSE) 0.7 milliLiter(s) IntraMuscular once  insulin lispro (ADMELOG) corrective regimen sliding scale   SubCutaneous three times a day before meals  insulin lispro (ADMELOG) corrective regimen sliding scale   SubCutaneous at bedtime  lisinopril 20 milliGRAM(s) Oral daily  metoprolol succinate ER 12.5 milliGRAM(s) Oral daily  metroNIDAZOLE  IVPB 500 milliGRAM(s) IV Intermittent every 8 hours    MEDICATIONS  (PRN):  acetaminophen     Tablet .. 650 milliGRAM(s) Oral every 6 hours PRN Temp greater or equal to 38C (100.4F), Mild Pain (1 - 3)  dextrose Oral Gel 15 Gram(s) Oral once PRN Blood Glucose LESS THAN 70 milliGRAM(s)/deciliter  ondansetron Injectable 4 milliGRAM(s) IV Push every 8 hours PRN Nausea and/or Vomiting  traMADol 50 milliGRAM(s) Oral once PRN Severe Pain (7 - 10)      Allergies    penicillin (Hives)    Intolerances        Vital Signs Last 24 Hrs  T(C): 36.9 (08 Dec 2022 05:08), Max: 36.9 (08 Dec 2022 05:08)  T(F): 98.4 (08 Dec 2022 05:08), Max: 98.4 (08 Dec 2022 05:08)  HR: 66 (08 Dec 2022 05:08) (63 - 74)  BP: 112/72 (08 Dec 2022 05:08) (112/72 - 131/76)  BP(mean): --  RR: 18 (08 Dec 2022 05:08) (18 - 18)  SpO2: 95% (08 Dec 2022 05:08) (92% - 95%)    Parameters below as of 08 Dec 2022 05:08  Patient On (Oxygen Delivery Method): room air        PHYSICAL EXAM:  GENERAL: NAD, well-groomed, well-developed  HEAD:  Atraumatic, Normocephalic  ENMT: Moist mucous membranes,   NECK: Supple, No JVD, Normal thyroid  NERVOUS SYSTEM:  All 4 extremities mobile, no gross sensory deficits.   CHEST/LUNG: Clear to auscultation bilaterally; No rales, rhonchi, wheezing, or rubs  HEART: Regular rate and rhythm; No murmurs, rubs, or gallops  ABDOMEN: Soft, Nontender, Nondistended; Bowel sounds present  EXTREMITIES:  2+ Peripheral Pulses, No clubbing, cyanosis, or edema      LABS:                        12.5   8.28  )-----------( 351      ( 08 Dec 2022 07:00 )             38.3     08 Dec 2022 07:00    139    |  104    |  24     ----------------------------<  132    4.2     |  27     |  1.30     Ca    8.8        08 Dec 2022 07:00          CAPILLARY BLOOD GLUCOSE      POCT Blood Glucose.: 128 mg/dL (08 Dec 2022 07:39)  POCT Blood Glucose.: 106 mg/dL (07 Dec 2022 22:02)  POCT Blood Glucose.: 142 mg/dL (07 Dec 2022 16:50)  POCT Blood Glucose.: 117 mg/dL (07 Dec 2022 11:38)      RADIOLOGY & ADDITIONAL TESTS:    Imaging Personally Reviewed:  [ ] YES     Consultant(s) Notes Reviewed:      Care Discussed with Consultants/Other Providers:    Advanced Directives: [ ] DNR  [ ] No feeding tube  [ ] MOLST in chart  [ ] MOLST completed today  [ ] Unknown

## 2022-12-08 NOTE — PROGRESS NOTE ADULT - ASSESSMENT
78 y/o F PMHx of dementia, HTN, HLD s/p cholecystectomy 1 week ago who presented w/ SOB and abd discomfort.     SOB- resolved  abd pain- resolved  US LE neg for DVT  s/p CTA- 1. No pulmonary embolus. Elevated left hemidiaphragm and partial left lower lobe atelectasis. Superimposed infection cannot be excluded  s/p CT abd/pelvis- cholecystectomy. No loculated fluid collection along the gallbladder fossa. Mild central biliary distention   s/p MRCP- Status post cholecystectomy with mild central intrahepatic biliary ductal dilatation, periductal enhancement and edema/ hyperenhancement in the gallbladder fossa; No choledocholithiasis.  s/p TTE  f/u blood cx- NGTD  low suspicion for PNA but will plan to complete empiric 5 day course of antibiotics for PNA and for intra-abdominal coverage  c/w ceftriaxone/ flagyl to complete empiric 5 day course of antibiotics, last day today, already received dose of ceftriaxone today  discharge planning    cellulitis- resolved  daughter reports LLE shin w/ bright red erythema which has improved since starting antibiotics  no erythema present at this time  c/w ceftriaxone as above    edema  diuresis per cards    Rl Lyn M.D.  [facility], Division of Infectious Diseases  [phone]  After 5pm on weekdays and all day on weekends - please call [phone] 78 y/o F PMHx of dementia, HTN, HLD s/p cholecystectomy 1 week ago who presented w/ SOB and abd discomfort.     SOB- resolved  abd pain- resolved  US LE neg for DVT  s/p CTA- 1. No pulmonary embolus. Elevated left hemidiaphragm and partial left lower lobe atelectasis. Superimposed infection cannot be excluded  s/p CT abd/pelvis- cholecystectomy. No loculated fluid collection along the gallbladder fossa. Mild central biliary distention   s/p MRCP- Status post cholecystectomy with mild central intrahepatic biliary ductal dilatation, periductal enhancement and edema/ hyperenhancement in the gallbladder fossa; No choledocholithiasis.  s/p TTE  f/u blood cx- NGTD  low suspicion for PNA but will plan to complete empiric 5 day course of antibiotics for PNA and for intra-abdominal coverage  c/w ceftriaxone/ flagyl to complete empiric 5 day course of antibiotics, last day today, already received dose of ceftriaxone today  discharge planning    cellulitis- resolved  daughter reports LLE shin w/ bright red erythema which has improved since starting antibiotics  no erythema present at this time  c/w ceftriaxone as above    edema  diuresis per cards    Rl Lyn M.D.  [facility], Division of Infectious Diseases  [phone]  After 5pm on weekdays and all day on weekends - please call [phone] 76 y/o F PMHx of dementia, HTN, HLD s/p cholecystectomy 1 week ago who presented w/ SOB and abd discomfort.     SOB- resolved  abd pain- resolved  US LE neg for DVT  s/p CTA- 1. No pulmonary embolus. Elevated left hemidiaphragm and partial left lower lobe atelectasis. Superimposed infection cannot be excluded  s/p CT abd/pelvis- cholecystectomy. No loculated fluid collection along the gallbladder fossa. Mild central biliary distention   s/p MRCP- Status post cholecystectomy with mild central intrahepatic biliary ductal dilatation, periductal enhancement and edema/ hyperenhancement in the gallbladder fossa; No choledocholithiasis.  s/p TTE  f/u blood cx- NGTD  low suspicion for PNA but will plan to complete empiric 5 day course of antibiotics for PNA and for intra-abdominal coverage  c/w ceftriaxone/ flagyl to complete empiric 5 day course of antibiotics, last day today, already received dose of ceftriaxone today  discharge planning    cellulitis- resolved  daughter reports LLE shin w/ bright red erythema which has improved since starting antibiotics  no erythema present at this time  c/w ceftriaxone as above    edema  diuresis per cards    Rl Lyn M.D.  [facility], Division of Infectious Diseases  [phone]  After 5pm on weekdays and all day on weekends - please call [phone]

## 2022-12-08 NOTE — PROGRESS NOTE ADULT - ASSESSMENT
78 y/o F PMHx of dementia, T2DM, HTN, HLD, s/p cholecystectomy 1 week ago presented to the ED by ambulance for fever at home, abd discomfort/pain, and cough since last night 12/3 with CT showing mild central biliary distention admitted for cellulitis, PNA, possible choledocholithiasis s/p lap cholecystectomy and apparent volume overload.

## 2022-12-08 NOTE — PROGRESS NOTE ADULT - SUBJECTIVE AND OBJECTIVE BOX
[facility], Division of Infectious Diseases  JERRY Bee Y. Patel, S. Shah, G. Riki  [phone]  ([phone] - weekdays after 5pm and weekends)    Name: OMA SPRING  Age/Gender: 77y Female  MRN: [id number]    Interval History:  Patient seen this morning.   Notes reviewed.   No concerning overnight events.  Afebrile.   denies abd pain or dysuria  denies SOB    Allergies: penicillin (Hives)      Objective:  Vitals:   T(F): 98 (12-08-22 @ 11:26), Max: 98.4 (12-08-22 @ 05:08)  HR: 82 (12-08-22 @ 11:26) (63 - 82)  BP: 102/68 (12-08-22 @ 11:26) (102/68 - 131/76)  RR: 18 (12-08-22 @ 11:26) (18 - 18)  SpO2: 93% (12-08-22 @ 11:26) (92% - 95%)  Physical Examination:  General: no acute distress  HEENT: anicteric  Cardio: S1, S2, normal rate  Resp: breathing comfortably on RA  Abd: soft, NT, ND  Ext: b/l LE edema, venous stasis dermatitis  Skin: warm, dry    Laboratory Studies:  CBC:                       12.5   8.28  )-----------( 351      ( 08 Dec 2022 07:00 )             38.3     WBC Trend:  8.28 12-08-22 @ 07:00  6.75 12-07-22 @ 06:50  6.54 12-06-22 @ 06:41  8.49 12-05-22 @ 05:37  10.08 12-04-22 @ 08:30    CMP: 12-08    139  |  104  |  24<H>  ----------------------------<  132<H>  4.2   |  27  |  1.30    Ca    8.8      08 Dec 2022 07:00  Phos  2.8     12-07  Mg     2.2     12-07    TPro  6.1  /  Alb  2.3<L>  /  TBili  0.2  /  DBili  x   /  AST  14<L>  /  ALT  27  /  AlkPhos  104  12-07      LIVER FUNCTIONS - ( 07 Dec 2022 06:50 )  Alb: 2.3 g/dL / Pro: 6.1 g/dL / ALK PHOS: 104 U/L / ALT: 27 U/L / AST: 14 U/L / GGT: x               Microbiology: reviewed     Culture - Urine (collected 12-05-22 @ 08:11)  Source: Clean Catch Clean Catch (Midstream)  Final Report (12-06-22 @ 09:53):    <10,000 CFU/mL Normal Urogenital Deepthi    Culture - Blood (collected 12-04-22 @ 08:40)  Source: .Blood Blood-Peripheral  Preliminary Report (12-05-22 @ 13:02):    No growth to date.    Culture - Blood (collected 12-04-22 @ 08:30)  Source: .Blood Blood-Peripheral  Preliminary Report (12-05-22 @ 13:02):    No growth to date.        Radiology: reviewed     Medications:  acetaminophen     Tablet .. 650 milliGRAM(s) Oral every 6 hours PRN  atorvastatin 20 milliGRAM(s) Oral at bedtime  cefTRIAXone   IVPB 1000 milliGRAM(s) IV Intermittent every 24 hours  dextrose 5%. 1000 milliLiter(s) IV Continuous <Continuous>  dextrose 5%. 1000 milliLiter(s) IV Continuous <Continuous>  dextrose 50% Injectable 25 Gram(s) IV Push once  dextrose 50% Injectable 12.5 Gram(s) IV Push once  dextrose 50% Injectable 25 Gram(s) IV Push once  dextrose Oral Gel 15 Gram(s) Oral once PRN  DULoxetine 30 milliGRAM(s) Oral daily  enoxaparin Injectable 40 milliGRAM(s) SubCutaneous every 24 hours  furosemide    Tablet 40 milliGRAM(s) Oral daily  glucagon  Injectable 1 milliGRAM(s) IntraMuscular once  influenza  Vaccine (HIGH DOSE) 0.7 milliLiter(s) IntraMuscular once  insulin lispro (ADMELOG) corrective regimen sliding scale   SubCutaneous three times a day before meals  insulin lispro (ADMELOG) corrective regimen sliding scale   SubCutaneous at bedtime  lisinopril 20 milliGRAM(s) Oral daily  metoprolol succinate ER 12.5 milliGRAM(s) Oral daily  metroNIDAZOLE  IVPB 500 milliGRAM(s) IV Intermittent every 8 hours  ondansetron Injectable 4 milliGRAM(s) IV Push every 8 hours PRN  traMADol 50 milliGRAM(s) Oral once PRN    Antimicrobials:  cefTRIAXone   IVPB 1000 milliGRAM(s) IV Intermittent every 24 hours  metroNIDAZOLE  IVPB 500 milliGRAM(s) IV Intermittent every 8 hours   [facility], Division of Infectious Diseases  JERRY Bee Y. Patel, S. Shah, G. Riki  [phone]  ([phone] - weekdays after 5pm and weekends)    Name: OMA SPRING  Age/Gender: 77y Female  MRN: [id number]    Interval History:  Patient seen this morning.   Notes reviewed.   No concerning overnight events.  Afebrile.   denies abd pain or dysuria  denies SOB    Allergies: penicillin (Hives)      Objective:  Vitals:   T(F): 98 (12-08-22 @ 11:26), Max: 98.4 (12-08-22 @ 05:08)  HR: 82 (12-08-22 @ 11:26) (63 - 82)  BP: 102/68 (12-08-22 @ 11:26) (102/68 - 131/76)  RR: 18 (12-08-22 @ 11:26) (18 - 18)  SpO2: 93% (12-08-22 @ 11:26) (92% - 95%)  Physical Examination:  General: no acute distress  HEENT: anicteric  Cardio: S1, S2, normal rate  Resp: breathing comfortably on RA  Abd: soft, NT, ND  Ext: b/l LE edema, venous stasis dermatitis  Skin: warm, dry    Laboratory Studies:  CBC:                       12.5   8.28  )-----------( 351      ( 08 Dec 2022 07:00 )             38.3     WBC Trend:  8.28 12-08-22 @ 07:00  6.75 12-07-22 @ 06:50  6.54 12-06-22 @ 06:41  8.49 12-05-22 @ 05:37  10.08 12-04-22 @ 08:30    CMP: 12-08    139  |  104  |  24<H>  ----------------------------<  132<H>  4.2   |  27  |  1.30    Ca    8.8      08 Dec 2022 07:00  Phos  2.8     12-07  Mg     2.2     12-07    TPro  6.1  /  Alb  2.3<L>  /  TBili  0.2  /  DBili  x   /  AST  14<L>  /  ALT  27  /  AlkPhos  104  12-07      LIVER FUNCTIONS - ( 07 Dec 2022 06:50 )  Alb: 2.3 g/dL / Pro: 6.1 g/dL / ALK PHOS: 104 U/L / ALT: 27 U/L / AST: 14 U/L / GGT: x               Microbiology: reviewed     Culture - Urine (collected 12-05-22 @ 08:11)  Source: Clean Catch Clean Catch (Midstream)  Final Report (12-06-22 @ 09:53):    <10,000 CFU/mL Normal Urogenital Deepthi    Culture - Blood (collected 12-04-22 @ 08:40)  Source: .Blood Blood-Peripheral  Preliminary Report (12-05-22 @ 13:02):    No growth to date.    Culture - Blood (collected 12-04-22 @ 08:30)  Source: .Blood Blood-Peripheral  Preliminary Report (12-05-22 @ 13:02):    No growth to date.        Radiology: reviewed     Medications:  acetaminophen     Tablet .. 650 milliGRAM(s) Oral every 6 hours PRN  atorvastatin 20 milliGRAM(s) Oral at bedtime  cefTRIAXone   IVPB 1000 milliGRAM(s) IV Intermittent every 24 hours  dextrose 5%. 1000 milliLiter(s) IV Continuous <Continuous>  dextrose 5%. 1000 milliLiter(s) IV Continuous <Continuous>  dextrose 50% Injectable 25 Gram(s) IV Push once  dextrose 50% Injectable 12.5 Gram(s) IV Push once  dextrose 50% Injectable 25 Gram(s) IV Push once  dextrose Oral Gel 15 Gram(s) Oral once PRN  DULoxetine 30 milliGRAM(s) Oral daily  enoxaparin Injectable 40 milliGRAM(s) SubCutaneous every 24 hours  furosemide    Tablet 40 milliGRAM(s) Oral daily  glucagon  Injectable 1 milliGRAM(s) IntraMuscular once  influenza  Vaccine (HIGH DOSE) 0.7 milliLiter(s) IntraMuscular once  insulin lispro (ADMELOG) corrective regimen sliding scale   SubCutaneous three times a day before meals  insulin lispro (ADMELOG) corrective regimen sliding scale   SubCutaneous at bedtime  lisinopril 20 milliGRAM(s) Oral daily  metoprolol succinate ER 12.5 milliGRAM(s) Oral daily  metroNIDAZOLE  IVPB 500 milliGRAM(s) IV Intermittent every 8 hours  ondansetron Injectable 4 milliGRAM(s) IV Push every 8 hours PRN  traMADol 50 milliGRAM(s) Oral once PRN    Antimicrobials:  cefTRIAXone   IVPB 1000 milliGRAM(s) IV Intermittent every 24 hours  metroNIDAZOLE  IVPB 500 milliGRAM(s) IV Intermittent every 8 hours   [facility], Division of Infectious Diseases  JERRY Bee Y. Patel, S. Shah, G. Riki  [phone]  ([phone] - weekdays after 5pm and weekends)    Name: OMA SPRING  Age/Gender: 77y Female  MRN: [id number]    Interval History:  Patient seen this morning.   Notes reviewed.   No concerning overnight events.  Afebrile.   denies abd pain or dysuria  denies SOB    Allergies: penicillin (Hives)      Objective:  Vitals:   T(F): 98 (12-08-22 @ 11:26), Max: 98.4 (12-08-22 @ 05:08)  HR: 82 (12-08-22 @ 11:26) (63 - 82)  BP: 102/68 (12-08-22 @ 11:26) (102/68 - 131/76)  RR: 18 (12-08-22 @ 11:26) (18 - 18)  SpO2: 93% (12-08-22 @ 11:26) (92% - 95%)  Physical Examination:  General: no acute distress  HEENT: anicteric  Cardio: S1, S2, normal rate  Resp: breathing comfortably on RA  Abd: soft, NT, ND  Ext: b/l LE edema, venous stasis dermatitis  Skin: warm, dry    Laboratory Studies:  CBC:                       12.5   8.28  )-----------( 351      ( 08 Dec 2022 07:00 )             38.3     WBC Trend:  8.28 12-08-22 @ 07:00  6.75 12-07-22 @ 06:50  6.54 12-06-22 @ 06:41  8.49 12-05-22 @ 05:37  10.08 12-04-22 @ 08:30    CMP: 12-08    139  |  104  |  24<H>  ----------------------------<  132<H>  4.2   |  27  |  1.30    Ca    8.8      08 Dec 2022 07:00  Phos  2.8     12-07  Mg     2.2     12-07    TPro  6.1  /  Alb  2.3<L>  /  TBili  0.2  /  DBili  x   /  AST  14<L>  /  ALT  27  /  AlkPhos  104  12-07      LIVER FUNCTIONS - ( 07 Dec 2022 06:50 )  Alb: 2.3 g/dL / Pro: 6.1 g/dL / ALK PHOS: 104 U/L / ALT: 27 U/L / AST: 14 U/L / GGT: x               Microbiology: reviewed     Culture - Urine (collected 12-05-22 @ 08:11)  Source: Clean Catch Clean Catch (Midstream)  Final Report (12-06-22 @ 09:53):    <10,000 CFU/mL Normal Urogenital Deepthi    Culture - Blood (collected 12-04-22 @ 08:40)  Source: .Blood Blood-Peripheral  Preliminary Report (12-05-22 @ 13:02):    No growth to date.    Culture - Blood (collected 12-04-22 @ 08:30)  Source: .Blood Blood-Peripheral  Preliminary Report (12-05-22 @ 13:02):    No growth to date.        Radiology: reviewed     Medications:  acetaminophen     Tablet .. 650 milliGRAM(s) Oral every 6 hours PRN  atorvastatin 20 milliGRAM(s) Oral at bedtime  cefTRIAXone   IVPB 1000 milliGRAM(s) IV Intermittent every 24 hours  dextrose 5%. 1000 milliLiter(s) IV Continuous <Continuous>  dextrose 5%. 1000 milliLiter(s) IV Continuous <Continuous>  dextrose 50% Injectable 25 Gram(s) IV Push once  dextrose 50% Injectable 12.5 Gram(s) IV Push once  dextrose 50% Injectable 25 Gram(s) IV Push once  dextrose Oral Gel 15 Gram(s) Oral once PRN  DULoxetine 30 milliGRAM(s) Oral daily  enoxaparin Injectable 40 milliGRAM(s) SubCutaneous every 24 hours  furosemide    Tablet 40 milliGRAM(s) Oral daily  glucagon  Injectable 1 milliGRAM(s) IntraMuscular once  influenza  Vaccine (HIGH DOSE) 0.7 milliLiter(s) IntraMuscular once  insulin lispro (ADMELOG) corrective regimen sliding scale   SubCutaneous three times a day before meals  insulin lispro (ADMELOG) corrective regimen sliding scale   SubCutaneous at bedtime  lisinopril 20 milliGRAM(s) Oral daily  metoprolol succinate ER 12.5 milliGRAM(s) Oral daily  metroNIDAZOLE  IVPB 500 milliGRAM(s) IV Intermittent every 8 hours  ondansetron Injectable 4 milliGRAM(s) IV Push every 8 hours PRN  traMADol 50 milliGRAM(s) Oral once PRN    Antimicrobials:  cefTRIAXone   IVPB 1000 milliGRAM(s) IV Intermittent every 24 hours  metroNIDAZOLE  IVPB 500 milliGRAM(s) IV Intermittent every 8 hours

## 2022-12-08 NOTE — PROGRESS NOTE ADULT - ASSESSMENT
cbd dilation  inc lft  sepsis    LFTs do not suggest biliary obstruction; cont to trend  MRCP noted; no CBD stone  Pain control  ID following, cont abx as per recs  D/w pt   No objection to begin d/c plan    I reviewed the overnight course of events on the unit, re-confirming the patient history. I discussed the care with the patient and their family  Differential diagnosis and plan of care discussed with patient after the evaluation  35 minutes spent on total encounter of which more than fifty percent of the encounter was spent counseling and/or coordinating care by the attending physician.  Advanced care planning was discussed with patient and family.  Advanced care planning forms were reviewed and discussed.  Risks, benefits and alternatives of gastroenterologic procedures were discussed in detail and all questions were answered.

## 2022-12-08 NOTE — PROGRESS NOTE ADULT - SUBJECTIVE AND OBJECTIVE BOX
[facility]  John Marcelino PA-C  [address] [place] [address]  [phone]      INTERVAL HPI/OVERNIGHT EVENTS:  Pt s/e  Reports no new GI events    MEDICATIONS  (STANDING):  atorvastatin 20 milliGRAM(s) Oral at bedtime  cefTRIAXone   IVPB 1000 milliGRAM(s) IV Intermittent every 24 hours  dextrose 5%. 1000 milliLiter(s) (50 mL/Hr) IV Continuous <Continuous>  dextrose 5%. 1000 milliLiter(s) (100 mL/Hr) IV Continuous <Continuous>  dextrose 50% Injectable 25 Gram(s) IV Push once  dextrose 50% Injectable 12.5 Gram(s) IV Push once  dextrose 50% Injectable 25 Gram(s) IV Push once  DULoxetine 30 milliGRAM(s) Oral daily  enoxaparin Injectable 40 milliGRAM(s) SubCutaneous every 24 hours  furosemide    Tablet 40 milliGRAM(s) Oral daily  glucagon  Injectable 1 milliGRAM(s) IntraMuscular once  influenza  Vaccine (HIGH DOSE) 0.7 milliLiter(s) IntraMuscular once  insulin lispro (ADMELOG) corrective regimen sliding scale   SubCutaneous three times a day before meals  insulin lispro (ADMELOG) corrective regimen sliding scale   SubCutaneous at bedtime  lisinopril 20 milliGRAM(s) Oral daily  metoprolol succinate ER 12.5 milliGRAM(s) Oral daily  metroNIDAZOLE  IVPB 500 milliGRAM(s) IV Intermittent every 8 hours    MEDICATIONS  (PRN):  acetaminophen     Tablet .. 650 milliGRAM(s) Oral every 6 hours PRN Temp greater or equal to 38C (100.4F), Mild Pain (1 - 3)  dextrose Oral Gel 15 Gram(s) Oral once PRN Blood Glucose LESS THAN 70 milliGRAM(s)/deciliter  ondansetron Injectable 4 milliGRAM(s) IV Push every 8 hours PRN Nausea and/or Vomiting  traMADol 50 milliGRAM(s) Oral once PRN Severe Pain (7 - 10)      Allergies    penicillin (Hives)      PHYSICAL EXAM:   Vital Signs:  Vital Signs Last 24 Hrs  T(C): 36.9 (08 Dec 2022 05:08), Max: 36.9 (08 Dec 2022 05:08)  T(F): 98.4 (08 Dec 2022 05:08), Max: 98.4 (08 Dec 2022 05:08)  HR: 66 (08 Dec 2022 05:08) (63 - 74)  BP: 112/72 (08 Dec 2022 05:08) (112/72 - 131/76)  BP(mean): --  RR: 18 (08 Dec 2022 05:08) (18 - 18)  SpO2: 95% (08 Dec 2022 05:08) (92% - 95%)    Parameters below as of 08 Dec 2022 05:08  Patient On (Oxygen Delivery Method): room air    GENERAL:  Appears stated age  ABDOMEN:  Soft, non-tender, non-distended  NEURO:  Alert      LABS:                        12.5   8.28  )-----------( 351      ( 08 Dec 2022 07:00 )             38.3     12-08    139  |  104  |  24<H>  ----------------------------<  132<H>  4.2   |  27  |  1.30    Ca    8.8      08 Dec 2022 07:00  Phos  2.8     12-07  Mg     2.2     12-07    TPro  6.1  /  Alb  2.3<L>  /  TBili  0.2  /  DBili  x   /  AST  14<L>  /  ALT  27  /  AlkPhos  104  12-07   [facility]  John Marcelino PA-C  [address] [place] [address]  [phone]      INTERVAL HPI/OVERNIGHT EVENTS:  Pt s/e  Reports no new GI events    MEDICATIONS  (STANDING):  atorvastatin 20 milliGRAM(s) Oral at bedtime  cefTRIAXone   IVPB 1000 milliGRAM(s) IV Intermittent every 24 hours  dextrose 5%. 1000 milliLiter(s) (50 mL/Hr) IV Continuous <Continuous>  dextrose 5%. 1000 milliLiter(s) (100 mL/Hr) IV Continuous <Continuous>  dextrose 50% Injectable 25 Gram(s) IV Push once  dextrose 50% Injectable 12.5 Gram(s) IV Push once  dextrose 50% Injectable 25 Gram(s) IV Push once  DULoxetine 30 milliGRAM(s) Oral daily  enoxaparin Injectable 40 milliGRAM(s) SubCutaneous every 24 hours  furosemide    Tablet 40 milliGRAM(s) Oral daily  glucagon  Injectable 1 milliGRAM(s) IntraMuscular once  influenza  Vaccine (HIGH DOSE) 0.7 milliLiter(s) IntraMuscular once  insulin lispro (ADMELOG) corrective regimen sliding scale   SubCutaneous three times a day before meals  insulin lispro (ADMELOG) corrective regimen sliding scale   SubCutaneous at bedtime  lisinopril 20 milliGRAM(s) Oral daily  metoprolol succinate ER 12.5 milliGRAM(s) Oral daily  metroNIDAZOLE  IVPB 500 milliGRAM(s) IV Intermittent every 8 hours    MEDICATIONS  (PRN):  acetaminophen     Tablet .. 650 milliGRAM(s) Oral every 6 hours PRN Temp greater or equal to 38C (100.4F), Mild Pain (1 - 3)  dextrose Oral Gel 15 Gram(s) Oral once PRN Blood Glucose LESS THAN 70 milliGRAM(s)/deciliter  ondansetron Injectable 4 milliGRAM(s) IV Push every 8 hours PRN Nausea and/or Vomiting  traMADol 50 milliGRAM(s) Oral once PRN Severe Pain (7 - 10)      Allergies    penicillin (Hives)      PHYSICAL EXAM:   Vital Signs:  Vital Signs Last 24 Hrs  T(C): 36.9 (08 Dec 2022 05:08), Max: 36.9 (08 Dec 2022 05:08)  T(F): 98.4 (08 Dec 2022 05:08), Max: 98.4 (08 Dec 2022 05:08)  HR: 66 (08 Dec 2022 05:08) (63 - 74)  BP: 112/72 (08 Dec 2022 05:08) (112/72 - 131/76)  BP(mean): --  RR: 18 (08 Dec 2022 05:08) (18 - 18)  SpO2: 95% (08 Dec 2022 05:08) (92% - 95%)    Parameters below as of 08 Dec 2022 05:08  Patient On (Oxygen Delivery Method): room air    GENERAL:  Appears stated age  ABDOMEN:  Soft, non-tender, non-distended  NEURO:  Alert      LABS:                        12.5   8.28  )-----------( 351      ( 08 Dec 2022 07:00 )             38.3     12-08    139  |  104  |  24<H>  ----------------------------<  132<H>  4.2   |  27  |  1.30    Ca    8.8      08 Dec 2022 07:00  Phos  2.8     12-07  Mg     2.2     12-07    TPro  6.1  /  Alb  2.3<L>  /  TBili  0.2  /  DBili  x   /  AST  14<L>  /  ALT  27  /  AlkPhos  104  12-07   [facility]  John Marcelino PA-C  [address] [place] [address]  [phone]      INTERVAL HPI/OVERNIGHT EVENTS:  Pt s/e  Reports no new GI events    MEDICATIONS  (STANDING):  atorvastatin 20 milliGRAM(s) Oral at bedtime  cefTRIAXone   IVPB 1000 milliGRAM(s) IV Intermittent every 24 hours  dextrose 5%. 1000 milliLiter(s) (50 mL/Hr) IV Continuous <Continuous>  dextrose 5%. 1000 milliLiter(s) (100 mL/Hr) IV Continuous <Continuous>  dextrose 50% Injectable 25 Gram(s) IV Push once  dextrose 50% Injectable 12.5 Gram(s) IV Push once  dextrose 50% Injectable 25 Gram(s) IV Push once  DULoxetine 30 milliGRAM(s) Oral daily  enoxaparin Injectable 40 milliGRAM(s) SubCutaneous every 24 hours  furosemide    Tablet 40 milliGRAM(s) Oral daily  glucagon  Injectable 1 milliGRAM(s) IntraMuscular once  influenza  Vaccine (HIGH DOSE) 0.7 milliLiter(s) IntraMuscular once  insulin lispro (ADMELOG) corrective regimen sliding scale   SubCutaneous three times a day before meals  insulin lispro (ADMELOG) corrective regimen sliding scale   SubCutaneous at bedtime  lisinopril 20 milliGRAM(s) Oral daily  metoprolol succinate ER 12.5 milliGRAM(s) Oral daily  metroNIDAZOLE  IVPB 500 milliGRAM(s) IV Intermittent every 8 hours    MEDICATIONS  (PRN):  acetaminophen     Tablet .. 650 milliGRAM(s) Oral every 6 hours PRN Temp greater or equal to 38C (100.4F), Mild Pain (1 - 3)  dextrose Oral Gel 15 Gram(s) Oral once PRN Blood Glucose LESS THAN 70 milliGRAM(s)/deciliter  ondansetron Injectable 4 milliGRAM(s) IV Push every 8 hours PRN Nausea and/or Vomiting  traMADol 50 milliGRAM(s) Oral once PRN Severe Pain (7 - 10)      Allergies    penicillin (Hives)      PHYSICAL EXAM:   Vital Signs:  Vital Signs Last 24 Hrs  T(C): 36.9 (08 Dec 2022 05:08), Max: 36.9 (08 Dec 2022 05:08)  T(F): 98.4 (08 Dec 2022 05:08), Max: 98.4 (08 Dec 2022 05:08)  HR: 66 (08 Dec 2022 05:08) (63 - 74)  BP: 112/72 (08 Dec 2022 05:08) (112/72 - 131/76)  BP(mean): --  RR: 18 (08 Dec 2022 05:08) (18 - 18)  SpO2: 95% (08 Dec 2022 05:08) (92% - 95%)    Parameters below as of 08 Dec 2022 05:08  Patient On (Oxygen Delivery Method): room air    GENERAL:  Appears stated age  ABDOMEN:  Soft, non-tender, non-distended  NEURO:  Alert      LABS:                        12.5   8.28  )-----------( 351      ( 08 Dec 2022 07:00 )             38.3     12-08    139  |  104  |  24<H>  ----------------------------<  132<H>  4.2   |  27  |  1.30    Ca    8.8      08 Dec 2022 07:00  Phos  2.8     12-07  Mg     2.2     12-07    TPro  6.1  /  Alb  2.3<L>  /  TBili  0.2  /  DBili  x   /  AST  14<L>  /  ALT  27  /  AlkPhos  104  12-07

## 2022-12-09 ENCOUNTER — TRANSCRIPTION ENCOUNTER (OUTPATIENT)
Age: 77
End: 2022-12-09

## 2022-12-09 VITALS
RESPIRATION RATE: 18 BRPM | DIASTOLIC BLOOD PRESSURE: 72 MMHG | TEMPERATURE: 98 F | SYSTOLIC BLOOD PRESSURE: 139 MMHG | HEART RATE: 64 BPM | OXYGEN SATURATION: 95 %

## 2022-12-09 LAB
ANION GAP SERPL CALC-SCNC: 8 MMOL/L — SIGNIFICANT CHANGE UP (ref 5–17)
BUN SERPL-MCNC: 32 MG/DL — HIGH (ref 7–23)
CALCIUM SERPL-MCNC: 8.5 MG/DL — SIGNIFICANT CHANGE UP (ref 8.5–10.1)
CHLORIDE SERPL-SCNC: 105 MMOL/L — SIGNIFICANT CHANGE UP (ref 96–108)
CO2 SERPL-SCNC: 25 MMOL/L — SIGNIFICANT CHANGE UP (ref 22–31)
CREAT SERPL-MCNC: 1.5 MG/DL — HIGH (ref 0.5–1.3)
CULTURE RESULTS: SIGNIFICANT CHANGE UP
EGFR: 35 ML/MIN/1.73M2 — LOW
GLUCOSE SERPL-MCNC: 156 MG/DL — HIGH (ref 70–99)
HCT VFR BLD CALC: 37.5 % — SIGNIFICANT CHANGE UP (ref 34.5–45)
HGB BLD-MCNC: 12.4 G/DL — SIGNIFICANT CHANGE UP (ref 11.5–15.5)
MCHC RBC-ENTMCNC: 30.6 PG — SIGNIFICANT CHANGE UP (ref 27–34)
MCHC RBC-ENTMCNC: 33.1 GM/DL — SIGNIFICANT CHANGE UP (ref 32–36)
MCV RBC AUTO: 92.6 FL — SIGNIFICANT CHANGE UP (ref 80–100)
NRBC # BLD: 0 /100 WBCS — SIGNIFICANT CHANGE UP (ref 0–0)
PLATELET # BLD AUTO: 304 K/UL — SIGNIFICANT CHANGE UP (ref 150–400)
POTASSIUM SERPL-MCNC: 4.2 MMOL/L — SIGNIFICANT CHANGE UP (ref 3.5–5.3)
POTASSIUM SERPL-SCNC: 4.2 MMOL/L — SIGNIFICANT CHANGE UP (ref 3.5–5.3)
RBC # BLD: 4.05 M/UL — SIGNIFICANT CHANGE UP (ref 3.8–5.2)
RBC # FLD: 13.6 % — SIGNIFICANT CHANGE UP (ref 10.3–14.5)
SODIUM SERPL-SCNC: 138 MMOL/L — SIGNIFICANT CHANGE UP (ref 135–145)
SPECIMEN SOURCE: SIGNIFICANT CHANGE UP
WBC # BLD: 7.27 K/UL — SIGNIFICANT CHANGE UP (ref 3.8–10.5)
WBC # FLD AUTO: 7.27 K/UL — SIGNIFICANT CHANGE UP (ref 3.8–10.5)

## 2022-12-09 PROCEDURE — 83880 ASSAY OF NATRIURETIC PEPTIDE: CPT

## 2022-12-09 PROCEDURE — 85610 PROTHROMBIN TIME: CPT

## 2022-12-09 PROCEDURE — 84100 ASSAY OF PHOSPHORUS: CPT

## 2022-12-09 PROCEDURE — 85027 COMPLETE CBC AUTOMATED: CPT

## 2022-12-09 PROCEDURE — 80061 LIPID PANEL: CPT

## 2022-12-09 PROCEDURE — 82962 GLUCOSE BLOOD TEST: CPT

## 2022-12-09 PROCEDURE — 97162 PT EVAL MOD COMPLEX 30 MIN: CPT

## 2022-12-09 PROCEDURE — 97530 THERAPEUTIC ACTIVITIES: CPT

## 2022-12-09 PROCEDURE — 99233 SBSQ HOSP IP/OBS HIGH 50: CPT

## 2022-12-09 PROCEDURE — 74177 CT ABD & PELVIS W/CONTRAST: CPT | Mod: MA

## 2022-12-09 PROCEDURE — 85730 THROMBOPLASTIN TIME PARTIAL: CPT

## 2022-12-09 PROCEDURE — 87086 URINE CULTURE/COLONY COUNT: CPT

## 2022-12-09 PROCEDURE — 80048 BASIC METABOLIC PNL TOTAL CA: CPT

## 2022-12-09 PROCEDURE — 74183 MRI ABD W/O CNTR FLWD CNTR: CPT

## 2022-12-09 PROCEDURE — 84484 ASSAY OF TROPONIN QUANT: CPT

## 2022-12-09 PROCEDURE — 83605 ASSAY OF LACTIC ACID: CPT

## 2022-12-09 PROCEDURE — 86900 BLOOD TYPING SEROLOGIC ABO: CPT

## 2022-12-09 PROCEDURE — 71275 CT ANGIOGRAPHY CHEST: CPT | Mod: MA

## 2022-12-09 PROCEDURE — 86850 RBC ANTIBODY SCREEN: CPT

## 2022-12-09 PROCEDURE — 87449 NOS EACH ORGANISM AG IA: CPT

## 2022-12-09 PROCEDURE — 97110 THERAPEUTIC EXERCISES: CPT

## 2022-12-09 PROCEDURE — 85025 COMPLETE CBC W/AUTO DIFF WBC: CPT

## 2022-12-09 PROCEDURE — 87637 SARSCOV2&INF A&B&RSV AMP PRB: CPT

## 2022-12-09 PROCEDURE — 93970 EXTREMITY STUDY: CPT

## 2022-12-09 PROCEDURE — 96375 TX/PRO/DX INJ NEW DRUG ADDON: CPT

## 2022-12-09 PROCEDURE — 36415 COLL VENOUS BLD VENIPUNCTURE: CPT

## 2022-12-09 PROCEDURE — 99285 EMERGENCY DEPT VISIT HI MDM: CPT | Mod: 25

## 2022-12-09 PROCEDURE — 99232 SBSQ HOSP IP/OBS MODERATE 35: CPT

## 2022-12-09 PROCEDURE — 96365 THER/PROPH/DIAG IV INF INIT: CPT

## 2022-12-09 PROCEDURE — A9579: CPT

## 2022-12-09 PROCEDURE — 93306 TTE W/DOPPLER COMPLETE: CPT

## 2022-12-09 PROCEDURE — 87040 BLOOD CULTURE FOR BACTERIA: CPT

## 2022-12-09 PROCEDURE — 83036 HEMOGLOBIN GLYCOSYLATED A1C: CPT

## 2022-12-09 PROCEDURE — 71045 X-RAY EXAM CHEST 1 VIEW: CPT

## 2022-12-09 PROCEDURE — 93005 ELECTROCARDIOGRAM TRACING: CPT

## 2022-12-09 PROCEDURE — 83690 ASSAY OF LIPASE: CPT

## 2022-12-09 PROCEDURE — 81003 URINALYSIS AUTO W/O SCOPE: CPT

## 2022-12-09 PROCEDURE — 80053 COMPREHEN METABOLIC PANEL: CPT

## 2022-12-09 PROCEDURE — 84145 PROCALCITONIN (PCT): CPT

## 2022-12-09 PROCEDURE — 86901 BLOOD TYPING SEROLOGIC RH(D): CPT

## 2022-12-09 PROCEDURE — 83735 ASSAY OF MAGNESIUM: CPT

## 2022-12-09 RX ADMIN — Medication 100 MILLIGRAM(S): at 05:47

## 2022-12-09 RX ADMIN — Medication 40 MILLIGRAM(S): at 05:46

## 2022-12-09 RX ADMIN — CEFTRIAXONE 100 MILLIGRAM(S): 500 INJECTION, POWDER, FOR SOLUTION INTRAMUSCULAR; INTRAVENOUS at 05:47

## 2022-12-09 RX ADMIN — Medication 12.5 MILLIGRAM(S): at 05:46

## 2022-12-09 RX ADMIN — LISINOPRIL 20 MILLIGRAM(S): 2.5 TABLET ORAL at 05:46

## 2022-12-09 RX ADMIN — ENOXAPARIN SODIUM 40 MILLIGRAM(S): 100 INJECTION SUBCUTANEOUS at 05:46

## 2022-12-09 NOTE — PROGRESS NOTE ADULT - PROBLEM SELECTOR PLAN 1
Biliary distention on CT and abd discomfort at home, concern for retained stone  S/p cholecystectomy 1 week ago, lower abdominal discomfort/pain (now improved)  Alk phos 139, procal .25  CT mild central biliary distention - possible choledocholithiasis s/p lap cholecystectomy?  NPO - pending GI recs  MRCP with IV contrast ordered - f/u results, may need ERCP if +  On empiric antibiotics for cellulitis and possible PNA, Flagyl added for possible intraabd infection  GI consult, Dr Ford - f/u recs  PT eval = CORINA recommendation
Biliary distention on CT and abd discomfort at home, concern for retained stone  S/p cholecystectomy 1 week ago, lower abdominal discomfort/pain (now improved)  CT mild central biliary distention - possible choledocholithiasis s/p lap milind  MRCP was negative for retained stones so no need for ERCP  Diet advanced and patient tolerating    ID following for cellulitis/PNA: Will c/w CTX/Flagyl, BCx NGTD. Cellulitis is much improved with abxs so will complete course.     PT eval = CORINA recommendation
Biliary distention on CT and abd discomfort at home, concern for retained stone  S/p cholecystectomy 1 week ago, lower abdominal discomfort/pain (now improved)    CT mild central biliary distention - possible choledocholithiasis s/p lap milind  GI Follow up: unlikely biliary obstruction, follow up with MRCP, trend LFTs, NPO   If MRCP is + pt may need ERCP    ID following for cellulitis/PNA: Will c/w CTX/Flagyl, BCx NGTD, will f/u MRCP. Cellulitis is much improved with abxs     PT eval = CORINA recommendation
Biliary distention on CT and abd discomfort at home, concern for retained stone  S/p cholecystectomy 1 week ago, lower abdominal discomfort/pain (now improved)  CT mild central biliary distention - possible choledocholithiasis s/p lap milind  MRCP was negative for retained stones so no need for ERCP  Diet advanced and patient tolerating
Biliary distention on CT and abd discomfort at home, concern for retained stone  S/p cholecystectomy 1 week ago, lower abdominal discomfort/pain (now improved)  CT mild central biliary distention - possible choledocholithiasis s/p lap milind  MRCP was negative for retained stones so no need for ERCP  Diet advanced and patient tolerating

## 2022-12-09 NOTE — DISCHARGE NOTE PROVIDER - HOSPITAL COURSE
78 y/o F PMHx of dementia, T2DM, HTN, HLD, s/p cholecystectomy 1 week ago presented to the ED by ambulance for fever at home, abd discomfort/pain, and cough since last night 12/3 with CT showing mild central biliary distention admitted for cellulitis, PNA, possible choledocholithiasis s/p lap cholecystectomy and apparent volume overload.    Patient was admitted and concern was for possible retained stone given her recent history of Lap Gina.  MRCP was obtained and no retained stones were visualized and rest of the findings were consistent with post operative changes. Patients was treated with IV Antibiotics for cellulitis that did resolve.  ID Consultation was obtained.  Diet was advanced that patient tolerated and did well. Patient discharged home and asked to followup with PCP.         PHYSICAL EXAM:  Vital Signs Last 24 Hrs  T(C): 36.7 (09 Dec 2022 05:07), Max: 36.7 (08 Dec 2022 11:26)  T(F): 98.1 (09 Dec 2022 05:07), Max: 98.1 (09 Dec 2022 05:07)  HR: 64 (09 Dec 2022 05:07) (64 - 82)  BP: 139/72 (09 Dec 2022 05:07) (102/68 - 139/72)  BP(mean): --  RR: 18 (09 Dec 2022 05:07) (18 - 18)  SpO2: 95% (09 Dec 2022 05:07) (93% - 95%)    Parameters below as of 09 Dec 2022 05:07  Patient On (Oxygen Delivery Method): room air        GENERAL: NAD, well-groomed, well-developed  HEAD:  Atraumatic, Normocephalic  EYES: EOMI, PERRLA, conjunctiva and sclera clear  ENMT: No tonsillar erythema, exudates, or enlargement; Moist mucous membranes, Good dentition, No lesions  NECK: Supple, No JVD, Normal thyroid  NERVOUS SYSTEM:  Alert & Oriented X3, Good concentration; Motor Strength 5/5 B/L upper and lower extremities; CHEST/LUNG: Clear to auscultation bilaterally; No rales, rhonchi, wheezing, or rubs  HEART: Regular rate and rhythm; No murmurs, rubs, or gallops  ABDOMEN: Soft, Nontender, Nondistended; Bowel sounds present  EXTREMITIES:  2+ Peripheral Pulses, No clubbing, cyanosis, or edema  LYMPH: No lymphadenopathy noted  SKIN: No rashes or lesions

## 2022-12-09 NOTE — PROGRESS NOTE ADULT - PROBLEM SELECTOR PLAN 8
Continue home duloxetine

## 2022-12-09 NOTE — PROGRESS NOTE ADULT - PROBLEM SELECTOR PROBLEM 8
Psychiatric diagnosis

## 2022-12-09 NOTE — PROGRESS NOTE ADULT - PROBLEM SELECTOR PLAN 3
LLE is likely cellulitic - improved significantly on abxs  Duplex u/s b/l LE - No evidence of deep venous thrombosis in either lower extremity  Covered with empiric PNA coverage and last day of antibiotics should be today  ID following and consult noted
LLE is likely cellulitic - improved significantly on abxs  Duplex u/s b/l LE - No evidence of deep venous thrombosis in either lower extremity  Covered with empiric PNA coverage and last day of antibiotics should be today  ID following and consult noted
LLE is likely cellulitic - monitor area  Duplex u/s b/l LE - No evidence of deep venous thrombosis in either lower extremity  Covered with empiric PNA coverage
LLE is likely cellulitic - improved significantly on abxs  Duplex u/s b/l LE - No evidence of deep venous thrombosis in either lower extremity  Covered with empiric PNA coverage
LLE is likely cellulitic - improved significantly on abxs  Duplex u/s b/l LE - No evidence of deep venous thrombosis in either lower extremity  Covered with empiric PNA coverage

## 2022-12-09 NOTE — DISCHARGE NOTE PROVIDER - NSDCCPCAREPLAN_GEN_ALL_CORE_FT
PRINCIPAL DISCHARGE DIAGNOSIS  Diagnosis: Abdominal pain  Assessment and Plan of Treatment: You were admitted for abdominal pain.  Concern was for retained stones from your gallbladder removal.  MRI done that ruled out any retained stones.  Diet was advanced which you tolerated well.      SECONDARY DISCHARGE DIAGNOSES  Diagnosis: Lung infiltrate  Assessment and Plan of Treatment: Imaging did  a questionable finding of possible pneumonia.  But you did not endorse any symptoms so unlikely to be Pneumonia.  Consultation with Infectious disease was obtained as well.    Diagnosis: Cellulitis  Assessment and Plan of Treatment: You were treated with antibiotics for a skin infection as per our consultation with Infectious Disease. You completed the antibitoics during your hospital stay and symptoms have improved.  No further treatement is needed at this point.

## 2022-12-09 NOTE — DISCHARGE NOTE NURSING/CASE MANAGEMENT/SOCIAL WORK - PATIENT PORTAL LINK FT
You can access the FollowMyHealth Patient Portal offered by [facility] by registering at the following website: [email or web address]. By joining [facility]’s FollowMyHealth portal, you will also be able to view your health information using other applications (apps) compatible with our system. You can access the FollowMyHealth Patient Portal offered by [facility] by registering at the following website: [email or web address]. By joining [facility]’s FollowMyHealth portal, you will also be able to view your health information using other applications (apps) compatible with our system. You can access the FollowMyHealth Patient Portal offered by [facility] by registering at the following website: [email or web address]. By joining [facility]’s FollowMyHealth portal, you will also be able to view your health information using other applications (apps) compatible with our system.

## 2022-12-09 NOTE — PROGRESS NOTE ADULT - PROBLEM SELECTOR PROBLEM 1
S/P laparoscopic cholecystectomy

## 2022-12-09 NOTE — PROGRESS NOTE ADULT - PROBLEM SELECTOR PLAN 7
Home rosuvastatin 5mg QD  Therapeutic interchange
Home rosuvastatin 5mg QD  Therapeutic interchange
Home rosuvastatin 5mg QD  Therapeutic interchange  AM lipid panel
Home rosuvastatin 5mg QD  Therapeutic interchange
Home rosuvastatin 5mg QD  Therapeutic interchange

## 2022-12-09 NOTE — PROGRESS NOTE ADULT - PROBLEM SELECTOR PLAN 6
History of T2DM  Not on home insulin  AM A1C  Finger sticks, Consistent carb diet  Hypoglycemic protocol
History of T2DM  Not on home insulin  Finger sticks, Consistent carb diet  Hypoglycemic protocol

## 2022-12-09 NOTE — DISCHARGE NOTE NURSING/CASE MANAGEMENT/SOCIAL WORK - NSDCPEFALRISK_GEN_ALL_CORE
For information on Fall & Injury Prevention, visit: [email or web address] OR  [email or web address] OR  https://www.cdc.gov/steadi/patient.html For information on Fall & Injury Prevention, visit: [email or web address] OR  [email or web address] OR  https://www.cdc.gov/steadi/patient.html For information on Fall & Injury Prevention, visit: [email or web address] OR  [email or web address] OR  https://www.cdc.gov/steadi/patient.html

## 2022-12-09 NOTE — PROGRESS NOTE ADULT - PROBLEM SELECTOR PLAN 5
Patient hypertensive on admission  Continue Metoprolol and lisinopril w/ hold parameters
Patient hypertensive on admission  Continue Metoprolol and lisinopril w/ hold parameters
Patient hypertensive on admission  Continue home lisinopril w/ hold parameters
Patient hypertensive on admission  Continue Metoprolol and lisinopril w/ hold parameters
Patient hypertensive on admission  Continue Metoprolol and lisinopril w/ hold parameters

## 2022-12-09 NOTE — PROGRESS NOTE ADULT - PROBLEM SELECTOR PLAN 4
SOB x 1 day, reported fever prior to admission, WBC normal, procal .25  CXR: The lungs are clear with parenchymal density at the left CP angle that may reflect focus of atelectasis  CT PE Chest + Abd and pelvis: Elevated left hemidiaphragm and partial left lower lobe atelectasis. Superimposed infection cannot be excluded  On IV CTX  Trend wbc, fever  -repeat CT chest in one month to ensure resolution of possible PNA
SOB x 1 day, reported fever prior to admission, WBC normal, procal .25  CXR: The lungs are clear with parenchymal density at the left CP angle that may reflect focus of atelectasis  CT PE Chest + Abd and pelvis: Elevated left hemidiaphragm and partial left lower lobe atelectasis. Superimposed infection cannot be excluded  Empiric IV azithro + IV rocephin started, continue  Trend wbc, fever  -Dr Lyn infectious disease consulted due to concern for possible PNA and also cellulitis of LE  -repeat CT chest in one month to ensure resolution of possible PNA

## 2022-12-09 NOTE — DISCHARGE NOTE NURSING/CASE MANAGEMENT/SOCIAL WORK - NSSCNAMETXT_GEN_ALL_CORE
[place] [phone] vs rehab at home  [place] [phone] vs rehab at home  [place] [phone] vs rehab at home

## 2022-12-09 NOTE — PROGRESS NOTE ADULT - PROVIDER SPECIALTY LIST ADULT
Cardiology
Cardiology
Gastroenterology
Gastroenterology
Infectious Disease
Gastroenterology
Cardiology
Hospitalist
Cardiology
Gastroenterology
Infectious Disease
Cardiology
Hospitalist
Gastroenterology
Hospitalist

## 2022-12-09 NOTE — PROGRESS NOTE ADULT - SUBJECTIVE AND OBJECTIVE BOX
Subjective: Patient resting in bed comfortably. No overnight events. Tolerating diet and food well.     MEDICATIONS  (STANDING):  atorvastatin 20 milliGRAM(s) Oral at bedtime  dextrose 5%. 1000 milliLiter(s) (50 mL/Hr) IV Continuous <Continuous>  dextrose 5%. 1000 milliLiter(s) (100 mL/Hr) IV Continuous <Continuous>  dextrose 50% Injectable 25 Gram(s) IV Push once  dextrose 50% Injectable 12.5 Gram(s) IV Push once  dextrose 50% Injectable 25 Gram(s) IV Push once  DULoxetine 30 milliGRAM(s) Oral daily  enoxaparin Injectable 40 milliGRAM(s) SubCutaneous every 24 hours  furosemide    Tablet 40 milliGRAM(s) Oral daily  glucagon  Injectable 1 milliGRAM(s) IntraMuscular once  influenza  Vaccine (HIGH DOSE) 0.7 milliLiter(s) IntraMuscular once  insulin lispro (ADMELOG) corrective regimen sliding scale   SubCutaneous three times a day before meals  insulin lispro (ADMELOG) corrective regimen sliding scale   SubCutaneous at bedtime  lisinopril 20 milliGRAM(s) Oral daily  metoprolol succinate ER 12.5 milliGRAM(s) Oral daily    MEDICATIONS  (PRN):  acetaminophen     Tablet .. 650 milliGRAM(s) Oral every 6 hours PRN Temp greater or equal to 38C (100.4F), Mild Pain (1 - 3)  dextrose Oral Gel 15 Gram(s) Oral once PRN Blood Glucose LESS THAN 70 milliGRAM(s)/deciliter  ondansetron Injectable 4 milliGRAM(s) IV Push every 8 hours PRN Nausea and/or Vomiting  traMADol 50 milliGRAM(s) Oral once PRN Severe Pain (7 - 10)      Allergies    penicillin (Hives)    Intolerances        Vital Signs Last 24 Hrs  T(C): 36.7 (09 Dec 2022 05:07), Max: 36.7 (08 Dec 2022 11:26)  T(F): 98.1 (09 Dec 2022 05:07), Max: 98.1 (09 Dec 2022 05:07)  HR: 64 (09 Dec 2022 05:07) (64 - 82)  BP: 139/72 (09 Dec 2022 05:07) (102/68 - 139/72)  BP(mean): --  RR: 18 (09 Dec 2022 05:07) (18 - 18)  SpO2: 95% (09 Dec 2022 05:07) (93% - 95%)    Parameters below as of 09 Dec 2022 05:07  Patient On (Oxygen Delivery Method): room air        PHYSICAL EXAM:  GENERAL: NAD, well-groomed, well-developed  HEAD:  Atraumatic, Normocephalic  ENMT: Moist mucous membranes,   NECK: Supple, No JVD, Normal thyroid  NERVOUS SYSTEM:  All 4 extremities mobile, no gross sensory deficits.   CHEST/LUNG: Clear to auscultation bilaterally; No rales, rhonchi, wheezing, or rubs  HEART: Regular rate and rhythm; No murmurs, rubs, or gallops  ABDOMEN: Soft, Nontender, Nondistended; Bowel sounds present  EXTREMITIES:  2+ Peripheral Pulses, No clubbing, cyanosis, or edema      LABS:      Ca    8.8        08 Dec 2022 07:00          CAPILLARY BLOOD GLUCOSE      POCT Blood Glucose.: 127 mg/dL (09 Dec 2022 07:55)  POCT Blood Glucose.: 203 mg/dL (08 Dec 2022 21:47)  POCT Blood Glucose.: 137 mg/dL (08 Dec 2022 16:51)  POCT Blood Glucose.: 188 mg/dL (08 Dec 2022 11:49)      RADIOLOGY & ADDITIONAL TESTS:    Imaging Personally Reviewed:  [ ] YES     Consultant(s) Notes Reviewed:      Care Discussed with Consultants/Other Providers:    Advanced Directives: [ ] DNR  [ ] No feeding tube  [ ] MOLST in chart  [ ] MOLST completed today  [ ] Unknown

## 2022-12-09 NOTE — PROGRESS NOTE ADULT - PROBLEM SELECTOR PLAN 9
Med rec performed  Patient on Metoprolol, Myrbetriq, Tramadol, Rosuvastatin, Duloxetine, Lisinopril
Med rec performed  Patient seemingly on Metoprolol, Myrbetriq, Tramadol, Rosuvastatin, Duloxetine, Lisinopril  Some meds with sub 30 day supply, some meds 30 day supply last filled > 30 days ago  to d/w pharmacy for official med rec
Med rec performed  Patient on Metoprolol, Myrbetriq, Tramadol, Rosuvastatin, Duloxetine, Lisinopril

## 2022-12-09 NOTE — PROGRESS NOTE ADULT - PROBLEM SELECTOR PLAN 2
1 day of SOB, significant b/l LE edema and ankle erythema w/ warmth  BNP 2037, CT chest suggestive of pHTN  Duplex u/s b/l LE - No evidence of DVT  in either lower extremity    As per Cardio pt with no past h/o CAD or HF, will f/u with TTE, will cont with IV Lasix 40mg daily. Cont with Toprol XL 12.5mg daily and Lisinopril 20mg daily
1 day of SOB, significant b/l LE edema and ankle erythema w/ warmth  BNP 2037, CT chest suggestive of pHTN  Duplex u/s b/l LE - No evidence of DVT  in either lower extremity    As per Cardio pt with no past h/o CAD or HF, will f/u with TTE, will cont with IV Lasix 40mg daily. Cont with Toprol XL 12.5mg daily and Lisinopril 20mg daily
1 day of SOB, significant b/l LE edema and ankle erythema w/ warmth  BNP 2037, CT chest suggestive of pHTN  Duplex u/s b/l LE - No evidence of deep venous thrombosis in either lower extremity  Per hx patient dc'd lasix ~ 2 weeks ago though on personal med rec no Lasix reported  S/p Lasix 40 mg IV x 1 in ED, will start lasix 40mg IV daily  Restart metoprolol and lisinopril w/ hold parameters  TTE - f/u  Cardiology consulted, recs appreciated  Monitor hemodynamics
1 day of SOB, significant b/l LE edema and ankle erythema w/ warmth  BNP 2037, CT chest suggestive of pHTN  Duplex u/s b/l LE - No evidence of DVT  in either lower extremity    As per Cardio pt with no past h/o CAD or HF, will f/u with TTE, will cont with IV Lasix 40mg daily. Cont with Toprol XL 12.5mg daily and Lisinopril 20mg daily
1 day of SOB, significant b/l LE edema and ankle erythema w/ warmth  BNP 2037, CT chest suggestive of pHTN  Duplex u/s b/l LE - No evidence of DVT  in either lower extremity    As per Cardio pt with no past h/o CAD or HF, will f/u with TTE, will cont with IV Lasix 40mg daily. Cont with Toprol XL 12.5mg daily and Lisinopril 20mg daily

## 2022-12-09 NOTE — PROGRESS NOTE ADULT - NS ATTEND AMEND GEN_ALL_CORE FT
Appears vol OL.  Continue IV Lasix and maintain neg balance .  Monitor I, O, K, creatinine.  To follow while admitted.  At risk for decompensation.
Vol status improved.  Continue PO Lasix.  NO objection to d/c.  Suggested outpatient cardiac follow up.
edema in the setting of cellulitis  s/p lasix iv, now po  echo normal ef, no sig valve disease  improved
Appears compensated from HF POV. switch to po lasix. abx per primary. Further cardiac workup will depend on clinical course.
cont lasix. monitor vol. Please continue to maintain strict I/Os, monitor daily weights, Cr, and K. Further cardiac workup will depend on clinical course.

## 2022-12-09 NOTE — PROGRESS NOTE ADULT - REASON FOR ADMISSION
Fever

## 2022-12-09 NOTE — PROGRESS NOTE ADULT - SUBJECTIVE AND OBJECTIVE BOX
[facility], Division of Infectious Diseases  JERRY Bee Y. Patel, S. Shah, G. Riki  [phone]  ([phone] - weekdays after 5pm and weekends)    Name: OMA SPRING  Age/Gender: 77y Female  MRN: [id number]    Interval History:  Patient seen this morning.   Notes reviewed.   No concerning overnight events.  Afebrile.     Allergies: penicillin (Hives)      Objective:  Vitals:   T(F): 98.1 (12-09-22 @ 05:07), Max: 98.1 (12-09-22 @ 05:07)  HR: 64 (12-09-22 @ 05:07) (64 - 78)  BP: 139/72 (12-09-22 @ 05:07) (119/71 - 139/72)  RR: 18 (12-09-22 @ 05:07) (18 - 18)  SpO2: 95% (12-09-22 @ 05:07) (93% - 95%)  Physical Examination:  General: no acute distress  HEENT: anicteric  Cardio: S1, S2, normal rate  Resp: breathing comfortably on RA  Abd: soft, NT, ND  Ext: b/l LE edema, venous stasis dermatitis  Skin: warm, dry    Laboratory Studies:  CBC:                       12.4   7.27  )-----------( 304      ( 09 Dec 2022 08:39 )             37.5     WBC Trend:  7.27 12-09-22 @ 08:39  8.28 12-08-22 @ 07:00  6.75 12-07-22 @ 06:50  6.54 12-06-22 @ 06:41  8.49 12-05-22 @ 05:37  10.08 12-04-22 @ 08:30    CMP: 12-09    138  |  105  |  32<H>  ----------------------------<  156<H>  4.2   |  25  |  1.50<H>    Ca    8.5      09 Dec 2022 08:39              Microbiology: reviewed     Culture - Urine (collected 12-05-22 @ 08:11)  Source: Clean Catch Clean Catch (Midstream)  Final Report (12-06-22 @ 09:53):    <10,000 CFU/mL Normal Urogenital Deepthi    Culture - Blood (collected 12-04-22 @ 08:40)  Source: .Blood Blood-Peripheral  Final Report (12-09-22 @ 13:00):    No Growth Final    Culture - Blood (collected 12-04-22 @ 08:30)  Source: .Blood Blood-Peripheral  Final Report (12-09-22 @ 13:00):    No Growth Final        Radiology: reviewed     Medications:  acetaminophen     Tablet .. 650 milliGRAM(s) Oral every 6 hours PRN  atorvastatin 20 milliGRAM(s) Oral at bedtime  dextrose 5%. 1000 milliLiter(s) IV Continuous <Continuous>  dextrose 5%. 1000 milliLiter(s) IV Continuous <Continuous>  dextrose 50% Injectable 25 Gram(s) IV Push once  dextrose 50% Injectable 12.5 Gram(s) IV Push once  dextrose 50% Injectable 25 Gram(s) IV Push once  dextrose Oral Gel 15 Gram(s) Oral once PRN  DULoxetine 30 milliGRAM(s) Oral daily  enoxaparin Injectable 40 milliGRAM(s) SubCutaneous every 24 hours  furosemide    Tablet 40 milliGRAM(s) Oral daily  glucagon  Injectable 1 milliGRAM(s) IntraMuscular once  influenza  Vaccine (HIGH DOSE) 0.7 milliLiter(s) IntraMuscular once  insulin lispro (ADMELOG) corrective regimen sliding scale   SubCutaneous three times a day before meals  insulin lispro (ADMELOG) corrective regimen sliding scale   SubCutaneous at bedtime  lisinopril 20 milliGRAM(s) Oral daily  metoprolol succinate ER 12.5 milliGRAM(s) Oral daily  ondansetron Injectable 4 milliGRAM(s) IV Push every 8 hours PRN  traMADol 50 milliGRAM(s) Oral once PRN    Antimicrobials:   [facility], Division of Infectious Diseases  JERRY Bee Y. Patel, S. Shah, G. Riki  [phone]  ([phone] - weekdays after 5pm and weekends)    Name: OMA SPRING  Age/Gender: 77y Female  MRN: [id number]    Interval History:  Patient seen this morning.   Notes reviewed.   No concerning overnight events.  Afebrile.     Allergies: penicillin (Hives)      Objective:  Vitals:   T(F): 98.1 (12-09-22 @ 05:07), Max: 98.1 (12-09-22 @ 05:07)  HR: 64 (12-09-22 @ 05:07) (64 - 78)  BP: 139/72 (12-09-22 @ 05:07) (119/71 - 139/72)  RR: 18 (12-09-22 @ 05:07) (18 - 18)  SpO2: 95% (12-09-22 @ 05:07) (93% - 95%)  Physical Examination:  General: no acute distress  HEENT: anicteric  Cardio: S1, S2, normal rate  Resp: breathing comfortably on RA  Abd: soft, NT, ND  Ext: b/l LE edema, venous stasis dermatitis  Skin: warm, dry    Laboratory Studies:  CBC:                       12.4   7.27  )-----------( 304      ( 09 Dec 2022 08:39 )             37.5     WBC Trend:  7.27 12-09-22 @ 08:39  8.28 12-08-22 @ 07:00  6.75 12-07-22 @ 06:50  6.54 12-06-22 @ 06:41  8.49 12-05-22 @ 05:37  10.08 12-04-22 @ 08:30    CMP: 12-09    138  |  105  |  32<H>  ----------------------------<  156<H>  4.2   |  25  |  1.50<H>    Ca    8.5      09 Dec 2022 08:39              Microbiology: reviewed     Culture - Urine (collected 12-05-22 @ 08:11)  Source: Clean Catch Clean Catch (Midstream)  Final Report (12-06-22 @ 09:53):    <10,000 CFU/mL Normal Urogenital Deepthi    Culture - Blood (collected 12-04-22 @ 08:40)  Source: .Blood Blood-Peripheral  Final Report (12-09-22 @ 13:00):    No Growth Final    Culture - Blood (collected 12-04-22 @ 08:30)  Source: .Blood Blood-Peripheral  Final Report (12-09-22 @ 13:00):    No Growth Final        Radiology: reviewed     Medications:  acetaminophen     Tablet .. 650 milliGRAM(s) Oral every 6 hours PRN  atorvastatin 20 milliGRAM(s) Oral at bedtime  dextrose 5%. 1000 milliLiter(s) IV Continuous <Continuous>  dextrose 5%. 1000 milliLiter(s) IV Continuous <Continuous>  dextrose 50% Injectable 25 Gram(s) IV Push once  dextrose 50% Injectable 12.5 Gram(s) IV Push once  dextrose 50% Injectable 25 Gram(s) IV Push once  dextrose Oral Gel 15 Gram(s) Oral once PRN  DULoxetine 30 milliGRAM(s) Oral daily  enoxaparin Injectable 40 milliGRAM(s) SubCutaneous every 24 hours  furosemide    Tablet 40 milliGRAM(s) Oral daily  glucagon  Injectable 1 milliGRAM(s) IntraMuscular once  influenza  Vaccine (HIGH DOSE) 0.7 milliLiter(s) IntraMuscular once  insulin lispro (ADMELOG) corrective regimen sliding scale   SubCutaneous three times a day before meals  insulin lispro (ADMELOG) corrective regimen sliding scale   SubCutaneous at bedtime  lisinopril 20 milliGRAM(s) Oral daily  metoprolol succinate ER 12.5 milliGRAM(s) Oral daily  ondansetron Injectable 4 milliGRAM(s) IV Push every 8 hours PRN  traMADol 50 milliGRAM(s) Oral once PRN    Antimicrobials:   [facility], Division of Infectious Diseases  JERRY Bee Y. Patel, S. Shah, G. Riki  [phone]  ([phone] - weekdays after 5pm and weekends)    Name: OMA SPRING  Age/Gender: 77y Female  MRN: [id number]    Interval History:  Patient seen this morning.   Notes reviewed.   No concerning overnight events.  Afebrile.     Allergies: penicillin (Hives)      Objective:  Vitals:   T(F): 98.1 (12-09-22 @ 05:07), Max: 98.1 (12-09-22 @ 05:07)  HR: 64 (12-09-22 @ 05:07) (64 - 78)  BP: 139/72 (12-09-22 @ 05:07) (119/71 - 139/72)  RR: 18 (12-09-22 @ 05:07) (18 - 18)  SpO2: 95% (12-09-22 @ 05:07) (93% - 95%)  Physical Examination:  General: no acute distress  HEENT: anicteric  Cardio: S1, S2, normal rate  Resp: breathing comfortably on RA  Abd: soft, NT, ND  Ext: b/l LE edema, venous stasis dermatitis  Skin: warm, dry    Laboratory Studies:  CBC:                       12.4   7.27  )-----------( 304      ( 09 Dec 2022 08:39 )             37.5     WBC Trend:  7.27 12-09-22 @ 08:39  8.28 12-08-22 @ 07:00  6.75 12-07-22 @ 06:50  6.54 12-06-22 @ 06:41  8.49 12-05-22 @ 05:37  10.08 12-04-22 @ 08:30    CMP: 12-09    138  |  105  |  32<H>  ----------------------------<  156<H>  4.2   |  25  |  1.50<H>    Ca    8.5      09 Dec 2022 08:39              Microbiology: reviewed     Culture - Urine (collected 12-05-22 @ 08:11)  Source: Clean Catch Clean Catch (Midstream)  Final Report (12-06-22 @ 09:53):    <10,000 CFU/mL Normal Urogenital Deepthi    Culture - Blood (collected 12-04-22 @ 08:40)  Source: .Blood Blood-Peripheral  Final Report (12-09-22 @ 13:00):    No Growth Final    Culture - Blood (collected 12-04-22 @ 08:30)  Source: .Blood Blood-Peripheral  Final Report (12-09-22 @ 13:00):    No Growth Final        Radiology: reviewed     Medications:  acetaminophen     Tablet .. 650 milliGRAM(s) Oral every 6 hours PRN  atorvastatin 20 milliGRAM(s) Oral at bedtime  dextrose 5%. 1000 milliLiter(s) IV Continuous <Continuous>  dextrose 5%. 1000 milliLiter(s) IV Continuous <Continuous>  dextrose 50% Injectable 25 Gram(s) IV Push once  dextrose 50% Injectable 12.5 Gram(s) IV Push once  dextrose 50% Injectable 25 Gram(s) IV Push once  dextrose Oral Gel 15 Gram(s) Oral once PRN  DULoxetine 30 milliGRAM(s) Oral daily  enoxaparin Injectable 40 milliGRAM(s) SubCutaneous every 24 hours  furosemide    Tablet 40 milliGRAM(s) Oral daily  glucagon  Injectable 1 milliGRAM(s) IntraMuscular once  influenza  Vaccine (HIGH DOSE) 0.7 milliLiter(s) IntraMuscular once  insulin lispro (ADMELOG) corrective regimen sliding scale   SubCutaneous three times a day before meals  insulin lispro (ADMELOG) corrective regimen sliding scale   SubCutaneous at bedtime  lisinopril 20 milliGRAM(s) Oral daily  metoprolol succinate ER 12.5 milliGRAM(s) Oral daily  ondansetron Injectable 4 milliGRAM(s) IV Push every 8 hours PRN  traMADol 50 milliGRAM(s) Oral once PRN    Antimicrobials:

## 2022-12-09 NOTE — PROGRESS NOTE ADULT - ASSESSMENT
78 y/o F PMHx of dementia, HTN, HLD s/p cholecystectomy 1 week ago who presented w/ SOB and abd discomfort.     SOB- resolved  abd pain- resolved  US LE neg for DVT  s/p CTA- 1. No pulmonary embolus. Elevated left hemidiaphragm and partial left lower lobe atelectasis. Superimposed infection cannot be excluded  s/p CT abd/pelvis- cholecystectomy. No loculated fluid collection along the gallbladder fossa. Mild central biliary distention   s/p MRCP- Status post cholecystectomy with mild central intrahepatic biliary ductal dilatation, periductal enhancement and edema/ hyperenhancement in the gallbladder fossa; No choledocholithiasis.  s/p TTE  f/u blood cx- NGTD  low suspicion for PNA but will plan to complete empiric 5 day course of antibiotics for PNA and for intra-abdominal coverage  s/p ceftriaxone/ flagyl x 5 day course, completed yesterday  discharge planning    cellulitis- resolved  daughter reports LLE shin w/ bright red erythema which has improved since starting antibiotics  no erythema present at this time    edema  diuresis per cards    Rl Lyn M.D.  [facility], Division of Infectious Diseases  [phone]  After 5pm on weekdays and all day on weekends - please call [phone] 76 y/o F PMHx of dementia, HTN, HLD s/p cholecystectomy 1 week ago who presented w/ SOB and abd discomfort.     SOB- resolved  abd pain- resolved  US LE neg for DVT  s/p CTA- 1. No pulmonary embolus. Elevated left hemidiaphragm and partial left lower lobe atelectasis. Superimposed infection cannot be excluded  s/p CT abd/pelvis- cholecystectomy. No loculated fluid collection along the gallbladder fossa. Mild central biliary distention   s/p MRCP- Status post cholecystectomy with mild central intrahepatic biliary ductal dilatation, periductal enhancement and edema/ hyperenhancement in the gallbladder fossa; No choledocholithiasis.  s/p TTE  f/u blood cx- NGTD  low suspicion for PNA but will plan to complete empiric 5 day course of antibiotics for PNA and for intra-abdominal coverage  s/p ceftriaxone/ flagyl x 5 day course, completed yesterday  discharge planning    cellulitis- resolved  daughter reports LLE shin w/ bright red erythema which has improved since starting antibiotics  no erythema present at this time    edema  diuresis per cards    Rl Lyn M.D.  [facility], Division of Infectious Diseases  [phone]  After 5pm on weekdays and all day on weekends - please call [phone] 78 y/o F PMHx of dementia, HTN, HLD s/p cholecystectomy 1 week ago who presented w/ SOB and abd discomfort.     SOB- resolved  abd pain- resolved  US LE neg for DVT  s/p CTA- 1. No pulmonary embolus. Elevated left hemidiaphragm and partial left lower lobe atelectasis. Superimposed infection cannot be excluded  s/p CT abd/pelvis- cholecystectomy. No loculated fluid collection along the gallbladder fossa. Mild central biliary distention   s/p MRCP- Status post cholecystectomy with mild central intrahepatic biliary ductal dilatation, periductal enhancement and edema/ hyperenhancement in the gallbladder fossa; No choledocholithiasis.  s/p TTE  f/u blood cx- NGTD  low suspicion for PNA but will plan to complete empiric 5 day course of antibiotics for PNA and for intra-abdominal coverage  s/p ceftriaxone/ flagyl x 5 day course, completed yesterday  discharge planning    cellulitis- resolved  daughter reports LLE shin w/ bright red erythema which has improved since starting antibiotics  no erythema present at this time    edema  diuresis per cards    Rl Lyn M.D.  [facility], Division of Infectious Diseases  [phone]  After 5pm on weekdays and all day on weekends - please call [phone]

## 2022-12-09 NOTE — DISCHARGE NOTE PROVIDER - NSDCMRMEDTOKEN_GEN_ALL_CORE_FT
DULoxetine 30 mg oral delayed release capsule: 1 cap(s) orally once a day  Lasix 40 mg oral tablet: 1 tab(s) orally once a day  lisinopril 20 mg oral tablet: 1 tab(s) orally once a day  metoprolol succinate 25 mg oral tablet, extended release: 12.5 milligram(s) orally once a day  Myrbetriq 25 mg oral tablet, extended release: 1 tab(s) orally once a day  rosuvastatin 5 mg oral tablet: 1 tab(s) orally once a day (at bedtime)

## 2022-12-09 NOTE — PROGRESS NOTE ADULT - SUBJECTIVE AND OBJECTIVE BOX
[facility] -- Nasim Wilcox, Antonio Melgar, Aviva Mendez: Office # [phone]    Follow Up:      Subjective/Observations: Patient seen and examined. Patient alert awake, Denies chest pain palpitation dizziness, denies difficulty breathing , no orthopnea or PND noted. Tolerating room air      REVIEW OF SYSTEMS: All other review of systems are negative unless indicated above    PAST MEDICAL & SURGICAL HISTORY:  Diabetes mellitus  Hyperlipidemia  Hypertension  Right cataractsurgery 11/29/22  Glaucoma  S/P [id number]/29/2022        MEDICATIONS  (STANDING):  atorvastatin 20 milliGRAM(s) Oral at bedtime  dextrose 5%. 1000 milliLiter(s) (50 mL/Hr) IV Continuous <Continuous>  dextrose 5%. 1000 milliLiter(s) (100 mL/Hr) IV Continuous <Continuous>  dextrose 50% Injectable 25 Gram(s) IV Push once  dextrose 50% Injectable 12.5 Gram(s) IV Push once  dextrose 50% Injectable 25 Gram(s) IV Push once  DULoxetine 30 milliGRAM(s) Oral daily  enoxaparin Injectable 40 milliGRAM(s) SubCutaneous every 24 hours  furosemide    Tablet 40 milliGRAM(s) Oral daily  glucagon  Injectable 1 milliGRAM(s) IntraMuscular once  influenza  Vaccine (HIGH DOSE) 0.7 milliLiter(s) IntraMuscular once  insulin lispro (ADMELOG) corrective regimen sliding scale   SubCutaneous three times a day before meals  insulin lispro (ADMELOG) corrective regimen sliding scale   SubCutaneous at bedtime  lisinopril 20 milliGRAM(s) Oral daily  metoprolol succinate ER 12.5 milliGRAM(s) Oral daily    MEDICATIONS  (PRN):  acetaminophen     Tablet .. 650 milliGRAM(s) Oral every 6 hours PRN Temp greater or equal to 38C (100.4F), Mild Pain (1 - 3)  dextrose Oral Gel 15 Gram(s) Oral once PRN Blood Glucose LESS THAN 70 milliGRAM(s)/deciliter  ondansetron Injectable 4 milliGRAM(s) IV Push every 8 hours PRN Nausea and/or Vomiting  traMADol 50 milliGRAM(s) Oral once PRN Severe Pain (7 - 10)    Allergies  penicillin (Hives)          Vital Signs Last 24 Hrs  T(C): 36.7 (09 Dec 2022 05:07), Max: 36.7 (08 Dec 2022 11:26)  T(F): 98.1 (09 Dec 2022 05:07), Max: 98.1 (09 Dec 2022 05:07)  HR: 64 (09 Dec 2022 05:07) (64 - 82)  BP: 139/72 (09 Dec 2022 05:07) (102/68 - 139/72)  RR: 18 (09 Dec 2022 05:07) (18 - 18)  SpO2: 95% (09 Dec 2022 05:07) (93% - 95%)    Parameters below as of 09 Dec 2022 05:07  Patient On (Oxygen Delivery Method): room air      I&O's Summary    08 Dec 2022 07:01  -  09 Dec 2022 07:00  --------------------------------------------------------  IN: 0 mL / OUT: 800 mL / NET: -800 mL            PHYSICAL EXAM:  Constitutional: NAD, awake and alert,   HEENT: Moist Mucous Membranes, Anicteric  Pulmonary: Non-labored, breath sounds are clear bilaterally, No wheezing, rales or rhonchi  Cardiovascular: Regular, S1 and S2, No murmurs, No rubs, gallops or clicks  Gastrointestinal:  soft, nontender, nondistended   : dark color urine in prima fit bag  Lymph: No peripheral edema. No lymphadenopathy.   Skin: No visible rashes or ulcers, b/l LE cellulitis noted  Psych:  Mood & affect appropriate      LABS: All Labs Reviewed:                        12.5   8.28  )-----------( 351      ( 08 Dec 2022 07:00 )             38.3                         11.8   6.75  )-----------( 331      ( 07 Dec 2022 06:50 )             36.0     08 Dec 2022 07:00    139    |  104    |  24     ----------------------------<  132    4.2     |  27     |  1.30   07 Dec 2022 06:50    140    |  104    |  16     ----------------------------<  105    3.7     |  27     |  1.10     Ca    8.8        08 Dec 2022 07:00  Ca    8.7        07 Dec 2022 06:50  Phos  2.8       07 Dec 2022 06:50  Mg     2.2       07 Dec 2022 06:50    TPro  6.1    /  Alb  2.3    /  TBili  0.2    /  DBili  x      /  AST  14     /  ALT  27     /  AlkPhos  104    07 Dec 2022 06:50     Troponin I, High Sensitivity Result: 13.9 ng/L (12-04-22 @ 08:30)  Cholesterol, Serum: 154 mg/dL (12-05-22 @ 05:37)  HDL Cholesterol, Serum: 40 mg/dL (12-05-22 @ 05:37)  Triglycerides, Serum: 105 mg/dL (12-05-22 @ 05:37)    12 Lead ECG:   Ventricular Rate 68 BPM    Atrial Rate 68 BPM    P-R Interval 146 ms    QRS Duration 76 ms    Q-T Interval 410 ms    QTC Calculation(Bazett) 435 ms    P Axis 61 degrees    R Axis -5 degrees    T Axis 111 degrees    Diagnosis Line *** Poor data quality, interpretation may be adversely affected  Normal sinus rhythm  ST & T wave abnormality, consider lateral ischemia  Abnormal ECG  No previous ECGs available  Confirmed by MARLENY FONSECA ([id number]) on 12/4/2022 4:09:07 PM (12-04-22 @ 09:20)      ACC: [id number] EXAM:  ECHO TTE WO CON COMP W DOPP                          PROCEDURE DATE:  12/05/2022          INTERPRETATION:  INDICATION: Edema  Sonographer AS    Blood Pressure 135/80    Height 162 cm     Weight 90 kg       BSA 1.9 sq m    Dimensions:  LA 3.4       Normal Values: 2.0 - 4.0 cm  Ao 3.1        Normal Values: 2.0 - 3.8 cm  SEPTUM 1.1       Normal Values: 0.6 - 1.2 cm  PWT 1.1       Normal Values: 0.6 - 1.1 cm  LVIDd 3.3         Normal Values: 3.0 - 5.6 cm  LVIDs 2.2         Normal Values: 1.8 - 4.0 cm      OBSERVATIONS:  Technically difficult and limited study  Mitral Valve: Mitral annular calcification with thickened leaflets, trace   physiologic MR.  Aortic Valve/Aorta: The aortic valve is not well-visualized but appears   calcified. Peak transaortic valve gradient is 29 mmHg with a mean   transaortic valve gradient of 17 mmHg. The aortic valve area is   calculated to be 1.6 sq cm by continuity equation. This consistent with   mild aortic stenosis.  Tricuspid Valve: normal with trace TR  Pulmonic Valve: Trace PI  Left Atrium: normal  Right Atrium: Not well-visualized  Left Ventricle: normal LV size and systolic function, estimated LVEF of   60-65%.  Right Ventricle: Grossly normal size and systolic function.  Pericardium: no significant pericardial effusion.  Pulmonary/RV Pressure: estimated PA systolic pressure of 21 mmHg assuming   an RA pressure of 3 mmHg.  The IVC measures 0.9 cm        IMPRESSION:  Technically difficult and limited study  Normal left ventricular internal dimensions and systolic function,   estimated LVEF of 60-65%.  Grossly normal RV size and systolic function.  Calcified aortic valve with mild aortic stenosis, without AI.  Trace physiologic MR and TR.  No significant pericardial effusion.    --- End of Report ---           [facility] -- Nasim Wilcox, Antonio Melgar, Aviva Mendez: Office # [phone]    Follow Up:      Subjective/Observations: Patient seen and examined. Patient alert awake, Denies chest pain palpitation dizziness, denies difficulty breathing , no orthopnea or PND noted. Tolerating room air      REVIEW OF SYSTEMS: All other review of systems are negative unless indicated above    PAST MEDICAL & SURGICAL HISTORY:  Diabetes mellitus  Hyperlipidemia  Hypertension  Right cataractsurgery 11/29/22  Glaucoma  S/P [id number]/29/2022        MEDICATIONS  (STANDING):  atorvastatin 20 milliGRAM(s) Oral at bedtime  dextrose 5%. 1000 milliLiter(s) (50 mL/Hr) IV Continuous <Continuous>  dextrose 5%. 1000 milliLiter(s) (100 mL/Hr) IV Continuous <Continuous>  dextrose 50% Injectable 25 Gram(s) IV Push once  dextrose 50% Injectable 12.5 Gram(s) IV Push once  dextrose 50% Injectable 25 Gram(s) IV Push once  DULoxetine 30 milliGRAM(s) Oral daily  enoxaparin Injectable 40 milliGRAM(s) SubCutaneous every 24 hours  furosemide    Tablet 40 milliGRAM(s) Oral daily  glucagon  Injectable 1 milliGRAM(s) IntraMuscular once  influenza  Vaccine (HIGH DOSE) 0.7 milliLiter(s) IntraMuscular once  insulin lispro (ADMELOG) corrective regimen sliding scale   SubCutaneous three times a day before meals  insulin lispro (ADMELOG) corrective regimen sliding scale   SubCutaneous at bedtime  lisinopril 20 milliGRAM(s) Oral daily  metoprolol succinate ER 12.5 milliGRAM(s) Oral daily    MEDICATIONS  (PRN):  acetaminophen     Tablet .. 650 milliGRAM(s) Oral every 6 hours PRN Temp greater or equal to 38C (100.4F), Mild Pain (1 - 3)  dextrose Oral Gel 15 Gram(s) Oral once PRN Blood Glucose LESS THAN 70 milliGRAM(s)/deciliter  ondansetron Injectable 4 milliGRAM(s) IV Push every 8 hours PRN Nausea and/or Vomiting  traMADol 50 milliGRAM(s) Oral once PRN Severe Pain (7 - 10)    Allergies  penicillin (Hives)          Vital Signs Last 24 Hrs  T(C): 36.7 (09 Dec 2022 05:07), Max: 36.7 (08 Dec 2022 11:26)  T(F): 98.1 (09 Dec 2022 05:07), Max: 98.1 (09 Dec 2022 05:07)  HR: 64 (09 Dec 2022 05:07) (64 - 82)  BP: 139/72 (09 Dec 2022 05:07) (102/68 - 139/72)  RR: 18 (09 Dec 2022 05:07) (18 - 18)  SpO2: 95% (09 Dec 2022 05:07) (93% - 95%)    Parameters below as of 09 Dec 2022 05:07  Patient On (Oxygen Delivery Method): room air      I&O's Summary    08 Dec 2022 07:01  -  09 Dec 2022 07:00  --------------------------------------------------------  IN: 0 mL / OUT: 800 mL / NET: -800 mL            PHYSICAL EXAM:  Constitutional: NAD, awake and alert,   HEENT: Moist Mucous Membranes, Anicteric  Pulmonary: Non-labored, breath sounds are clear bilaterally, No wheezing, rales or rhonchi  Cardiovascular: Regular, S1 and S2, No murmurs, No rubs, gallops or clicks  Gastrointestinal:  soft, nontender, nondistended   : dark color urine in prima fit bag  Lymph: No peripheral edema. No lymphadenopathy.   Skin: No visible rashes or ulcers, b/l LE cellulitis noted  Psych:  Mood & affect appropriate      LABS: All Labs Reviewed:                        12.5   8.28  )-----------( 351      ( 08 Dec 2022 07:00 )             38.3                         11.8   6.75  )-----------( 331      ( 07 Dec 2022 06:50 )             36.0     08 Dec 2022 07:00    139    |  104    |  24     ----------------------------<  132    4.2     |  27     |  1.30   07 Dec 2022 06:50    140    |  104    |  16     ----------------------------<  105    3.7     |  27     |  1.10     Ca    8.8        08 Dec 2022 07:00  Ca    8.7        07 Dec 2022 06:50  Phos  2.8       07 Dec 2022 06:50  Mg     2.2       07 Dec 2022 06:50    TPro  6.1    /  Alb  2.3    /  TBili  0.2    /  DBili  x      /  AST  14     /  ALT  27     /  AlkPhos  104    07 Dec 2022 06:50     Troponin I, High Sensitivity Result: 13.9 ng/L (12-04-22 @ 08:30)  Cholesterol, Serum: 154 mg/dL (12-05-22 @ 05:37)  HDL Cholesterol, Serum: 40 mg/dL (12-05-22 @ 05:37)  Triglycerides, Serum: 105 mg/dL (12-05-22 @ 05:37)    12 Lead ECG:   Ventricular Rate 68 BPM    Atrial Rate 68 BPM    P-R Interval 146 ms    QRS Duration 76 ms    Q-T Interval 410 ms    QTC Calculation(Bazett) 435 ms    P Axis 61 degrees    R Axis -5 degrees    T Axis 111 degrees    Diagnosis Line *** Poor data quality, interpretation may be adversely affected  Normal sinus rhythm  ST & T wave abnormality, consider lateral ischemia  Abnormal ECG  No previous ECGs available  Confirmed by MARLENY FONSECA ([id number]) on 12/4/2022 4:09:07 PM (12-04-22 @ 09:20)      ACC: [id number] EXAM:  ECHO TTE WO CON COMP W DOPP                          PROCEDURE DATE:  12/05/2022          INTERPRETATION:  INDICATION: Edema  Sonographer AS    Blood Pressure 135/80    Height 162 cm     Weight 90 kg       BSA 1.9 sq m    Dimensions:  LA 3.4       Normal Values: 2.0 - 4.0 cm  Ao 3.1        Normal Values: 2.0 - 3.8 cm  SEPTUM 1.1       Normal Values: 0.6 - 1.2 cm  PWT 1.1       Normal Values: 0.6 - 1.1 cm  LVIDd 3.3         Normal Values: 3.0 - 5.6 cm  LVIDs 2.2         Normal Values: 1.8 - 4.0 cm      OBSERVATIONS:  Technically difficult and limited study  Mitral Valve: Mitral annular calcification with thickened leaflets, trace   physiologic MR.  Aortic Valve/Aorta: The aortic valve is not well-visualized but appears   calcified. Peak transaortic valve gradient is 29 mmHg with a mean   transaortic valve gradient of 17 mmHg. The aortic valve area is   calculated to be 1.6 sq cm by continuity equation. This consistent with   mild aortic stenosis.  Tricuspid Valve: normal with trace TR  Pulmonic Valve: Trace PI  Left Atrium: normal  Right Atrium: Not well-visualized  Left Ventricle: normal LV size and systolic function, estimated LVEF of   60-65%.  Right Ventricle: Grossly normal size and systolic function.  Pericardium: no significant pericardial effusion.  Pulmonary/RV Pressure: estimated PA systolic pressure of 21 mmHg assuming   an RA pressure of 3 mmHg.  The IVC measures 0.9 cm        IMPRESSION:  Technically difficult and limited study  Normal left ventricular internal dimensions and systolic function,   estimated LVEF of 60-65%.  Grossly normal RV size and systolic function.  Calcified aortic valve with mild aortic stenosis, without AI.  Trace physiologic MR and TR.  No significant pericardial effusion.    --- End of Report ---           [facility] -- Nasim Wilcox, Antonio Melgar, Aviva Mendez: Office # [phone]    Follow Up:      Subjective/Observations: Patient seen and examined. Patient alert awake, Denies chest pain palpitation dizziness, denies difficulty breathing , no orthopnea or PND noted. Tolerating room air      REVIEW OF SYSTEMS: All other review of systems are negative unless indicated above    PAST MEDICAL & SURGICAL HISTORY:  Diabetes mellitus  Hyperlipidemia  Hypertension  Right cataractsurgery 11/29/22  Glaucoma  S/P [id number]/29/2022        MEDICATIONS  (STANDING):  atorvastatin 20 milliGRAM(s) Oral at bedtime  dextrose 5%. 1000 milliLiter(s) (50 mL/Hr) IV Continuous <Continuous>  dextrose 5%. 1000 milliLiter(s) (100 mL/Hr) IV Continuous <Continuous>  dextrose 50% Injectable 25 Gram(s) IV Push once  dextrose 50% Injectable 12.5 Gram(s) IV Push once  dextrose 50% Injectable 25 Gram(s) IV Push once  DULoxetine 30 milliGRAM(s) Oral daily  enoxaparin Injectable 40 milliGRAM(s) SubCutaneous every 24 hours  furosemide    Tablet 40 milliGRAM(s) Oral daily  glucagon  Injectable 1 milliGRAM(s) IntraMuscular once  influenza  Vaccine (HIGH DOSE) 0.7 milliLiter(s) IntraMuscular once  insulin lispro (ADMELOG) corrective regimen sliding scale   SubCutaneous three times a day before meals  insulin lispro (ADMELOG) corrective regimen sliding scale   SubCutaneous at bedtime  lisinopril 20 milliGRAM(s) Oral daily  metoprolol succinate ER 12.5 milliGRAM(s) Oral daily    MEDICATIONS  (PRN):  acetaminophen     Tablet .. 650 milliGRAM(s) Oral every 6 hours PRN Temp greater or equal to 38C (100.4F), Mild Pain (1 - 3)  dextrose Oral Gel 15 Gram(s) Oral once PRN Blood Glucose LESS THAN 70 milliGRAM(s)/deciliter  ondansetron Injectable 4 milliGRAM(s) IV Push every 8 hours PRN Nausea and/or Vomiting  traMADol 50 milliGRAM(s) Oral once PRN Severe Pain (7 - 10)    Allergies  penicillin (Hives)          Vital Signs Last 24 Hrs  T(C): 36.7 (09 Dec 2022 05:07), Max: 36.7 (08 Dec 2022 11:26)  T(F): 98.1 (09 Dec 2022 05:07), Max: 98.1 (09 Dec 2022 05:07)  HR: 64 (09 Dec 2022 05:07) (64 - 82)  BP: 139/72 (09 Dec 2022 05:07) (102/68 - 139/72)  RR: 18 (09 Dec 2022 05:07) (18 - 18)  SpO2: 95% (09 Dec 2022 05:07) (93% - 95%)    Parameters below as of 09 Dec 2022 05:07  Patient On (Oxygen Delivery Method): room air      I&O's Summary    08 Dec 2022 07:01  -  09 Dec 2022 07:00  --------------------------------------------------------  IN: 0 mL / OUT: 800 mL / NET: -800 mL            PHYSICAL EXAM:  Constitutional: NAD, awake and alert,   HEENT: Moist Mucous Membranes, Anicteric  Pulmonary: Non-labored, breath sounds are clear bilaterally, No wheezing, rales or rhonchi  Cardiovascular: Regular, S1 and S2, No murmurs, No rubs, gallops or clicks  Gastrointestinal:  soft, nontender, nondistended   : dark color urine in prima fit bag  Lymph: No peripheral edema. No lymphadenopathy.   Skin: No visible rashes or ulcers, b/l LE cellulitis noted  Psych:  Mood & affect appropriate      LABS: All Labs Reviewed:                        12.5   8.28  )-----------( 351      ( 08 Dec 2022 07:00 )             38.3                         11.8   6.75  )-----------( 331      ( 07 Dec 2022 06:50 )             36.0     08 Dec 2022 07:00    139    |  104    |  24     ----------------------------<  132    4.2     |  27     |  1.30   07 Dec 2022 06:50    140    |  104    |  16     ----------------------------<  105    3.7     |  27     |  1.10     Ca    8.8        08 Dec 2022 07:00  Ca    8.7        07 Dec 2022 06:50  Phos  2.8       07 Dec 2022 06:50  Mg     2.2       07 Dec 2022 06:50    TPro  6.1    /  Alb  2.3    /  TBili  0.2    /  DBili  x      /  AST  14     /  ALT  27     /  AlkPhos  104    07 Dec 2022 06:50     Troponin I, High Sensitivity Result: 13.9 ng/L (12-04-22 @ 08:30)  Cholesterol, Serum: 154 mg/dL (12-05-22 @ 05:37)  HDL Cholesterol, Serum: 40 mg/dL (12-05-22 @ 05:37)  Triglycerides, Serum: 105 mg/dL (12-05-22 @ 05:37)    12 Lead ECG:   Ventricular Rate 68 BPM    Atrial Rate 68 BPM    P-R Interval 146 ms    QRS Duration 76 ms    Q-T Interval 410 ms    QTC Calculation(Bazett) 435 ms    P Axis 61 degrees    R Axis -5 degrees    T Axis 111 degrees    Diagnosis Line *** Poor data quality, interpretation may be adversely affected  Normal sinus rhythm  ST & T wave abnormality, consider lateral ischemia  Abnormal ECG  No previous ECGs available  Confirmed by MARLENY FONSECA ([id number]) on 12/4/2022 4:09:07 PM (12-04-22 @ 09:20)      ACC: [id number] EXAM:  ECHO TTE WO CON COMP W DOPP                          PROCEDURE DATE:  12/05/2022          INTERPRETATION:  INDICATION: Edema  Sonographer AS    Blood Pressure 135/80    Height 162 cm     Weight 90 kg       BSA 1.9 sq m    Dimensions:  LA 3.4       Normal Values: 2.0 - 4.0 cm  Ao 3.1        Normal Values: 2.0 - 3.8 cm  SEPTUM 1.1       Normal Values: 0.6 - 1.2 cm  PWT 1.1       Normal Values: 0.6 - 1.1 cm  LVIDd 3.3         Normal Values: 3.0 - 5.6 cm  LVIDs 2.2         Normal Values: 1.8 - 4.0 cm      OBSERVATIONS:  Technically difficult and limited study  Mitral Valve: Mitral annular calcification with thickened leaflets, trace   physiologic MR.  Aortic Valve/Aorta: The aortic valve is not well-visualized but appears   calcified. Peak transaortic valve gradient is 29 mmHg with a mean   transaortic valve gradient of 17 mmHg. The aortic valve area is   calculated to be 1.6 sq cm by continuity equation. This consistent with   mild aortic stenosis.  Tricuspid Valve: normal with trace TR  Pulmonic Valve: Trace PI  Left Atrium: normal  Right Atrium: Not well-visualized  Left Ventricle: normal LV size and systolic function, estimated LVEF of   60-65%.  Right Ventricle: Grossly normal size and systolic function.  Pericardium: no significant pericardial effusion.  Pulmonary/RV Pressure: estimated PA systolic pressure of 21 mmHg assuming   an RA pressure of 3 mmHg.  The IVC measures 0.9 cm        IMPRESSION:  Technically difficult and limited study  Normal left ventricular internal dimensions and systolic function,   estimated LVEF of 60-65%.  Grossly normal RV size and systolic function.  Calcified aortic valve with mild aortic stenosis, without AI.  Trace physiologic MR and TR.  No significant pericardial effusion.    --- End of Report ---

## 2022-12-09 NOTE — PROGRESS NOTE ADULT - ASSESSMENT
78 y/o F with no cardiac Hx, s/p cholecystectomy 1 week ago presented to the ED c/o fever and some abdominal tenderness, found to have main pulmonary enlargement and possible volume overload.  Also noted to have possible cellulitis with superimposed edema of BLE.    Volume Overload/Edema  - No known Hx of CAD or HF  - Pro-BNP is elevated at 2000  and  BLE edema with concurrent cellulitis on admission  -continue Lasix  40  PO mg  daily.   - Volume status improved  - Strict I/O's and daily weights-> 800cc dark urine   - Technically difficult ECHO showed normal LV & RV size and function EF 60-65%, trace MR and TR, mild AS, PASP 21    - EKG with poor baseline, appears SR without sig ischemia.  - Continue statin    - BP stable and controlled, BP: 102/68 - 139/72,   - continue BB and lisinopril.    - Sepsis w/u per Primary  - Monitor and replete lytes, keep K>4, Mg>2.  - Will continue to follow 76 y/o F with no cardiac Hx, s/p cholecystectomy 1 week ago presented to the ED c/o fever and some abdominal tenderness, found to have main pulmonary enlargement and possible volume overload.  Also noted to have possible cellulitis with superimposed edema of BLE.    Volume Overload/Edema  - No known Hx of CAD or HF  - Pro-BNP is elevated at 2000  and  BLE edema with concurrent cellulitis on admission  -continue Lasix  40  PO mg  daily.   - Volume status improved  - Strict I/O's and daily weights-> 800cc dark urine   - Technically difficult ECHO showed normal LV & RV size and function EF 60-65%, trace MR and TR, mild AS, PASP 21    - EKG with poor baseline, appears SR without sig ischemia.  - Continue statin    - BP stable and controlled, BP: 102/68 - 139/72,   - continue BB and lisinopril.    - Sepsis w/u per Primary  - Monitor and replete lytes, keep K>4, Mg>2.  - Will continue to follow

## 2022-12-09 NOTE — PROGRESS NOTE ADULT - SUBJECTIVE AND OBJECTIVE BOX
[facility]  John Marcelino PA-C  [address] [place] [address]  [phone]      INTERVAL HPI/OVERNIGHT EVENTS:  Pt s/e  Reports no new GI events    MEDICATIONS  (STANDING):  atorvastatin 20 milliGRAM(s) Oral at bedtime  cefTRIAXone   IVPB 1000 milliGRAM(s) IV Intermittent every 24 hours  dextrose 5%. 1000 milliLiter(s) (50 mL/Hr) IV Continuous <Continuous>  dextrose 5%. 1000 milliLiter(s) (100 mL/Hr) IV Continuous <Continuous>  dextrose 50% Injectable 25 Gram(s) IV Push once  dextrose 50% Injectable 12.5 Gram(s) IV Push once  dextrose 50% Injectable 25 Gram(s) IV Push once  DULoxetine 30 milliGRAM(s) Oral daily  enoxaparin Injectable 40 milliGRAM(s) SubCutaneous every 24 hours  furosemide    Tablet 40 milliGRAM(s) Oral daily  glucagon  Injectable 1 milliGRAM(s) IntraMuscular once  influenza  Vaccine (HIGH DOSE) 0.7 milliLiter(s) IntraMuscular once  insulin lispro (ADMELOG) corrective regimen sliding scale   SubCutaneous three times a day before meals  insulin lispro (ADMELOG) corrective regimen sliding scale   SubCutaneous at bedtime  lisinopril 20 milliGRAM(s) Oral daily  metoprolol succinate ER 12.5 milliGRAM(s) Oral daily  metroNIDAZOLE  IVPB 500 milliGRAM(s) IV Intermittent every 8 hours    MEDICATIONS  (PRN):  acetaminophen     Tablet .. 650 milliGRAM(s) Oral every 6 hours PRN Temp greater or equal to 38C (100.4F), Mild Pain (1 - 3)  dextrose Oral Gel 15 Gram(s) Oral once PRN Blood Glucose LESS THAN 70 milliGRAM(s)/deciliter  ondansetron Injectable 4 milliGRAM(s) IV Push every 8 hours PRN Nausea and/or Vomiting  traMADol 50 milliGRAM(s) Oral once PRN Severe Pain (7 - 10)      Allergies    penicillin (Hives)      PHYSICAL EXAM:   Vital Signs Last 24 Hrs  T(C): 36.7 (09 Dec 2022 05:07), Max: 36.7 (08 Dec 2022 11:26)  T(F): 98.1 (09 Dec 2022 05:07), Max: 98.1 (09 Dec 2022 05:07)  HR: 64 (09 Dec 2022 05:07) (64 - 82)  BP: 139/72 (09 Dec 2022 05:07) (102/68 - 139/72)  BP(mean): --  RR: 18 (09 Dec 2022 05:07) (18 - 18)  SpO2: 95% (09 Dec 2022 05:07) (93% - 95%)    Parameters below as of 09 Dec 2022 05:07  Patient On (Oxygen Delivery Method): room air      GENERAL:  Appears stated age  ABDOMEN:  Soft, non-tender, non-distended  NEURO:  Alert      LABS:                          12.4   7.27  )-----------( 304      ( 09 Dec 2022 08:39 )             37.5     12-08    139  |  104  |  24<H>  ----------------------------<  132<H>  4.2   |  27  |  1.30    Ca    8.8      08 Dec 2022 07:00     [facility]  John Marcelino PA-C  [address] [place] [address]  [phone]      INTERVAL HPI/OVERNIGHT EVENTS:  Pt s/e  Reports no new GI events    MEDICATIONS  (STANDING):  atorvastatin 20 milliGRAM(s) Oral at bedtime  cefTRIAXone   IVPB 1000 milliGRAM(s) IV Intermittent every 24 hours  dextrose 5%. 1000 milliLiter(s) (50 mL/Hr) IV Continuous <Continuous>  dextrose 5%. 1000 milliLiter(s) (100 mL/Hr) IV Continuous <Continuous>  dextrose 50% Injectable 25 Gram(s) IV Push once  dextrose 50% Injectable 12.5 Gram(s) IV Push once  dextrose 50% Injectable 25 Gram(s) IV Push once  DULoxetine 30 milliGRAM(s) Oral daily  enoxaparin Injectable 40 milliGRAM(s) SubCutaneous every 24 hours  furosemide    Tablet 40 milliGRAM(s) Oral daily  glucagon  Injectable 1 milliGRAM(s) IntraMuscular once  influenza  Vaccine (HIGH DOSE) 0.7 milliLiter(s) IntraMuscular once  insulin lispro (ADMELOG) corrective regimen sliding scale   SubCutaneous three times a day before meals  insulin lispro (ADMELOG) corrective regimen sliding scale   SubCutaneous at bedtime  lisinopril 20 milliGRAM(s) Oral daily  metoprolol succinate ER 12.5 milliGRAM(s) Oral daily  metroNIDAZOLE  IVPB 500 milliGRAM(s) IV Intermittent every 8 hours    MEDICATIONS  (PRN):  acetaminophen     Tablet .. 650 milliGRAM(s) Oral every 6 hours PRN Temp greater or equal to 38C (100.4F), Mild Pain (1 - 3)  dextrose Oral Gel 15 Gram(s) Oral once PRN Blood Glucose LESS THAN 70 milliGRAM(s)/deciliter  ondansetron Injectable 4 milliGRAM(s) IV Push every 8 hours PRN Nausea and/or Vomiting  traMADol 50 milliGRAM(s) Oral once PRN Severe Pain (7 - 10)      Allergies    penicillin (Hives)      PHYSICAL EXAM:   Vital Signs Last 24 Hrs  T(C): 36.7 (09 Dec 2022 05:07), Max: 36.7 (08 Dec 2022 11:26)  T(F): 98.1 (09 Dec 2022 05:07), Max: 98.1 (09 Dec 2022 05:07)  HR: 64 (09 Dec 2022 05:07) (64 - 82)  BP: 139/72 (09 Dec 2022 05:07) (102/68 - 139/72)  BP(mean): --  RR: 18 (09 Dec 2022 05:07) (18 - 18)  SpO2: 95% (09 Dec 2022 05:07) (93% - 95%)    Parameters below as of 09 Dec 2022 05:07  Patient On (Oxygen Delivery Method): room air      GENERAL:  Appears stated age  ABDOMEN:  Soft, non-tender, non-distended  NEURO:  Alert      LABS:                          12.4   7.27  )-----------( 304      ( 09 Dec 2022 08:39 )             37.5     12-08    139  |  104  |  24<H>  ----------------------------<  132<H>  4.2   |  27  |  1.30    Ca    8.8      08 Dec 2022 07:00     [facility]  John Marcelino PA-C  [address] [place] [address]  [phone]      INTERVAL HPI/OVERNIGHT EVENTS:  Pt s/e  Reports no new GI events    MEDICATIONS  (STANDING):  atorvastatin 20 milliGRAM(s) Oral at bedtime  cefTRIAXone   IVPB 1000 milliGRAM(s) IV Intermittent every 24 hours  dextrose 5%. 1000 milliLiter(s) (50 mL/Hr) IV Continuous <Continuous>  dextrose 5%. 1000 milliLiter(s) (100 mL/Hr) IV Continuous <Continuous>  dextrose 50% Injectable 25 Gram(s) IV Push once  dextrose 50% Injectable 12.5 Gram(s) IV Push once  dextrose 50% Injectable 25 Gram(s) IV Push once  DULoxetine 30 milliGRAM(s) Oral daily  enoxaparin Injectable 40 milliGRAM(s) SubCutaneous every 24 hours  furosemide    Tablet 40 milliGRAM(s) Oral daily  glucagon  Injectable 1 milliGRAM(s) IntraMuscular once  influenza  Vaccine (HIGH DOSE) 0.7 milliLiter(s) IntraMuscular once  insulin lispro (ADMELOG) corrective regimen sliding scale   SubCutaneous three times a day before meals  insulin lispro (ADMELOG) corrective regimen sliding scale   SubCutaneous at bedtime  lisinopril 20 milliGRAM(s) Oral daily  metoprolol succinate ER 12.5 milliGRAM(s) Oral daily  metroNIDAZOLE  IVPB 500 milliGRAM(s) IV Intermittent every 8 hours    MEDICATIONS  (PRN):  acetaminophen     Tablet .. 650 milliGRAM(s) Oral every 6 hours PRN Temp greater or equal to 38C (100.4F), Mild Pain (1 - 3)  dextrose Oral Gel 15 Gram(s) Oral once PRN Blood Glucose LESS THAN 70 milliGRAM(s)/deciliter  ondansetron Injectable 4 milliGRAM(s) IV Push every 8 hours PRN Nausea and/or Vomiting  traMADol 50 milliGRAM(s) Oral once PRN Severe Pain (7 - 10)      Allergies    penicillin (Hives)      PHYSICAL EXAM:   Vital Signs Last 24 Hrs  T(C): 36.7 (09 Dec 2022 05:07), Max: 36.7 (08 Dec 2022 11:26)  T(F): 98.1 (09 Dec 2022 05:07), Max: 98.1 (09 Dec 2022 05:07)  HR: 64 (09 Dec 2022 05:07) (64 - 82)  BP: 139/72 (09 Dec 2022 05:07) (102/68 - 139/72)  BP(mean): --  RR: 18 (09 Dec 2022 05:07) (18 - 18)  SpO2: 95% (09 Dec 2022 05:07) (93% - 95%)    Parameters below as of 09 Dec 2022 05:07  Patient On (Oxygen Delivery Method): room air      GENERAL:  Appears stated age  ABDOMEN:  Soft, non-tender, non-distended  NEURO:  Alert      LABS:                          12.4   7.27  )-----------( 304      ( 09 Dec 2022 08:39 )             37.5     12-08    139  |  104  |  24<H>  ----------------------------<  132<H>  4.2   |  27  |  1.30    Ca    8.8      08 Dec 2022 07:00

## 2022-12-14 ENCOUNTER — APPOINTMENT (OUTPATIENT)
Dept: ULTRASOUND IMAGING | Facility: IMAGING CENTER | Age: 77
End: 2022-12-14

## 2022-12-14 ENCOUNTER — OUTPATIENT (OUTPATIENT)
Dept: OUTPATIENT SERVICES | Facility: HOSPITAL | Age: 77
LOS: 1 days | End: 2022-12-14
Payer: MEDICARE

## 2022-12-14 DIAGNOSIS — Z00.8 ENCOUNTER FOR OTHER GENERAL EXAMINATION: ICD-10-CM

## 2022-12-14 DIAGNOSIS — Z90.710 ACQUIRED ABSENCE OF BOTH CERVIX AND UTERUS: Chronic | ICD-10-CM

## 2022-12-14 DIAGNOSIS — Z90.49 ACQUIRED ABSENCE OF OTHER SPECIFIED PARTS OF DIGESTIVE TRACT: Chronic | ICD-10-CM

## 2022-12-14 PROBLEM — N18.9 CHRONIC KIDNEY DISEASE, UNSPECIFIED: Chronic | Status: ACTIVE | Noted: 2019-10-31

## 2022-12-14 PROBLEM — Z00.00 ENCOUNTER FOR PREVENTIVE HEALTH EXAMINATION: Status: ACTIVE | Noted: 2022-12-14

## 2022-12-14 PROBLEM — R41.3 OTHER AMNESIA: Chronic | Status: ACTIVE | Noted: 2019-10-31

## 2022-12-14 PROCEDURE — 93970 EXTREMITY STUDY: CPT

## 2022-12-14 PROCEDURE — 93970 EXTREMITY STUDY: CPT | Mod: 26

## 2022-12-15 ENCOUNTER — TRANSCRIPTION ENCOUNTER (OUTPATIENT)
Age: 77
End: 2022-12-15

## 2023-02-10 ENCOUNTER — APPOINTMENT (OUTPATIENT)
Dept: GASTROENTEROLOGY | Facility: CLINIC | Age: 78
End: 2023-02-10
Payer: MEDICARE

## 2023-02-10 VITALS
OXYGEN SATURATION: 98 % | TEMPERATURE: 98.4 F | BODY MASS INDEX: 30.73 KG/M2 | HEART RATE: 58 BPM | WEIGHT: 180 LBS | SYSTOLIC BLOOD PRESSURE: 172 MMHG | DIASTOLIC BLOOD PRESSURE: 78 MMHG | HEIGHT: 64 IN

## 2023-02-10 DIAGNOSIS — Z12.11 ENCOUNTER FOR SCREENING FOR MALIGNANT NEOPLASM OF COLON: ICD-10-CM

## 2023-02-10 DIAGNOSIS — Z90.49 ACQUIRED ABSENCE OF OTHER SPECIFIED PARTS OF DIGESTIVE TRACT: ICD-10-CM

## 2023-02-10 PROCEDURE — 99204 OFFICE O/P NEW MOD 45 MIN: CPT

## 2023-02-10 NOTE — HISTORY OF PRESENT ILLNESS
[FreeTextEntry1] : Post Choly / Pancreatitis\par \par Labs by PCP - Pt will get \par \par Feeling well\par \par Needs colon screening

## 2023-02-10 NOTE — PHYSICAL EXAM
[Alert] : alert [Normal Voice/Communication] : normal voice/communication [Healthy Appearing] : healthy appearing [No Acute Distress] : no acute distress [Sclera] : the sclera and conjunctiva were normal [Hearing Threshold Finger Rub Not [place]] : hearing was normal [Normal Lips/Gums] : the lips and gums were normal [Oropharynx] : the oropharynx was normal [Normal Appearance] : the appearance of the neck was normal [No Neck Mass] : no neck mass was observed [No Respiratory Distress] : no respiratory distress [No Acc Muscle Use] : no accessory muscle use [Respiration, Rhythm And Depth] : normal respiratory rhythm and effort [Auscultation Breath Sounds / Voice Sounds] : lungs were clear to auscultation bilaterally [Heart Rate And Rhythm] : heart rate was normal and rhythm regular [Normal S1, S2] : normal S1 and S2 [Murmurs] : no murmurs [Bowel Sounds] : normal bowel sounds [Abdomen Tenderness] : non-tender [No Masses] : no abdominal mass palpated [Abdomen Soft] : soft [] : no hepatosplenomegaly [Oriented To Time, Place, And Person] : oriented to person, place, and time

## 2023-02-10 NOTE — ASSESSMENT
[FreeTextEntry1] : Labs/Lipase\par \par Low Fat \par \par Low FODMAP\par \par Cologuard\par \par I spent 45 minutes reviewing the patients records prior to arrival, with patient , and reviewing records after visit. All prior testing reviewed at length. All questions were answered.\par \par

## 2023-04-17 ENCOUNTER — EMERGENCY (EMERGENCY)
Facility: HOSPITAL | Age: 78
LOS: 1 days | Discharge: HOME | End: 2023-04-17
Attending: STUDENT IN AN ORGANIZED HEALTH CARE EDUCATION/TRAINING PROGRAM | Admitting: STUDENT IN AN ORGANIZED HEALTH CARE EDUCATION/TRAINING PROGRAM
Payer: MEDICARE

## 2023-04-17 VITALS
RESPIRATION RATE: 16 BRPM | DIASTOLIC BLOOD PRESSURE: 61 MMHG | SYSTOLIC BLOOD PRESSURE: 144 MMHG | OXYGEN SATURATION: 98 % | TEMPERATURE: 98 F | HEART RATE: 59 BPM

## 2023-04-17 VITALS
OXYGEN SATURATION: 100 % | HEART RATE: 67 BPM | SYSTOLIC BLOOD PRESSURE: 129 MMHG | HEIGHT: 65 IN | DIASTOLIC BLOOD PRESSURE: 52 MMHG | WEIGHT: 190.04 LBS | TEMPERATURE: 99 F | RESPIRATION RATE: 18 BRPM

## 2023-04-17 DIAGNOSIS — Z90.49 ACQUIRED ABSENCE OF OTHER SPECIFIED PARTS OF DIGESTIVE TRACT: Chronic | ICD-10-CM

## 2023-04-17 PROCEDURE — 72125 CT NECK SPINE W/O DYE: CPT | Mod: MA

## 2023-04-17 PROCEDURE — 99285 EMERGENCY DEPT VISIT HI MDM: CPT

## 2023-04-17 PROCEDURE — 73080 X-RAY EXAM OF ELBOW: CPT

## 2023-04-17 PROCEDURE — 70450 CT HEAD/BRAIN W/O DYE: CPT | Mod: MA

## 2023-04-17 PROCEDURE — 72125 CT NECK SPINE W/O DYE: CPT | Mod: 26,MA

## 2023-04-17 PROCEDURE — 99284 EMERGENCY DEPT VISIT MOD MDM: CPT | Mod: 25

## 2023-04-17 PROCEDURE — 73080 X-RAY EXAM OF ELBOW: CPT | Mod: 26,RT

## 2023-04-17 PROCEDURE — 70450 CT HEAD/BRAIN W/O DYE: CPT | Mod: 26,MA

## 2023-04-17 NOTE — ED PROVIDER NOTE - PATIENT PORTAL LINK FT
You can access the FollowMyHealth Patient Portal offered by [facility] by registering at the following website: [email or web address]. By joining [employer]’s FollowMyHealth portal, you will also be able to view your health information using other applications (apps) compatible with our system. You can access the FollowMyHealth Patient Portal offered by [facility] by registering at the following website: [email or web address]. By joining [employer]’s FollowMyHealth portal, you will also be able to view your health information using other applications (apps) compatible with our system. You can access the FollowMyHealth Patient Portal offered by [facility] by registering at the following website: [email or web address]. By joining [employer]’s FollowMyHealth portal, you will also be able to view your health information using other applications (apps) compatible with our system.

## 2023-04-17 NOTE — ED PROVIDER NOTE - NSFOLLOWUPINSTRUCTIONS_ED_ALL_ED_FT
1. TAKE ALL MEDICATIONS AS DIRECTED.    2. FOR PAIN OR FEVER YOU CAN TAKE IBUPROFEN (MOTRIN, ADVIL) OR ACETAMINOPHEN (TYLENOL) AS NEEDED, AS DIRECTED ON PACKAGING.  3. FOLLOW UP WITH YOUR PRIMARY DOCTOR WITHIN 5 DAYS AS DIRECTED.  4. IF YOU HAD LABS OR IMAGING DONE, YOU WERE GIVEN COPIES OF ALL LABS AND/OR IMAGING RESULTS FROM YOUR ER VISIT--PLEASE TAKE THEM WITH YOU TO YOUR FOLLOW UP APPOINTMENTS.  5. IF NEEDED, CALL PATIENT ACCESS SERVICES AT [phone] TO FIND A PRIMARY CARE PHYSICIAN.  OR CALL [phone] TO MAKE AN APPOINTMENT WITH THE CLINIC.  6. RETURN TO THE ER FOR ANY WORSENING SYMPTOMS OR CONCERNS.    Closed Head Injury    A closed head injury is an injury to your head that may or may not involve a traumatic brain injury (TBI). Symptoms of TBI can be short or long lasting and include headache, dizziness, interference with memory or speech, fatigue, confusion, changes in sleep, mood changes, nausea, depression/anxiety, and dulling of senses. Make sure to obtain proper rest which includes getting plenty of sleep, avoiding excessive visual stimulation, and avoiding activities that may cause physical or mental stress. Avoid any situation where there is potential for another head injury, including sports.    SEEK IMMEDIATE MEDICAL CARE IF YOU HAVE ANY OF THE FOLLOWING SYMPTOMS: unusual drowsiness, vomiting, severe dizziness, seizures, lightheadedness, muscular weakness, different pupil sizes, visual changes, or clear or bloody discharge from your ears or nose. 1. TAKE ALL MEDICATIONS AS DIRECTED.    2. FOR PAIN OR FEVER YOU CAN TAKE IBUPROFEN (MOTRIN, ADVIL) OR ACETAMINOPHEN (TYLENOL) AS NEEDED, AS DIRECTED ON PACKAGING.  3. FOLLOW UP WITH YOUR PRIMARY DOCTOR WITHIN 5 DAYS AS DIRECTED.  4. IF YOU HAD LABS OR IMAGING DONE, YOU WERE GIVEN COPIES OF ALL LABS AND/OR IMAGING RESULTS FROM YOUR ER VISIT--PLEASE TAKE THEM WITH YOU TO YOUR FOLLOW UP APPOINTMENTS.  5. IF NEEDED, CALL PATIENT ACCESS SERVICES AT [phone] TO FIND A PRIMARY CARE PHYSICIAN.  OR CALL [phone] TO MAKE AN APPOINTMENT WITH THE CLINIC.  6. RETURN TO THE ER FOR ANY WORSENING SYMPTOMS OR CONCERNS.    Closed Head Injury    A closed head injury is an injury to your head that may or may not involve a traumatic brain injury (TBI). Symptoms of TBI can be short or long lasting and include headache, dizziness, interference with memory or speech, fatigue, confusion, changes in sleep, mood changes, nausea, depression/anxiety, and dulling of senses. Make sure to obtain proper rest which includes getting plenty of sleep, avoiding excessive visual stimulation, and avoiding activities that may cause physical or mental stress. Avoid any situation where there is potential for another head injury, including sports.    SEEK IMMEDIATE MEDICAL CARE IF YOU HAVE ANY OF THE FOLLOWING SYMPTOMS: unusual drowsiness, vomiting, severe dizziness, seizures, lightheadedness, muscular weakness, different pupil sizes, visual changes, or clear or bloody discharge from your ears or nose. 1. TAKE ALL MEDICATIONS AS DIRECTED.    2. FOR PAIN OR FEVER YOU CAN TAKE IBUPROFEN (MOTRIN, ADVIL) OR ACETAMINOPHEN (TYLENOL) AS NEEDED, AS DIRECTED ON PACKAGING.  3. FOLLOW UP WITH YOUR PRIMARY DOCTOR WITHIN 5 DAYS AS DIRECTED.  4. IF YOU HAD LABS OR IMAGING DONE, YOU WERE GIVEN COPIES OF ALL LABS AND/OR IMAGING RESULTS FROM YOUR ER VISIT--PLEASE TAKE THEM WITH YOU TO YOUR FOLLOW UP APPOINTMENTS.  5. IF NEEDED, CALL PATIENT ACCESS SERVICES AT [phone] TO FIND A PRIMARY CARE PHYSICIAN.  OR CALL [phone] TO MAKE AN APPOINTMENT WITH THE CLINIC.  6. RETURN TO THE ER FOR ANY WORSENING SYMPTOMS OR CONCERNS.    Closed Head Injury    A closed head injury is an injury to your head that may or may not involve a traumatic brain injury (TBI). Symptoms of TBI can be short or long lasting and include headache, dizziness, interference with memory or speech, fatigue, confusion, changes in sleep, mood changes, nausea, depression/anxiety, and dulling of senses. Make sure to obtain proper rest which includes getting plenty of sleep, avoiding excessive visual stimulation, and avoiding activities that may cause physical or mental stress. Avoid any situation where there is potential for another head injury, including sports.    SEEK IMMEDIATE MEDICAL CARE IF YOU HAVE ANY OF THE FOLLOWING SYMPTOMS: unusual drowsiness, vomiting, severe dizziness, seizures, lightheadedness, muscular weakness, different pupil sizes, visual changes, or clear or bloody discharge from your ears or nose.

## 2023-04-17 NOTE — ED PROVIDER NOTE - OBJECTIVE STATEMENT
78-year-old female with a history of dm, htn, ,dementia  presents with fall.  Patient was upstairs in granddaughter's room was trying to get in bed and slipped off the bed and fell and hit her head.  Did not have any loss of consciousness no nausea or vomiting, complains of pain in the back of her head as well as her right elbow.  Patient was helped up and was able to ambulate after fall. no ac use, initially did not ocmplain of neck pain b ut now says she has neck pain.   no hip pain

## 2023-04-17 NOTE — ED ADULT TRIAGE NOTE - CHIEF COMPLAINT QUOTE
Pt brought to ed by daughter who states pt tripped and fell in a bedroom at 6am.   Pt did hit her head and is c/o L finger and BL knee pain as well as pain to posterior head.   Pt alert and confused at baseline per daughter at this time.  No facial asymmetry, R pupil irregular/fixed and daughter reports hx of surgery on that eye, L pupils 4mm and reactive.  No additional acte neuro deficits.  Pt denies cervical/spinal tenderness. BN

## 2023-04-17 NOTE — ED ADULT NURSE NOTE - NSIMPLEMENTINTERV_GEN_ALL_ED
Implemented All Fall Risk Interventions:  [place] to call system. Call bell, personal items and telephone within reach. Instruct patient to call for assistance. Room bathroom lighting operational. Non-slip footwear when patient is off stretcher. Physically safe environment: no spills, clutter or unnecessary equipment. Stretcher in lowest position, wheels locked, appropriate side rails in place. Provide visual cue, wrist band, yellow gown, etc. Monitor gait and stability. Monitor for mental status changes and reorient to person, place, and time. Review medications for side effects contributing to fall risk. Reinforce activity limits and safety measures with patient and family. Implemented All Fall Risk Interventions:  [place] to call system. Call bell, personal items and telephone within reach. Instruct patient to call for assistance. Room bathroom lighting operational. Non-slip footwear when patient is off stretcher. Physically safe environment: no spills, clutter or unnecessary equipment. Stretcher in lowest position, wheels locked, appropriate side rails in place. Provide visual cue, wrist band, yellow gown, etc. Monitor gait and stability. Monitor for mental status changes and reorient to person, place, and time. Review medications for side effects contributing to fall risk. Reinforce activity limits and safety measures with patient and family. Implemented All Fall Risk Interventions:  [place] to call system. Call bell, personal items and telephone within reach. Instruct patient to call for assistance. Room bathroom lighting operational. Non-slip footwear when patient is off stretcher. Physically safe environment: no spills, clutter or unnecessary equipment. Stretcher in lowest position, wheels locked, appropriate side rails in place. Provide visual cue, wrist band, yellow gown, etc. Monitor gait and stability. Monitor for mental status changes and reorient to person, place, and time. Review medications for side effects contributing to fall risk. Reinforce activity limits and safety measures with patient and family.

## 2023-04-17 NOTE — ED PROVIDER NOTE - PHYSICAL EXAMINATION
Gen: Well appearing in NAD  Head: NC/AT  Neck: trachea midline, no midline back tenderness, no neck tenedrness  abd nontender  Resp:  No distress, lungs clear  cv: rrr  abd nontender  Ext: no deformities, from, no eccyhomiss, no bondy tenderness   Neuro:  A&O appears non focal  Skin:  Warm and dry as visualized  Psych:  Normal affect and mood

## 2023-04-17 NOTE — ED ADULT NURSE NOTE - OBJECTIVE STATEMENT
Comes to ED from home brought by daughter for having an unwitnessed fall.  Pt was found face down by her bed, pt not on blood thinners, pt denies CP, SOB.  Per daughter patient is at baseline mental state.

## 2023-04-17 NOTE — ED PROVIDER NOTE - CONTACT TIME
17-Apr-2023 10:10 Number Of Freeze-Thaw Cycles: 1 freeze-thaw cycle Render Note In Bullet Format When Appropriate: No Show Aperture Variable?: Yes Duration Of Freeze Thaw-Cycle (Seconds): 3 Post-Care Instructions: I reviewed with the patient in detail post-care instructions. Patient is to wear sunprotection, and avoid picking at any of the treated lesions. Pt may apply Vaseline to crusted or scabbing areas. Detail Level: Detailed Consent: The patient's consent was obtained including but not limited to risks of crusting, scabbing, blistering, scarring, darker or lighter pigmentary change, recurrence, incomplete removal and infection.

## 2023-04-17 NOTE — ED ADULT NURSE NOTE - NSICDXPASTMEDICALHX_GEN_ALL_CORE_FT
PAST MEDICAL HISTORY:  Dementia     Diabetes mellitus     Glaucoma     Hyperlipidemia     Hypertension     Right cataract surgery 11/29/22

## 2023-04-17 NOTE — ED ADULT NURSE NOTE - SUICIDE SCREENING QUESTION 1
Rifampin Counseling: I discussed with the patient the risks of rifampin including but not limited to liver damage, kidney damage, red-orange body fluids, nausea/vomiting and severe allergy. Patient unable to complete

## 2023-04-17 NOTE — ED PROVIDER NOTE - NSICDXPASTMEDICALHX_GEN_ALL_CORE_FT
PAST MEDICAL HISTORY:  Dementia     Diabetes mellitus     Glaucoma     Hyperlipidemia     Hypertension     Right cataract surgery 11/29/22    
01-Mar-2021 09:26

## 2023-04-17 NOTE — ED PROVIDER NOTE - CLINICAL SUMMARY MEDICAL DECISION MAKING FREE TEXT BOX
78-year-old female with a history of dm, htn, ,dementia  presents with fall.  Patient was upstairs in granddaughter's room was trying to get in bed and slipped off the bed and fell and hit her head.  Did not have any loss of consciousness no nausea or vomiting, complains of pain in the back of her head as well as her right elbow.  Patient was helped up and was able to ambulate after fall. no ac use, initially did not ocmplain of neck pain b ut now says she has neck pain.   no hip pain  will get Ct head, ro ich, ct neck and elbow xr ro fracture

## 2023-10-30 ENCOUNTER — APPOINTMENT (OUTPATIENT)
Dept: NEUROLOGY | Facility: CLINIC | Age: 78
End: 2023-10-30

## 2023-10-30 NOTE — ASU PREOP CHECKLIST - BP NONINVASIVE DIASTOLIC (MM HG)
Called patient to schedule Esophageal Motility Study. No answer, left voicemail with call back number, [phone].     
77

## 2023-11-13 ENCOUNTER — APPOINTMENT (OUTPATIENT)
Dept: CARDIOLOGY | Facility: CLINIC | Age: 78
End: 2023-11-13

## 2023-12-21 ENCOUNTER — NON-APPOINTMENT (OUTPATIENT)
Age: 78
End: 2023-12-21

## 2023-12-21 ENCOUNTER — APPOINTMENT (OUTPATIENT)
Dept: CARDIOLOGY | Facility: CLINIC | Age: 78
End: 2023-12-21
Payer: MEDICARE

## 2023-12-21 VITALS
BODY MASS INDEX: 30.73 KG/M2 | WEIGHT: 180 LBS | DIASTOLIC BLOOD PRESSURE: 70 MMHG | HEIGHT: 64 IN | HEART RATE: 67 BPM | TEMPERATURE: 98.3 F | SYSTOLIC BLOOD PRESSURE: 117 MMHG | OXYGEN SATURATION: 98 %

## 2023-12-21 DIAGNOSIS — Z78.9 OTHER SPECIFIED HEALTH STATUS: ICD-10-CM

## 2023-12-21 DIAGNOSIS — I35.0 NONRHEUMATIC AORTIC (VALVE) STENOSIS: ICD-10-CM

## 2023-12-21 DIAGNOSIS — F03.90 UNSPECIFIED DEMENTIA W/OUT BEHAVIORAL DISTURBANCE: ICD-10-CM

## 2023-12-21 DIAGNOSIS — E11.8 TYPE 2 DIABETES MELLITUS WITH UNSPECIFIED COMPLICATIONS: ICD-10-CM

## 2023-12-21 DIAGNOSIS — R55 SYNCOPE AND COLLAPSE: ICD-10-CM

## 2023-12-21 DIAGNOSIS — I10 ESSENTIAL (PRIMARY) HYPERTENSION: ICD-10-CM

## 2023-12-21 DIAGNOSIS — Z82.41 FAMILY HISTORY OF SUDDEN CARDIAC DEATH: ICD-10-CM

## 2023-12-21 DIAGNOSIS — E78.5 HYPERLIPIDEMIA, UNSPECIFIED: ICD-10-CM

## 2023-12-21 PROBLEM — Z00.00 ENCOUNTER FOR PREVENTIVE HEALTH EXAMINATION: Status: ACTIVE | Noted: 2023-12-21

## 2023-12-21 PROCEDURE — 93000 ELECTROCARDIOGRAM COMPLETE: CPT | Mod: 59

## 2023-12-21 PROCEDURE — 99204 OFFICE O/P NEW MOD 45 MIN: CPT

## 2023-12-21 PROCEDURE — 93242 EXT ECG>48HR<7D RECORDING: CPT

## 2023-12-21 RX ORDER — ROSUVASTATIN CALCIUM 5 MG/1
5 TABLET, FILM COATED ORAL
Refills: 0 | Status: ACTIVE | COMMUNITY

## 2023-12-21 RX ORDER — FUROSEMIDE 40 MG/1
40 TABLET ORAL
Refills: 0 | Status: ACTIVE | COMMUNITY

## 2023-12-21 RX ORDER — DULOXETINE HYDROCHLORIDE 30 MG/1
30 CAPSULE, DELAYED RELEASE PELLETS ORAL
Refills: 0 | Status: ACTIVE | COMMUNITY

## 2023-12-21 RX ORDER — LISINOPRIL 20 MG/1
20 TABLET ORAL
Refills: 0 | Status: ACTIVE | COMMUNITY

## 2023-12-21 NOTE — REVIEW OF SYSTEMS
[Lower Ext Edema] : lower extremity edema [Syncope] : syncope [Joint Pain] : joint pain [Memory Lapses Or Loss] : memory lapses or loss [Negative] : Heme/Lymph [SOB] : no shortness of breath [Dyspnea on exertion] : not dyspnea during exertion [Chest Discomfort] : no chest discomfort [Leg Claudication] : no intermittent leg claudication [Palpitations] : no palpitations [Orthopnea] : no orthopnea [PND] : no PND

## 2023-12-21 NOTE — DISCUSSION/SUMMARY
[FreeTextEntry1] : Pt with episodes of less responsiveness; initial episode may have been vasovagal but pt unable to recall series of events. Further episodes do not have any clear inciting factor, raising the concern for a cardiac arrhtyhmia.  AS is likey moderate based upon exam and recent TTE  will check a carotid duplex and cardiac event monitor [EKG obtained to assist in diagnosis and management of assessed problem(s)] : EKG obtained to assist in diagnosis and management of assessed problem(s)

## 2023-12-21 NOTE — HISTORY OF PRESENT ILLNESS
[FreeTextEntry1] : PCP: Dr. Khris Rebolledo  78F HTN, HLD, hx of DM, CKD, dementia who presents for cardiac evaluation  episode of LOC/unresponsiveness in September 2023 after using the bathroom, noted to have hypotension by EMS. Also found to have elevated SCr and UTI and was admitted for several days was told the AS was more severe but no further details available Pt unable to recall exact details re: the episode, however dtr reports more recently with episodes where she is less responsive but not as long as the episode prior to hospitalization. no LOC FS are usually well controlled

## 2023-12-21 NOTE — CARDIOLOGY SUMMARY
[de-identified] : 12/21/23: SB 59, LAE, old anterior infarct [de-identified] : TTE 11/2022: mild AS

## 2023-12-21 NOTE — PHYSICAL EXAM
[Well Developed] : well developed [Well Nourished] : well nourished [No Acute Distress] : no acute distress [Normal Conjunctiva] : normal conjunctiva [Normal Venous Pressure] : normal venous pressure [No Carotid Bruit] : no carotid bruit [No Murmur] : no murmur [Murmur] : murmur [Clear Lung Fields] : clear lung fields [Good Air Entry] : good air entry [Soft] : abdomen soft [Non Tender] : non-tender [Abnormal Gait] : abnormal gait [Edema ___] : edema [unfilled] [Venous stasis] : venous stasis [Moves all extremities] : moves all extremities [No Focal Deficits] : no focal deficits [Normal Speech] : normal speech [Alert and Oriented] : alert and oriented [de-identified] : 2/6 RUSB, 1/4 RUSB, 2/6 LLSB [de-identified] : S1, diminished s2

## 2024-01-09 ENCOUNTER — APPOINTMENT (OUTPATIENT)
Dept: CARDIOLOGY | Facility: CLINIC | Age: 79
End: 2024-01-09

## 2024-01-10 RX ORDER — TRAMADOL HYDROCHLORIDE 50 MG/1
1 TABLET ORAL
Qty: 0 | Refills: 0 | DISCHARGE

## 2024-01-11 ENCOUNTER — APPOINTMENT (OUTPATIENT)
Dept: CARDIOLOGY | Facility: CLINIC | Age: 79
End: 2024-01-11
Payer: MEDICARE

## 2024-01-11 ENCOUNTER — APPOINTMENT (OUTPATIENT)
Dept: CARDIOLOGY | Facility: CLINIC | Age: 79
End: 2024-01-11

## 2024-01-11 PROCEDURE — 93880 EXTRACRANIAL BILAT STUDY: CPT

## 2024-01-21 ENCOUNTER — EMERGENCY (EMERGENCY)
Facility: HOSPITAL | Age: 79
LOS: 1 days | Discharge: HOME | End: 2024-01-21
Attending: STUDENT IN AN ORGANIZED HEALTH CARE EDUCATION/TRAINING PROGRAM | Admitting: EMERGENCY MEDICINE
Payer: MEDICARE

## 2024-01-21 VITALS
RESPIRATION RATE: 18 BRPM | SYSTOLIC BLOOD PRESSURE: 136 MMHG | HEART RATE: 54 BPM | DIASTOLIC BLOOD PRESSURE: 79 MMHG | OXYGEN SATURATION: 98 % | TEMPERATURE: 98 F | WEIGHT: 169.98 LBS

## 2024-01-21 DIAGNOSIS — Z90.49 ACQUIRED ABSENCE OF OTHER SPECIFIED PARTS OF DIGESTIVE TRACT: Chronic | ICD-10-CM

## 2024-01-21 DIAGNOSIS — Z90.710 ACQUIRED ABSENCE OF BOTH CERVIX AND UTERUS: Chronic | ICD-10-CM

## 2024-01-21 LAB
ALBUMIN SERPL ELPH-MCNC: 3.3 G/DL — SIGNIFICANT CHANGE UP (ref 3.3–5)
ALP SERPL-CCNC: 87 U/L — SIGNIFICANT CHANGE UP (ref 40–120)
ALT FLD-CCNC: 16 U/L — SIGNIFICANT CHANGE UP (ref 12–78)
ANION GAP SERPL CALC-SCNC: 4 MMOL/L — LOW (ref 5–17)
AST SERPL-CCNC: 14 U/L — LOW (ref 15–37)
BILIRUB SERPL-MCNC: 0.4 MG/DL — SIGNIFICANT CHANGE UP (ref 0.2–1.2)
BUN SERPL-MCNC: 32 MG/DL — HIGH (ref 7–23)
CALCIUM SERPL-MCNC: 8.9 MG/DL — SIGNIFICANT CHANGE UP (ref 8.5–10.1)
CHLORIDE SERPL-SCNC: 111 MMOL/L — HIGH (ref 96–108)
CK MB BLD-MCNC: 4.1 % — HIGH (ref 0–3.5)
CK MB CFR SERPL CALC: 1.8 NG/ML — SIGNIFICANT CHANGE UP (ref 0–3.6)
CK SERPL-CCNC: 44 U/L — SIGNIFICANT CHANGE UP (ref 26–192)
CO2 SERPL-SCNC: 22 MMOL/L — SIGNIFICANT CHANGE UP (ref 22–31)
CREAT SERPL-MCNC: 1.6 MG/DL — HIGH (ref 0.5–1.3)
EGFR: 33 ML/MIN/1.73M2 — LOW
GLUCOSE SERPL-MCNC: 133 MG/DL — HIGH (ref 70–99)
NT-PROBNP SERPL-SCNC: 861 PG/ML — HIGH (ref 0–450)
POTASSIUM SERPL-MCNC: 4.7 MMOL/L — SIGNIFICANT CHANGE UP (ref 3.5–5.3)
POTASSIUM SERPL-SCNC: 4.7 MMOL/L — SIGNIFICANT CHANGE UP (ref 3.5–5.3)
PROT SERPL-MCNC: 7 G/DL — SIGNIFICANT CHANGE UP (ref 6–8.3)
SODIUM SERPL-SCNC: 137 MMOL/L — SIGNIFICANT CHANGE UP (ref 135–145)
TROPONIN I, HIGH SENSITIVITY RESULT: 9.8 NG/L — SIGNIFICANT CHANGE UP

## 2024-01-21 PROCEDURE — 99285 EMERGENCY DEPT VISIT HI MDM: CPT

## 2024-01-21 PROCEDURE — 93010 ELECTROCARDIOGRAM REPORT: CPT

## 2024-01-21 PROCEDURE — 71045 X-RAY EXAM CHEST 1 VIEW: CPT | Mod: 26

## 2024-01-21 RX ORDER — ASPIRIN/CALCIUM CARB/MAGNESIUM 324 MG
324 TABLET ORAL ONCE
Refills: 0 | Status: COMPLETED | OUTPATIENT
Start: 2024-01-21 | End: 2024-01-21

## 2024-01-21 RX ADMIN — Medication 324 MILLIGRAM(S): at 23:21

## 2024-01-21 NOTE — ED PROVIDER NOTE - NSFOLLOWUPINSTRUCTIONS_ED_ALL_ED_FT
-- You should update your primary care physician on your Emergency Department visit and follow up with them.  If you do not have a physician or have difficulty following up, please call: [phone] to obtain a [facility] doctor or specialist who can provide follow up.    -- Return to the ER for worsening or persistent symptoms, and/or ANY NEW OR CONCERNING SYMPTOMS.

## 2024-01-21 NOTE — ED PROVIDER NOTE - PATIENT PORTAL LINK FT
You can access the FollowMyHealth Patient Portal offered by [facility] by registering at the following website: [email or web address]. By joining [facility]’s FollowMyHealth portal, you will also be able to view your health information using other applications (apps) compatible with our system.

## 2024-01-21 NOTE — ED PROVIDER NOTE - OBJECTIVE STATEMENT
79 y/o F PMH of HTN, DM2, HLD, Dementia, CKD, moderate AS presenting with right sided chest/arm pain     Patient accompanied by daughter who provides history. States that pain started around 2PM this afternoon, non-exertional quality. States has not taken furosemide in two days (40 mg) due to inability to  medication. Follows with Dr. Wheeler and had recent Event Monitor, Carotid Duplex and TTE showing moderate AS after a syncopal episode.

## 2024-01-21 NOTE — ED PROVIDER NOTE - PHYSICAL EXAMINATION
GENERAL: no acute distress; well-developed  HEAD:  Atraumatic, Normocephalic  EYES: EOMI, PERRLA, conjunctiva and sclera clear  ENT: MMM; oropharynx clear  NECK: Supple, No JVD  CHEST/LUNG: Clear to auscultation bilaterally; No wheeze  HEART: Regular rate and rhythm; No murmurs, rubs, or gallops  ABDOMEN: Soft, Nontender, Nondistended; Bowel sounds present  EXTREMITIES:  2+ Peripheral Pulses, LE edema L >R  PSYCH: AAOx3  NEUROLOGY: no focal motor or sensory deficits. 5/5 muscle strength in all extremities.   SKIN: No rashes or lesions

## 2024-01-21 NOTE — ED PROVIDER NOTE - CLINICAL SUMMARY MEDICAL DECISION MAKING FREE TEXT BOX
77 y/o F PMH of HTN, DM2, HLD, Dementia, CKD, moderate AS presenting with right sided chest/arm pain   Pain somewhat reproducible.   EKG with no CRYSTAL, though notable concave morphology in inferior leads  Will check serial EKG, troponin, BNP  CXR  LE Duplex to r/o DVT  Cardiology evaluation   Aspirin load  Reassess

## 2024-01-21 NOTE — ED PROVIDER NOTE - ST/T WAVE
concave morphology III/avF with NO elevation concave morphology III/avF with NO j point elevation, TWI I/aVL

## 2024-01-21 NOTE — ED PROVIDER NOTE - NSICDXPASTMEDICALHX_GEN_ALL_CORE_FT
PAST MEDICAL HISTORY:  Chronic kidney disease     Dementia     Diabetes mellitus     Glaucoma     Glaucoma     Hyperlipidemia     Hypertension     Impaired memory     Right cataract surgery 11/29/22    Type 2 diabetes mellitus without complication, without long-term current use of insulin     Urinary incontinence     Uterine cancer

## 2024-01-22 VITALS
HEART RATE: 54 BPM | DIASTOLIC BLOOD PRESSURE: 60 MMHG | OXYGEN SATURATION: 97 % | TEMPERATURE: 98 F | SYSTOLIC BLOOD PRESSURE: 129 MMHG | RESPIRATION RATE: 18 BRPM

## 2024-01-22 PROBLEM — H40.9 UNSPECIFIED GLAUCOMA: Chronic | Status: ACTIVE | Noted: 2022-12-06

## 2024-01-22 PROBLEM — E11.9 TYPE 2 DIABETES MELLITUS WITHOUT COMPLICATIONS: Chronic | Status: ACTIVE | Noted: 2022-12-04

## 2024-01-22 PROBLEM — I10 ESSENTIAL (PRIMARY) HYPERTENSION: Chronic | Status: ACTIVE | Noted: 2022-12-04

## 2024-01-22 PROBLEM — E78.5 HYPERLIPIDEMIA, UNSPECIFIED: Chronic | Status: ACTIVE | Noted: 2022-12-04

## 2024-01-22 PROBLEM — H26.9 UNSPECIFIED CATARACT: Chronic | Status: ACTIVE | Noted: 2022-12-06

## 2024-01-22 PROBLEM — F03.90 UNSPECIFIED DEMENTIA, UNSPECIFIED SEVERITY, WITHOUT BEHAVIORAL DISTURBANCE, PSYCHOTIC DISTURBANCE, MOOD DISTURBANCE, AND ANXIETY: Chronic | Status: ACTIVE | Noted: 2023-04-17

## 2024-01-22 LAB
APTT BLD: 16.6 SEC — LOW (ref 24.5–35.6)
BASOPHILS # BLD AUTO: 0.03 K/UL — SIGNIFICANT CHANGE UP (ref 0–0.2)
BASOPHILS NFR BLD AUTO: 0.5 % — SIGNIFICANT CHANGE UP (ref 0–2)
EOSINOPHIL # BLD AUTO: 0.09 K/UL — SIGNIFICANT CHANGE UP (ref 0–0.5)
EOSINOPHIL NFR BLD AUTO: 1.6 % — SIGNIFICANT CHANGE UP (ref 0–6)
FLUBV RNA SPEC QL NAA+PROBE: DETECTED
HCT VFR BLD CALC: 37.4 % — SIGNIFICANT CHANGE UP (ref 34.5–45)
HGB BLD-MCNC: 12.7 G/DL — SIGNIFICANT CHANGE UP (ref 11.5–15.5)
IMM GRANULOCYTES NFR BLD AUTO: 0.5 % — SIGNIFICANT CHANGE UP (ref 0–0.9)
INR BLD: 0.88 RATIO — SIGNIFICANT CHANGE UP (ref 0.85–1.18)
LYMPHOCYTES # BLD AUTO: 0.9 K/UL — LOW (ref 1–3.3)
LYMPHOCYTES # BLD AUTO: 16 % — SIGNIFICANT CHANGE UP (ref 13–44)
MCHC RBC-ENTMCNC: 31.9 PG — SIGNIFICANT CHANGE UP (ref 27–34)
MCHC RBC-ENTMCNC: 34 GM/DL — SIGNIFICANT CHANGE UP (ref 32–36)
MCV RBC AUTO: 94 FL — SIGNIFICANT CHANGE UP (ref 80–100)
MONOCYTES # BLD AUTO: 0.65 K/UL — SIGNIFICANT CHANGE UP (ref 0–0.9)
MONOCYTES NFR BLD AUTO: 11.6 % — SIGNIFICANT CHANGE UP (ref 2–14)
NEUTROPHILS # BLD AUTO: 3.91 K/UL — SIGNIFICANT CHANGE UP (ref 1.8–7.4)
NEUTROPHILS NFR BLD AUTO: 69.8 % — SIGNIFICANT CHANGE UP (ref 43–77)
NRBC # BLD: 0 /100 WBCS — SIGNIFICANT CHANGE UP (ref 0–0)
PLATELET # BLD AUTO: 170 K/UL — SIGNIFICANT CHANGE UP (ref 150–400)
PROTHROM AB SERPL-ACNC: 10.3 SEC — SIGNIFICANT CHANGE UP (ref 9.5–13)
RAPID RVP RESULT: DETECTED
RBC # BLD: 3.98 M/UL — SIGNIFICANT CHANGE UP (ref 3.8–5.2)
RBC # FLD: 13.2 % — SIGNIFICANT CHANGE UP (ref 10.3–14.5)
SARS-COV-2 RNA SPEC QL NAA+PROBE: SIGNIFICANT CHANGE UP
TROPONIN I, HIGH SENSITIVITY RESULT: 10.4 NG/L — SIGNIFICANT CHANGE UP
WBC # BLD: 5.61 K/UL — SIGNIFICANT CHANGE UP (ref 3.8–10.5)
WBC # FLD AUTO: 5.61 K/UL — SIGNIFICANT CHANGE UP (ref 3.8–10.5)

## 2024-01-22 PROCEDURE — 36415 COLL VENOUS BLD VENIPUNCTURE: CPT

## 2024-01-22 PROCEDURE — 93005 ELECTROCARDIOGRAM TRACING: CPT

## 2024-01-22 PROCEDURE — 93970 EXTREMITY STUDY: CPT

## 2024-01-22 PROCEDURE — 99284 EMERGENCY DEPT VISIT MOD MDM: CPT

## 2024-01-22 PROCEDURE — 99285 EMERGENCY DEPT VISIT HI MDM: CPT | Mod: 25

## 2024-01-22 PROCEDURE — 93010 ELECTROCARDIOGRAM REPORT: CPT

## 2024-01-22 PROCEDURE — 83880 ASSAY OF NATRIURETIC PEPTIDE: CPT

## 2024-01-22 PROCEDURE — 93970 EXTREMITY STUDY: CPT | Mod: 26

## 2024-01-22 PROCEDURE — 85610 PROTHROMBIN TIME: CPT

## 2024-01-22 PROCEDURE — 85730 THROMBOPLASTIN TIME PARTIAL: CPT

## 2024-01-22 PROCEDURE — 84484 ASSAY OF TROPONIN QUANT: CPT

## 2024-01-22 PROCEDURE — 80053 COMPREHEN METABOLIC PANEL: CPT

## 2024-01-22 PROCEDURE — 0225U NFCT DS DNA&RNA 21 SARSCOV2: CPT

## 2024-01-22 PROCEDURE — 85025 COMPLETE CBC W/AUTO DIFF WBC: CPT

## 2024-01-22 PROCEDURE — 82553 CREATINE MB FRACTION: CPT

## 2024-01-22 PROCEDURE — 71045 X-RAY EXAM CHEST 1 VIEW: CPT

## 2024-01-22 PROCEDURE — 82550 ASSAY OF CK (CPK): CPT

## 2024-01-22 RX ORDER — ROSUVASTATIN CALCIUM 5 MG/1
1 TABLET ORAL
Qty: 0 | Refills: 0 | DISCHARGE

## 2024-01-22 RX ORDER — LISINOPRIL 2.5 MG/1
1 TABLET ORAL
Qty: 0 | Refills: 0 | DISCHARGE

## 2024-01-22 RX ORDER — METOPROLOL TARTRATE 50 MG
12.5 TABLET ORAL
Qty: 0 | Refills: 0 | DISCHARGE

## 2024-01-22 RX ORDER — ASPIRIN/CALCIUM CARB/MAGNESIUM 324 MG
1 TABLET ORAL
Qty: 0 | Refills: 0 | DISCHARGE

## 2024-01-22 RX ORDER — MIRABEGRON 50 MG/1
1 TABLET, EXTENDED RELEASE ORAL
Qty: 0 | Refills: 0 | DISCHARGE

## 2024-01-22 RX ORDER — SITAGLIPTIN AND METFORMIN HYDROCHLORIDE 500; 50 MG/1; MG/1
1 TABLET, FILM COATED ORAL
Qty: 0 | Refills: 0 | DISCHARGE

## 2024-01-22 RX ORDER — DULOXETINE HYDROCHLORIDE 30 MG/1
1 CAPSULE, DELAYED RELEASE ORAL
Qty: 0 | Refills: 0 | DISCHARGE

## 2024-01-22 RX ORDER — TRAVOPROST 0.04 MG/ML
1 SOLUTION/ DROPS OPHTHALMIC
Qty: 0 | Refills: 0 | DISCHARGE

## 2024-01-22 RX ORDER — FUROSEMIDE 40 MG
1 TABLET ORAL
Qty: 0 | Refills: 0 | DISCHARGE

## 2024-01-22 RX ORDER — LATANOPROST 0.05 MG/ML
1 SOLUTION/ DROPS OPHTHALMIC; TOPICAL
Refills: 0 | DISCHARGE

## 2024-01-22 RX ORDER — BRINZOLAMIDE/BRIMONIDINE TARTRATE 10; 2 MG/ML; MG/ML
1 SUSPENSION/ DROPS OPHTHALMIC
Qty: 0 | Refills: 0 | DISCHARGE

## 2024-01-22 RX ORDER — ACETAMINOPHEN 500 MG
650 TABLET ORAL ONCE
Refills: 0 | Status: COMPLETED | OUTPATIENT
Start: 2024-01-22 | End: 2024-01-22

## 2024-01-22 RX ADMIN — Medication 650 MILLIGRAM(S): at 08:45

## 2024-01-22 NOTE — ED ADULT NURSE REASSESSMENT NOTE - NS ED NURSE REASSESS COMMENT FT1
Pt denies pain or discomfort. Low grade fever 100.4 rectal temp. Tylenol given as per MD orders. No further needs at this time.

## 2024-01-22 NOTE — ED ADULT NURSE NOTE - NSFALLRISKINTERV_ED_ALL_ED

## 2024-01-22 NOTE — ED ADULT NURSE REASSESSMENT NOTE - NS ED NURSE REASSESS COMMENT FT1
patient is transported to ultrasound. vital signs within normal limits for patient. patient denies chest pain, or shortness of breath.  medications tolerated well.  safety precautions maintained.  will continue to assess and monitor for safety.

## 2024-01-22 NOTE — CONSULT NOTE ADULT - ASSESSMENT
77yo F with PMHx HTN, HLD, DM2, moderate AS, CKD and dementia presenting with c/o right sided arm and chest pain. Cardiology consulted for r/o ACS.    #Chest Pain  - Patient is not complaining of any cardiac symptoms at this time.  - No clear evidence of acute ischemia, trops negative x 2.  - EKG poor quality, with TWI in lateral leads that appear unchanged from prior.  - Will repeat EKG - if no acute changes, from cardiac standpoint patient can be discharged home with outpatient follow up.    - No meaningful evidence of volume overload.  - Previous TTE 12/2022 eith EF 60% and mild AS  - BP well controlled, monitor routine hemodynamics.  - Monitor and replete lytes, keep K>4, Mg>2.    - Other cardiovascular workup will depend on clinical course.  - All other workup per primary team.  - Will continue to follow. 79yo F with PMHx HTN, HLD, DM2, moderate AS, CKD and dementia presenting with c/o right sided arm and chest pain. Cardiology consulted for r/o ACS.    #Chest Pain  - Patient is not complaining of any cardiac symptoms at this time.  - No clear evidence of acute ischemia, trops negative x 2.  - No meaningful evidence of volume overload.  - Previous TTE 12/2022 eith EF 60% and mild AS  - BP well controlled, monitor routine hemodynamics.  - EKG poor quality, with TWI in lateral leads that appear unchanged from prior EKGs in 2019 and 2022  - Repeat EKG with no acute changes, from cardiac standpoint patient can be discharged home with outpatient follow up.

## 2024-01-22 NOTE — CONSULT NOTE ADULT - SUBJECTIVE AND OBJECTIVE BOX
[facility]     Ramón Rouse Patel, Savella, [place]       [phone] (office)    Reason for Consult: chest pain    HPI: 68yo F with PMHx HTN, HLD, DM2, moderate AS, CKD and dementia presenting with c/o right sided arm and chest pain. Pt is pleasantly demented and a poor historian, history obtained from daughter. Pt devel      PAST MEDICAL & SURGICAL HISTORY:  Type 2 diabetes mellitus without complication, without long-term current use of insulin      Glaucoma      Uterine cancer      Urinary incontinence      Chronic kidney disease      Impaired memory      Diabetes mellitus      Hyperlipidemia      Hypertension      Right cataract  surgery 11/29/22      Glaucoma      Dementia      H/O total hysterectomy      S/P cholecystectomy  11/29/2022          SOCIAL HISTORY: No active tobacco, alcohol or illicit drug use    FAMILY HISTORY:  No pertinent family history in first degree relatives    No pertinent family history in first degree relatives        Home Medications:  acetaminophen 325 mg oral tablet: 2 tab(s) orally every 6 hours, As needed, Moderate Pain (4 - 6) (22 Jan 2024 07:54)  aspirin 81 mg oral tablet: 1 tab(s) orally once a day (22 Jan 2024 07:54)  Crestor 5 mg oral tablet: 1 tab(s) orally once a day (at bedtime) (22 Jan 2024 07:54)  cyanocobalamin 1000 mcg oral tablet: 1 tab(s) orally once a day (22 Jan 2024 07:54)  DULoxetine 30 mg oral delayed release capsule: 1 cap(s) orally once a day (22 Jan 2024 07:54)  Janumet  mg-1000 mg oral tablet, extended release: 1 tab(s) orally once a day (in the evening) (22 Jan 2024 07:54)  Lasix 40 mg oral tablet: 1 tab(s) orally once a day (22 Jan 2024 07:54)  latanoprost 0.005% ophthalmic solution: 1 drop(s) in each eye once a day (at bedtime) (22 Jan 2024 07:59)  lisinopril 20 mg oral tablet: 1 tab(s) orally once a day (22 Jan 2024 07:54)  metoprolol succinate 25 mg oral tablet, extended release: 12.5 milligram(s) orally once a day (22 Jan 2024 07:54)  Myrbetriq 25 mg oral tablet, extended release: 1 tab(s) orally once a day (22 Jan 2024 07:54)  rosuvastatin 5 mg oral tablet: 1 tab(s) orally once a day (at bedtime) (22 Jan 2024 07:54)  travoprost 0.004% ophthalmic solution: 1 drop(s) to each affected eye once a day (in the evening) (22 Jan 2024 07:54)      MEDICATIONS  (STANDING):    MEDICATIONS  (PRN):      Allergies    penicillin (Unknown)  penicillin (Hives)    Intolerances        REVIEW OF SYSTEMS: Negative except as per HPI.    VITAL SIGNS:   Vital Signs Last 24 Hrs  T(C): 36.7 (22 Jan 2024 05:09), Max: 36.7 (21 Jan 2024 18:51)  T(F): 98.1 (22 Jan 2024 05:09), Max: 98.1 (21 Jan 2024 18:51)  HR: 54 (22 Jan 2024 05:09) (54 - 54)  BP: 152/67 (22 Jan 2024 05:09) (121/67 - 152/67)  BP(mean): --  RR: 18 (22 Jan 2024 05:09) (18 - 18)  SpO2: 100% (22 Jan 2024 05:09) (98% - 100%)    Parameters below as of 22 Jan 2024 05:09  Patient On (Oxygen Delivery Method): room air        I&O's Summary      PHYSICAL EXAM:  Constitutional: NAD, well-developed  HEENT NC/AT, moist mucous membranes  Pulmonary: Non-labored, breath sounds are clear bilaterally, no wheezing, rales or rhonchi  Cardiovascular: +S1, S2, RRR, no murmur  Gastrointestinal: Soft, nontender, nondistended, normoactive bowel sounds  Extremities: No peripheral edema   Neurological: Alert, strength and sensitivity are grossly intact  Skin: No obvious lesions/rashes  Psych: Mood & affect appropriate    LABS: All Labs Reviewed:                        12.7   5.61  )-----------( 170      ( 21 Jan 2024 23:15 )             37.4     21 Jan 2024 23:15    137    |  111    |  32     ----------------------------<  133    4.7     |  22     |  1.60     Ca    8.9        21 Jan 2024 23:15    TPro  7.0    /  Alb  3.3    /  TBili  0.4    /  DBili  x      /  AST  14     /  ALT  16     /  AlkPhos  87     21 Jan 2024 23:15    PT/INR - ( 21 Jan 2024 23:15 )   PT: 10.3 sec;   INR: 0.88 ratio         PTT - ( 21 Jan 2024 23:15 )  PTT:16.6 sec  CARDIAC MARKERS ( 21 Jan 2024 23:15 )  x     / x     / 44 U/L / x     / 1.8 ng/mL      Blood Culture:         EKG:    RADIOLOGY:    CXR:   [facility]     Ramón Rouse Patel, Savella, [place]       [phone] (office)    Reason for Consult: chest pain    HPI: 77yo F with PMHx HTN, HLD, DM2, moderate AS, CKD and dementia presenting with c/o right sided arm and chest pain. Pt is pleasantly demented and a poor historian, history obtained from daughter. Pt developed right arm pain around 2pm yesterday, non-exertional, and was having difficulty walking - daughter in unsure if 2/2 unsteady gait or dizziness. When she got to the ED she developed right sided chest pain as well. No recent falls. Has trace b/l LE edema, has been out of her Lasix for the past 2 days.  Follows with cardio Dr. Wheeler. Pt had a syncopal episode in 9/2023, recently had a Holter monitor and carotid Duplex done. Carotid Duplex with < 50% stenosis of b/l prox ICA. Last TTE 12/2022 with EF 60% and mild AS. Daughter denies hx of CVA, MI, afib, CHF.      PAST MEDICAL & SURGICAL HISTORY:  Type 2 diabetes mellitus without complication, without long-term current use of insulin      Glaucoma      Uterine cancer      Urinary incontinence      Chronic kidney disease      Impaired memory      Diabetes mellitus      Hyperlipidemia      Hypertension      Right cataract  surgery 11/29/22      Glaucoma      Dementia      H/O total hysterectomy      S/P cholecystectomy  11/29/2022          SOCIAL HISTORY: No active tobacco, alcohol or illicit drug use    FAMILY HISTORY:  No pertinent family history in first degree relatives    No pertinent family history in first degree relatives        Home Medications:  acetaminophen 325 mg oral tablet: 2 tab(s) orally every 6 hours, As needed, Moderate Pain (4 - 6) (22 Jan 2024 07:54)  aspirin 81 mg oral tablet: 1 tab(s) orally once a day (22 Jan 2024 07:54)  Crestor 5 mg oral tablet: 1 tab(s) orally once a day (at bedtime) (22 Jan 2024 07:54)  cyanocobalamin 1000 mcg oral tablet: 1 tab(s) orally once a day (22 Jan 2024 07:54)  DULoxetine 30 mg oral delayed release capsule: 1 cap(s) orally once a day (22 Jan 2024 07:54)  Janumet  mg-1000 mg oral tablet, extended release: 1 tab(s) orally once a day (in the evening) (22 Jan 2024 07:54)  Lasix 40 mg oral tablet: 1 tab(s) orally once a day (22 Jan 2024 07:54)  latanoprost 0.005% ophthalmic solution: 1 drop(s) in each eye once a day (at bedtime) (22 Jan 2024 07:59)  lisinopril 20 mg oral tablet: 1 tab(s) orally once a day (22 Jan 2024 07:54)  metoprolol succinate 25 mg oral tablet, extended release: 12.5 milligram(s) orally once a day (22 Jan 2024 07:54)  Myrbetriq 25 mg oral tablet, extended release: 1 tab(s) orally once a day (22 Jan 2024 07:54)  rosuvastatin 5 mg oral tablet: 1 tab(s) orally once a day (at bedtime) (22 Jan 2024 07:54)  travoprost 0.004% ophthalmic solution: 1 drop(s) to each affected eye once a day (in the evening) (22 Jan 2024 07:54)      MEDICATIONS  (STANDING):    MEDICATIONS  (PRN):      Allergies    penicillin (Unknown)  penicillin (Hives)    Intolerances        REVIEW OF SYSTEMS: Negative except as per HPI.    VITAL SIGNS:   Vital Signs Last 24 Hrs  T(C): 36.7 (22 Jan 2024 05:09), Max: 36.7 (21 Jan 2024 18:51)  T(F): 98.1 (22 Jan 2024 05:09), Max: 98.1 (21 Jan 2024 18:51)  HR: 54 (22 Jan 2024 05:09) (54 - 54)  BP: 152/67 (22 Jan 2024 05:09) (121/67 - 152/67)  BP(mean): --  RR: 18 (22 Jan 2024 05:09) (18 - 18)  SpO2: 100% (22 Jan 2024 05:09) (98% - 100%)    Parameters below as of 22 Jan 2024 05:09  Patient On (Oxygen Delivery Method): room air        I&O's Summary      PHYSICAL EXAM:  Constitutional: NAD, well-developed  HEENT NC/AT, moist mucous membranes  Pulmonary: Non-labored, breath sounds are clear bilaterally, no wheezing, rales or rhonchi  Cardiovascular: +S1, S2, RRR, +systolic murmur LUSB  Gastrointestinal: Soft, nontender, nondistended, normoactive bowel sounds  Extremities: trace b/l LE edema  Neurological: Alert, strength and sensitivity are grossly intact  Skin: No obvious lesions/rashes  Psych: Mood & affect appropriate    LABS: All Labs Reviewed:                        12.7   5.61  )-----------( 170      ( 21 Jan 2024 23:15 )             37.4     21 Jan 2024 23:15    137    |  111    |  32     ----------------------------<  133    4.7     |  22     |  1.60     Ca    8.9        21 Jan 2024 23:15    TPro  7.0    /  Alb  3.3    /  TBili  0.4    /  DBili  x      /  AST  14     /  ALT  16     /  AlkPhos  87     21 Jan 2024 23:15    PT/INR - ( 21 Jan 2024 23:15 )   PT: 10.3 sec;   INR: 0.88 ratio         PTT - ( 21 Jan 2024 23:15 )  PTT:16.6 sec  CARDIAC MARKERS ( 21 Jan 2024 23:15 )  x     / x     / 44 U/L / x     / 1.8 ng/mL

## 2024-01-22 NOTE — CONSULT NOTE ADULT - ATTENDING COMMENTS
79yo F with PMHx HTN, HLD, DM2, moderate AS, CKD and dementia presenting with c/o right sided arm and chest pain. Cardiology consulted for r/o ACS.    EKG with sinus bradycardia, TWI I, aVL  Multiple, previous EKGs reviewed and overall they appear unchanged  Trops negative x 2  She is asymptomatic on my exam this morning  Can follow up outpatient with Cardiology

## 2024-01-22 NOTE — ED ADULT NURSE REASSESSMENT NOTE - NS ED NURSE REASSESS COMMENT FT1
1155: Received pt in room T4 awake, confuse, no respiratory distress noted even and unlabored breathing. Skin warm and dry, + sensation, + capillary refill < 2sec. Abd soft BS+ all 4 quads nontender. Pt awaiting family for d/c home. No IV access noted on pt.

## 2024-01-22 NOTE — ED ADULT NURSE NOTE - OBJECTIVE STATEMENT
pt has chest pain that is radiating to left arm pain since 3 pm. pt EKG done. pt has pmh of Aortic Stenosis.  pt has complaint of bilateral knee sweeling and discoloration. pt is on furosemide/ rosuvastatin/metoprolol succ/lisinopril.

## 2024-01-23 RX ORDER — METOPROLOL SUCCINATE 25 MG/1
25 TABLET, EXTENDED RELEASE ORAL
Refills: 0 | Status: DISCONTINUED | COMMUNITY
End: 2024-01-23

## 2024-05-08 NOTE — ED ADULT NURSE NOTE - NSFALLRSKASSESSTYPE_ED_ALL_ED
[FreeTextEntry1] : 80 year-old female with vertebral artery stenosis, osteoporosis, HTN and HLD, presents for followup.    Patient was last seen on 12/28/23 - Patient has been stable.  /78 HR 63.  Repeat /70.  Patient reports home /70.  Patient reports that she forgets to take her medications at times.  I advised patient to continue current medications.  I advised patient to be compliant.  FU 6 months.   She is on ASA 81 mg and Simvastatin 20 mg for vertebral artery stenosis.  She is on Losartan 50 mg, Amlodipine 5 mg for HTN.   Initial (On Arrival)

## 2024-06-27 ENCOUNTER — APPOINTMENT (OUTPATIENT)
Dept: NEUROLOGY | Facility: CLINIC | Age: 79
End: 2024-06-27
Payer: MEDICARE

## 2024-06-27 PROCEDURE — 99204 OFFICE O/P NEW MOD 45 MIN: CPT

## 2024-10-25 ENCOUNTER — APPOINTMENT (OUTPATIENT)
Dept: NEUROLOGY | Facility: CLINIC | Age: 79
End: 2024-10-25

## 2025-04-11 ENCOUNTER — APPOINTMENT (OUTPATIENT)
Dept: CARDIOLOGY | Facility: CLINIC | Age: 80
End: 2025-04-11
